# Patient Record
Sex: MALE | Race: WHITE | NOT HISPANIC OR LATINO | Employment: OTHER | URBAN - METROPOLITAN AREA
[De-identification: names, ages, dates, MRNs, and addresses within clinical notes are randomized per-mention and may not be internally consistent; named-entity substitution may affect disease eponyms.]

---

## 2017-01-04 ENCOUNTER — GENERIC CONVERSION - ENCOUNTER (OUTPATIENT)
Dept: OTHER | Facility: OTHER | Age: 77
End: 2017-01-04

## 2017-01-17 ENCOUNTER — ALLSCRIPTS OFFICE VISIT (OUTPATIENT)
Dept: OTHER | Facility: OTHER | Age: 77
End: 2017-01-17

## 2017-01-17 ENCOUNTER — TRANSCRIBE ORDERS (OUTPATIENT)
Dept: ADMINISTRATIVE | Facility: HOSPITAL | Age: 77
End: 2017-01-17

## 2017-01-17 ENCOUNTER — HOSPITAL ENCOUNTER (OUTPATIENT)
Dept: RADIOLOGY | Facility: HOSPITAL | Age: 77
Discharge: HOME | End: 2017-01-17
Payer: COMMERCIAL

## 2017-01-17 DIAGNOSIS — M79.643 PAIN OF HAND: ICD-10-CM

## 2017-01-17 PROCEDURE — 73130 X-RAY EXAM OF HAND: CPT

## 2017-01-19 ENCOUNTER — ALLSCRIPTS OFFICE VISIT (OUTPATIENT)
Dept: OTHER | Facility: OTHER | Age: 77
End: 2017-01-19

## 2017-01-20 ENCOUNTER — GENERIC CONVERSION - ENCOUNTER (OUTPATIENT)
Dept: OTHER | Facility: OTHER | Age: 77
End: 2017-01-20

## 2017-02-23 ENCOUNTER — ALLSCRIPTS OFFICE VISIT (OUTPATIENT)
Dept: OTHER | Facility: OTHER | Age: 77
End: 2017-02-23

## 2017-03-21 ENCOUNTER — ALLSCRIPTS OFFICE VISIT (OUTPATIENT)
Dept: OTHER | Facility: OTHER | Age: 77
End: 2017-03-21

## 2017-03-22 ENCOUNTER — GENERIC CONVERSION - ENCOUNTER (OUTPATIENT)
Dept: OTHER | Facility: OTHER | Age: 77
End: 2017-03-22

## 2017-03-22 LAB
DEPRECATED RDW RBC AUTO: 13.9 % (ref 12.3–15.4)
HCT VFR BLD AUTO: 40.3 % (ref 37.5–51)
HGB BLD-MCNC: 13.5 G/DL (ref 12.6–17.7)
MCH RBC QN AUTO: 29.5 PG (ref 26.6–33)
MCHC RBC AUTO-ENTMCNC: 33.5 G/DL (ref 31.5–35.7)
MCV RBC AUTO: 88 FL (ref 79–97)
PLATELET # BLD AUTO: 201 X10E3/UL (ref 150–379)
RBC (HISTORICAL): 4.57 X10E6/UL (ref 4.14–5.8)
TSH SERPL DL<=0.05 MIU/L-ACNC: 1.49 UIU/ML (ref 0.45–4.5)
WBC # BLD AUTO: 10.8 X10E3/UL (ref 3.4–10.8)

## 2017-03-23 ENCOUNTER — GENERIC CONVERSION - ENCOUNTER (OUTPATIENT)
Dept: OTHER | Facility: OTHER | Age: 77
End: 2017-03-23

## 2017-03-23 ENCOUNTER — ALLSCRIPTS OFFICE VISIT (OUTPATIENT)
Dept: OTHER | Facility: OTHER | Age: 77
End: 2017-03-23

## 2017-03-23 LAB
A/G RATIO (HISTORICAL): 1.3 (ref 1.2–2.2)
ALBUMIN SERPL BCP-MCNC: 3.9 G/DL (ref 3.5–4.8)
ALP SERPL-CCNC: 70 IU/L (ref 39–117)
ALT SERPL W P-5'-P-CCNC: 20 IU/L (ref 0–44)
AST SERPL W P-5'-P-CCNC: 21 IU/L (ref 0–40)
BILIRUB SERPL-MCNC: 0.4 MG/DL (ref 0–1.2)
BUN SERPL-MCNC: 26 MG/DL (ref 8–27)
BUN/CREA RATIO (HISTORICAL): 18 (ref 10–22)
CALCIUM SERPL-MCNC: 9 MG/DL (ref 8.6–10.2)
CHLORIDE SERPL-SCNC: 98 MMOL/L (ref 96–106)
CHOLEST SERPL-MCNC: 196 MG/DL (ref 100–199)
CHOLEST/HDLC SERPL: 5.6 RATIO UNITS (ref 0–5)
CO2 SERPL-SCNC: 26 MMOL/L (ref 18–29)
CREAT SERPL-MCNC: 1.45 MG/DL (ref 0.76–1.27)
CREATININE, URINE (HISTORICAL): 145.8 MG/DL
EGFR AFRICAN AMERICAN (HISTORICAL): 54 ML/MIN/1.73
EGFR-AMERICAN CALC (HISTORICAL): 47 ML/MIN/1.73
GLUCOSE SERPL-MCNC: 122 MG/DL (ref 65–99)
HBA1C MFR BLD HPLC: 5.7 % (ref 4.8–5.6)
HDLC SERPL-MCNC: 35 MG/DL
LDLC SERPL CALC-MCNC: 96 MG/DL (ref 0–99)
MICROALBUM.,U,RANDOM (HISTORICAL): 29.8 UG/ML
MICROALBUMIN/CREATININE RATIO (HISTORICAL): 20.4 MG/G CREAT (ref 0–30)
POTASSIUM SERPL-SCNC: 4.1 MMOL/L (ref 3.5–5.2)
SODIUM SERPL-SCNC: 140 MMOL/L (ref 134–144)
TOT. GLOBULIN, SERUM (HISTORICAL): 3.1 G/DL (ref 1.5–4.5)
TOTAL PROTEIN (HISTORICAL): 7 G/DL (ref 6–8.5)
TRIGL SERPL-MCNC: 325 MG/DL (ref 0–149)
VLDLC SERPL CALC-MCNC: 65 MG/DL (ref 5–40)

## 2017-03-24 ENCOUNTER — GENERIC CONVERSION - ENCOUNTER (OUTPATIENT)
Dept: OTHER | Facility: OTHER | Age: 77
End: 2017-03-24

## 2017-04-05 ENCOUNTER — GENERIC CONVERSION - ENCOUNTER (OUTPATIENT)
Dept: OTHER | Facility: OTHER | Age: 77
End: 2017-04-05

## 2017-05-19 ENCOUNTER — ALLSCRIPTS OFFICE VISIT (OUTPATIENT)
Dept: OTHER | Facility: OTHER | Age: 77
End: 2017-05-19

## 2017-06-06 ENCOUNTER — ALLSCRIPTS OFFICE VISIT (OUTPATIENT)
Dept: OTHER | Facility: OTHER | Age: 77
End: 2017-06-06

## 2017-06-20 ENCOUNTER — ALLSCRIPTS OFFICE VISIT (OUTPATIENT)
Dept: OTHER | Facility: OTHER | Age: 77
End: 2017-06-20

## 2017-06-23 ENCOUNTER — ALLSCRIPTS OFFICE VISIT (OUTPATIENT)
Dept: OTHER | Facility: OTHER | Age: 77
End: 2017-06-23

## 2017-07-14 LAB
BUN SERPL-MCNC: 15 MG/DL (ref 8–27)
BUN/CREA RATIO (HISTORICAL): 12 (ref 10–24)
CALCIUM SERPL-MCNC: 9.3 MG/DL (ref 8.6–10.2)
CHLORIDE SERPL-SCNC: 94 MMOL/L (ref 96–106)
CO2 SERPL-SCNC: 24 MMOL/L (ref 18–29)
CREAT SERPL-MCNC: 1.26 MG/DL (ref 0.76–1.27)
EGFR AFRICAN AMERICAN (HISTORICAL): 64 ML/MIN/1.73
EGFR-AMERICAN CALC (HISTORICAL): 55 ML/MIN/1.73
GLUCOSE SERPL-MCNC: 103 MG/DL (ref 65–99)
POTASSIUM SERPL-SCNC: 4.2 MMOL/L (ref 3.5–5.2)
SODIUM SERPL-SCNC: 135 MMOL/L (ref 134–144)

## 2017-07-17 ENCOUNTER — LAB CONVERSION - ENCOUNTER (OUTPATIENT)
Dept: OTHER | Facility: OTHER | Age: 77
End: 2017-07-17

## 2017-07-27 ENCOUNTER — ALLSCRIPTS OFFICE VISIT (OUTPATIENT)
Dept: OTHER | Facility: OTHER | Age: 77
End: 2017-07-27

## 2017-07-27 ENCOUNTER — TRANSCRIBE ORDERS (OUTPATIENT)
Dept: ADMINISTRATIVE | Facility: HOSPITAL | Age: 77
End: 2017-07-27

## 2017-07-27 DIAGNOSIS — I50.42 CHRONIC COMBINED SYSTOLIC AND DIASTOLIC HEART FAILURE (HCC): ICD-10-CM

## 2017-07-27 DIAGNOSIS — I50.42 CHRONIC COMBINED SYSTOLIC AND DIASTOLIC HEART FAILURE (HCC): Primary | ICD-10-CM

## 2017-08-02 ENCOUNTER — GENERIC CONVERSION - ENCOUNTER (OUTPATIENT)
Dept: OTHER | Facility: OTHER | Age: 77
End: 2017-08-02

## 2017-08-30 ENCOUNTER — HOSPITAL ENCOUNTER (INPATIENT)
Facility: HOSPITAL | Age: 77
LOS: 2 days | Discharge: HOME | DRG: 309 | End: 2017-09-01
Attending: EMERGENCY MEDICINE | Admitting: ANESTHESIOLOGY
Payer: COMMERCIAL

## 2017-08-30 ENCOUNTER — GENERIC CONVERSION - ENCOUNTER (OUTPATIENT)
Dept: OTHER | Facility: OTHER | Age: 77
End: 2017-08-30

## 2017-08-30 DIAGNOSIS — I48.92 ATRIAL FLUTTER, UNSPECIFIED TYPE (HCC): ICD-10-CM

## 2017-08-30 DIAGNOSIS — R26.2 AMBULATORY DYSFUNCTION: ICD-10-CM

## 2017-08-30 DIAGNOSIS — I47.2 V-TACH (HCC): Primary | ICD-10-CM

## 2017-08-30 PROBLEM — N17.9 AKI (ACUTE KIDNEY INJURY) (HCC): Status: ACTIVE | Noted: 2017-08-30

## 2017-08-30 PROBLEM — E03.9 HYPOTHYROIDISM: Status: ACTIVE | Noted: 2017-08-30

## 2017-08-30 PROBLEM — R13.10 DIFFICULTY SWALLOWING: Status: ACTIVE | Noted: 2017-08-30

## 2017-08-30 LAB
ALBUMIN SERPL BCP-MCNC: 3.6 G/DL (ref 3.5–5)
ALP SERPL-CCNC: 62 U/L (ref 46–116)
ALT SERPL W P-5'-P-CCNC: 28 U/L (ref 12–78)
ANION GAP SERPL CALCULATED.3IONS-SCNC: 9 MMOL/L (ref 4–13)
APTT PPP: 28 SECONDS (ref 23–35)
AST SERPL W P-5'-P-CCNC: 28 U/L (ref 5–45)
BASOPHILS # BLD AUTO: 0.1 THOUSANDS/ΜL (ref 0–0.1)
BASOPHILS NFR BLD AUTO: 1 % (ref 0–1)
BILIRUB SERPL-MCNC: 0.5 MG/DL (ref 0.2–1)
BUN SERPL-MCNC: 35 MG/DL (ref 5–25)
CALCIUM SERPL-MCNC: 9.4 MG/DL (ref 8.3–10.1)
CHLORIDE SERPL-SCNC: 99 MMOL/L (ref 100–108)
CHOLEST SERPL-MCNC: 203 MG/DL (ref 50–200)
CO2 SERPL-SCNC: 31 MMOL/L (ref 21–32)
CREAT SERPL-MCNC: 2.07 MG/DL (ref 0.6–1.3)
EOSINOPHIL # BLD AUTO: 0.3 THOUSAND/ΜL (ref 0–0.61)
EOSINOPHIL NFR BLD AUTO: 3 % (ref 0–6)
ERYTHROCYTE [DISTWIDTH] IN BLOOD BY AUTOMATED COUNT: 14.7 % (ref 11.6–15.1)
GFR SERPL CREATININE-BSD FRML MDRD: 30 ML/MIN/1.73SQ M
GLUCOSE SERPL-MCNC: 136 MG/DL (ref 65–140)
HCT VFR BLD AUTO: 46.2 % (ref 42–52)
HDLC SERPL-MCNC: 38 MG/DL (ref 40–60)
HGB BLD-MCNC: 15.3 G/DL (ref 14–18)
INR PPP: 1.02 (ref 0.86–1.16)
LDLC SERPL CALC-MCNC: 114 MG/DL (ref 0–100)
LYMPHOCYTES # BLD AUTO: 2.3 THOUSANDS/ΜL (ref 0.6–4.47)
LYMPHOCYTES NFR BLD AUTO: 23 % (ref 14–44)
MAGNESIUM SERPL-MCNC: 2 MG/DL (ref 1.6–2.6)
MCH RBC QN AUTO: 29.6 PG (ref 27–31)
MCHC RBC AUTO-ENTMCNC: 33.1 G/DL (ref 31.4–37.4)
MCV RBC AUTO: 90 FL (ref 82–98)
MONOCYTES # BLD AUTO: 0.6 THOUSAND/ΜL (ref 0.17–1.22)
MONOCYTES NFR BLD AUTO: 6 % (ref 4–12)
NEUTROPHILS # BLD AUTO: 6.8 THOUSANDS/ΜL (ref 1.85–7.62)
NEUTS SEG NFR BLD AUTO: 68 % (ref 43–75)
NRBC BLD AUTO-RTO: 0 /100 WBCS
NT-PROBNP SERPL-MCNC: 4802 PG/ML
PLATELET # BLD AUTO: 291 THOUSANDS/UL (ref 130–400)
PMV BLD AUTO: 8.3 FL (ref 8.9–12.7)
POTASSIUM SERPL-SCNC: 3.9 MMOL/L (ref 3.5–5.3)
PROT SERPL-MCNC: 8.4 G/DL (ref 6.4–8.2)
PROTHROMBIN TIME: 10.7 SECONDS (ref 9.4–11.7)
RBC # BLD AUTO: 5.16 MILLION/UL (ref 4.7–6.1)
SODIUM SERPL-SCNC: 139 MMOL/L (ref 136–145)
TRIGL SERPL-MCNC: 256 MG/DL
TROPONIN I SERPL-MCNC: 0.04 NG/ML
TSH SERPL DL<=0.05 MIU/L-ACNC: 1.69 UIU/ML (ref 0.36–3.74)
WBC # BLD AUTO: 10.1 THOUSAND/UL (ref 4.8–10.8)

## 2017-08-30 PROCEDURE — 36415 COLL VENOUS BLD VENIPUNCTURE: CPT | Performed by: EMERGENCY MEDICINE

## 2017-08-30 PROCEDURE — 85610 PROTHROMBIN TIME: CPT | Performed by: EMERGENCY MEDICINE

## 2017-08-30 PROCEDURE — 99285 EMERGENCY DEPT VISIT HI MDM: CPT

## 2017-08-30 PROCEDURE — 85730 THROMBOPLASTIN TIME PARTIAL: CPT | Performed by: EMERGENCY MEDICINE

## 2017-08-30 PROCEDURE — 83735 ASSAY OF MAGNESIUM: CPT | Performed by: PHYSICIAN ASSISTANT

## 2017-08-30 PROCEDURE — 87081 CULTURE SCREEN ONLY: CPT | Performed by: ANESTHESIOLOGY

## 2017-08-30 PROCEDURE — 4B02XTZ MEASUREMENT OF CARDIAC DEFIBRILLATOR, EXTERNAL APPROACH: ICD-10-PCS | Performed by: FAMILY MEDICINE

## 2017-08-30 PROCEDURE — 80061 LIPID PANEL: CPT | Performed by: FAMILY MEDICINE

## 2017-08-30 PROCEDURE — 96376 TX/PRO/DX INJ SAME DRUG ADON: CPT

## 2017-08-30 PROCEDURE — 93005 ELECTROCARDIOGRAM TRACING: CPT | Performed by: EMERGENCY MEDICINE

## 2017-08-30 PROCEDURE — 83880 ASSAY OF NATRIURETIC PEPTIDE: CPT | Performed by: EMERGENCY MEDICINE

## 2017-08-30 PROCEDURE — 84443 ASSAY THYROID STIM HORMONE: CPT | Performed by: EMERGENCY MEDICINE

## 2017-08-30 PROCEDURE — 85025 COMPLETE CBC W/AUTO DIFF WBC: CPT | Performed by: EMERGENCY MEDICINE

## 2017-08-30 PROCEDURE — 96374 THER/PROPH/DIAG INJ IV PUSH: CPT

## 2017-08-30 PROCEDURE — 80053 COMPREHEN METABOLIC PANEL: CPT | Performed by: EMERGENCY MEDICINE

## 2017-08-30 PROCEDURE — 84484 ASSAY OF TROPONIN QUANT: CPT | Performed by: EMERGENCY MEDICINE

## 2017-08-30 PROCEDURE — 96361 HYDRATE IV INFUSION ADD-ON: CPT

## 2017-08-30 RX ORDER — LISINOPRIL 5 MG/1
5 TABLET ORAL
COMMUNITY
Start: 2016-08-04

## 2017-08-30 RX ORDER — PANTOPRAZOLE SODIUM 40 MG/1
40 TABLET, DELAYED RELEASE ORAL
Status: DISCONTINUED | OUTPATIENT
Start: 2017-08-31 | End: 2017-09-01 | Stop reason: HOSPADM

## 2017-08-30 RX ORDER — HEPARIN SODIUM 1000 [USP'U]/ML
2000 INJECTION, SOLUTION INTRAVENOUS; SUBCUTANEOUS AS NEEDED
Status: DISCONTINUED | OUTPATIENT
Start: 2017-08-30 | End: 2017-08-31

## 2017-08-30 RX ORDER — DILTIAZEM HYDROCHLORIDE 5 MG/ML
15 INJECTION INTRAVENOUS ONCE
Status: COMPLETED | OUTPATIENT
Start: 2017-08-30 | End: 2017-08-30

## 2017-08-30 RX ORDER — LEVOTHYROXINE SODIUM 0.03 MG/1
25 TABLET ORAL
Status: DISCONTINUED | OUTPATIENT
Start: 2017-08-31 | End: 2017-09-01 | Stop reason: HOSPADM

## 2017-08-30 RX ORDER — TRAZODONE HYDROCHLORIDE 150 MG/1
150 TABLET ORAL
COMMUNITY
Start: 2017-06-06

## 2017-08-30 RX ORDER — OMEPRAZOLE 40 MG/1
CAPSULE, DELAYED RELEASE ORAL DAILY
COMMUNITY
Start: 2016-03-24

## 2017-08-30 RX ORDER — METOPROLOL SUCCINATE 50 MG/1
50 TABLET, EXTENDED RELEASE ORAL DAILY
COMMUNITY
Start: 2017-08-23

## 2017-08-30 RX ORDER — METOPROLOL TARTRATE 5 MG/5ML
5 INJECTION INTRAVENOUS EVERY 6 HOURS PRN
Status: DISCONTINUED | OUTPATIENT
Start: 2017-08-30 | End: 2017-09-01 | Stop reason: HOSPADM

## 2017-08-30 RX ORDER — HEPARIN SODIUM 1000 [USP'U]/ML
4000 INJECTION, SOLUTION INTRAVENOUS; SUBCUTANEOUS ONCE
Status: COMPLETED | OUTPATIENT
Start: 2017-08-30 | End: 2017-08-30

## 2017-08-30 RX ORDER — LEVOTHYROXINE SODIUM 0.03 MG/1
25 TABLET ORAL DAILY
COMMUNITY
Start: 2016-09-02

## 2017-08-30 RX ORDER — HEPARIN SODIUM 10000 [USP'U]/100ML
3-20 INJECTION, SOLUTION INTRAVENOUS
Status: DISCONTINUED | OUTPATIENT
Start: 2017-08-30 | End: 2017-08-31

## 2017-08-30 RX ORDER — TRAZODONE HYDROCHLORIDE 50 MG/1
150 TABLET ORAL
Status: DISCONTINUED | OUTPATIENT
Start: 2017-08-30 | End: 2017-09-01 | Stop reason: HOSPADM

## 2017-08-30 RX ORDER — HEPARIN SODIUM 5000 [USP'U]/ML
5000 INJECTION, SOLUTION INTRAVENOUS; SUBCUTANEOUS EVERY 8 HOURS SCHEDULED
Status: DISCONTINUED | OUTPATIENT
Start: 2017-08-30 | End: 2017-08-30

## 2017-08-30 RX ORDER — MAGNESIUM SULFATE HEPTAHYDRATE 40 MG/ML
2 INJECTION, SOLUTION INTRAVENOUS ONCE
Status: COMPLETED | OUTPATIENT
Start: 2017-08-30 | End: 2017-08-30

## 2017-08-30 RX ORDER — PRAVASTATIN SODIUM 80 MG/1
80 TABLET ORAL
Status: DISCONTINUED | OUTPATIENT
Start: 2017-08-31 | End: 2017-09-01 | Stop reason: HOSPADM

## 2017-08-30 RX ORDER — HEPARIN SODIUM 1000 [USP'U]/ML
4000 INJECTION, SOLUTION INTRAVENOUS; SUBCUTANEOUS AS NEEDED
Status: DISCONTINUED | OUTPATIENT
Start: 2017-08-30 | End: 2017-08-31

## 2017-08-30 RX ORDER — ASPIRIN 81 MG/1
81 TABLET, CHEWABLE ORAL DAILY
Status: DISCONTINUED | OUTPATIENT
Start: 2017-08-31 | End: 2017-09-01 | Stop reason: HOSPADM

## 2017-08-30 RX ORDER — METOPROLOL TARTRATE 5 MG/5ML
5 INJECTION INTRAVENOUS ONCE
Status: COMPLETED | OUTPATIENT
Start: 2017-08-30 | End: 2017-08-30

## 2017-08-30 RX ORDER — FUROSEMIDE 40 MG/1
TABLET ORAL DAILY
COMMUNITY
Start: 2017-07-19

## 2017-08-30 RX ORDER — SODIUM CHLORIDE 9 MG/ML
75 INJECTION, SOLUTION INTRAVENOUS CONTINUOUS
Status: DISCONTINUED | OUTPATIENT
Start: 2017-08-30 | End: 2017-08-31

## 2017-08-30 RX ORDER — PRAVASTATIN SODIUM 80 MG/1
TABLET ORAL DAILY
COMMUNITY
Start: 2017-03-28

## 2017-08-30 RX ADMIN — METOPROLOL TARTRATE 5 MG: 5 INJECTION INTRAVENOUS at 20:36

## 2017-08-30 RX ADMIN — HEPARIN SODIUM 4000 UNITS: 1000 INJECTION, SOLUTION INTRAVENOUS; SUBCUTANEOUS at 18:54

## 2017-08-30 RX ADMIN — DILTIAZEM HYDROCHLORIDE 5 MG/HR: 5 INJECTION INTRAVENOUS at 16:52

## 2017-08-30 RX ADMIN — AMIODARONE HYDROCHLORIDE 150 MG: 50 INJECTION, SOLUTION INTRAVENOUS at 16:09

## 2017-08-30 RX ADMIN — MAGNESIUM SULFATE HEPTAHYDRATE 2 G: 40 INJECTION, SOLUTION INTRAVENOUS at 20:55

## 2017-08-30 RX ADMIN — SODIUM CHLORIDE 125 ML/HR: 0.9 INJECTION, SOLUTION INTRAVENOUS at 17:31

## 2017-08-30 RX ADMIN — HEPARIN SODIUM AND DEXTROSE 3 UNITS/KG/HR: 10000; 5 INJECTION INTRAVENOUS at 18:59

## 2017-08-30 RX ADMIN — DILTIAZEM HYDROCHLORIDE 15 MG: 5 INJECTION INTRAVENOUS at 16:46

## 2017-08-30 RX ADMIN — SODIUM CHLORIDE 1000 ML: 0.9 INJECTION, SOLUTION INTRAVENOUS at 16:15

## 2017-08-30 RX ADMIN — METOPROLOL TARTRATE 5 MG: 5 INJECTION INTRAVENOUS at 22:23

## 2017-08-30 RX ADMIN — TRAZODONE HYDROCHLORIDE 150 MG: 50 TABLET ORAL at 22:23

## 2017-08-31 ENCOUNTER — ANESTHESIA EVENT (OUTPATIENT)
Dept: NON INVASIVE DIAGNOSTICS | Facility: HOSPITAL | Age: 77
End: 2017-08-31

## 2017-08-31 ENCOUNTER — APPOINTMENT (INPATIENT)
Dept: NON INVASIVE DIAGNOSTICS | Facility: HOSPITAL | Age: 77
DRG: 309 | End: 2017-08-31
Attending: INTERNAL MEDICINE
Payer: COMMERCIAL

## 2017-08-31 ENCOUNTER — GENERIC CONVERSION - ENCOUNTER (OUTPATIENT)
Dept: OTHER | Facility: OTHER | Age: 77
End: 2017-08-31

## 2017-08-31 ENCOUNTER — ANESTHESIA (OUTPATIENT)
Dept: NON INVASIVE DIAGNOSTICS | Facility: HOSPITAL | Age: 77
End: 2017-08-31

## 2017-08-31 ENCOUNTER — APPOINTMENT (INPATIENT)
Dept: NON INVASIVE DIAGNOSTICS | Facility: HOSPITAL | Age: 77
DRG: 309 | End: 2017-08-31
Payer: COMMERCIAL

## 2017-08-31 PROBLEM — E03.9 HYPOTHYROIDISM: Chronic | Status: ACTIVE | Noted: 2017-08-30

## 2017-08-31 PROBLEM — F41.9 ANXIETY DISORDER: Status: ACTIVE | Noted: 2017-08-31

## 2017-08-31 PROBLEM — N18.9 CHRONIC KIDNEY DISEASE: Status: ACTIVE | Noted: 2017-08-31

## 2017-08-31 PROBLEM — I25.10 CHRONIC CORONARY ARTERY DISEASE: Status: ACTIVE | Noted: 2017-08-31

## 2017-08-31 PROBLEM — I25.10 CHRONIC CORONARY ARTERY DISEASE: Chronic | Status: ACTIVE | Noted: 2017-08-31

## 2017-08-31 PROBLEM — I42.9 CARDIOMYOPATHY (HCC): Status: ACTIVE | Noted: 2017-08-31

## 2017-08-31 PROBLEM — I50.42 CHRONIC COMBINED SYSTOLIC AND DIASTOLIC HEART FAILURE (HCC): Chronic | Status: ACTIVE | Noted: 2017-08-31

## 2017-08-31 PROBLEM — F41.8 MIXED ANXIETY DEPRESSIVE DISORDER: Chronic | Status: ACTIVE | Noted: 2017-08-31

## 2017-08-31 PROBLEM — E78.2 MULTIPLE-TYPE HYPERLIPIDEMIA: Chronic | Status: ACTIVE | Noted: 2017-08-31

## 2017-08-31 PROBLEM — F41.9 ANXIETY DISORDER: Chronic | Status: ACTIVE | Noted: 2017-08-31

## 2017-08-31 PROBLEM — Z95.0 PRESENCE OF CARDIAC PACEMAKER: Chronic | Status: ACTIVE | Noted: 2017-08-31

## 2017-08-31 PROBLEM — Z95.0 PRESENCE OF CARDIAC PACEMAKER: Status: ACTIVE | Noted: 2017-08-31

## 2017-08-31 PROBLEM — I50.42 CHRONIC COMBINED SYSTOLIC AND DIASTOLIC HEART FAILURE (HCC): Status: ACTIVE | Noted: 2017-08-31

## 2017-08-31 PROBLEM — F41.8 MIXED ANXIETY DEPRESSIVE DISORDER: Status: ACTIVE | Noted: 2017-08-31

## 2017-08-31 PROBLEM — K21.9 GASTROESOPHAGEAL REFLUX DISEASE: Status: ACTIVE | Noted: 2017-08-31

## 2017-08-31 PROBLEM — E78.2 MULTIPLE-TYPE HYPERLIPIDEMIA: Status: ACTIVE | Noted: 2017-08-31

## 2017-08-31 PROBLEM — I42.9 CARDIOMYOPATHY (HCC): Chronic | Status: ACTIVE | Noted: 2017-08-31

## 2017-08-31 PROBLEM — K21.9 GASTROESOPHAGEAL REFLUX DISEASE: Chronic | Status: ACTIVE | Noted: 2017-08-31

## 2017-08-31 PROBLEM — N18.9 CHRONIC KIDNEY DISEASE: Chronic | Status: ACTIVE | Noted: 2017-08-31

## 2017-08-31 LAB
ALBUMIN SERPL BCP-MCNC: 2.8 G/DL (ref 3.5–5)
ALP SERPL-CCNC: 50 U/L (ref 46–116)
ALT SERPL W P-5'-P-CCNC: 24 U/L (ref 12–78)
ANION GAP SERPL CALCULATED.3IONS-SCNC: 6 MMOL/L (ref 4–13)
APTT PPP: 32 SECONDS (ref 23–35)
APTT PPP: 46 SECONDS (ref 24–33)
AST SERPL W P-5'-P-CCNC: 27 U/L (ref 5–45)
ATRIAL RATE: 170 BPM
BASOPHILS # BLD AUTO: 0 THOUSANDS/ΜL (ref 0–0.1)
BASOPHILS NFR BLD AUTO: 0 % (ref 0–1)
BILIRUB SERPL-MCNC: 0.5 MG/DL (ref 0.2–1)
BILIRUB UR QL STRIP: NEGATIVE
BUN SERPL-MCNC: 31 MG/DL (ref 5–25)
CALCIUM SERPL-MCNC: 8.3 MG/DL (ref 8.3–10.1)
CHLORIDE SERPL-SCNC: 104 MMOL/L (ref 100–108)
CLARITY UR: CLEAR
CO2 SERPL-SCNC: 30 MMOL/L (ref 21–32)
COLOR UR: YELLOW
CREAT SERPL-MCNC: 1.82 MG/DL (ref 0.6–1.3)
EOSINOPHIL # BLD AUTO: 0.3 THOUSAND/ΜL (ref 0–0.61)
EOSINOPHIL NFR BLD AUTO: 4 % (ref 0–6)
ERYTHROCYTE [DISTWIDTH] IN BLOOD BY AUTOMATED COUNT: 14.6 % (ref 11.6–15.1)
GFR SERPL CREATININE-BSD FRML MDRD: 36 ML/MIN/1.73SQ M
GLUCOSE SERPL-MCNC: 120 MG/DL (ref 65–140)
GLUCOSE UR STRIP-MCNC: NEGATIVE MG/DL
HCT VFR BLD AUTO: 39.7 % (ref 42–52)
HGB BLD-MCNC: 13.2 G/DL (ref 14–18)
HGB UR QL STRIP.AUTO: NEGATIVE
KETONES UR STRIP-MCNC: NEGATIVE MG/DL
LEUKOCYTE ESTERASE UR QL STRIP: NEGATIVE
LYMPHOCYTES # BLD AUTO: 1.9 THOUSANDS/ΜL (ref 0.6–4.47)
LYMPHOCYTES NFR BLD AUTO: 24 % (ref 14–44)
MAGNESIUM SERPL-MCNC: 2.4 MG/DL (ref 1.6–2.6)
MCH RBC QN AUTO: 30 PG (ref 27–31)
MCHC RBC AUTO-ENTMCNC: 33.2 G/DL (ref 31.4–37.4)
MCV RBC AUTO: 90 FL (ref 82–98)
MONOCYTES # BLD AUTO: 0.5 THOUSAND/ΜL (ref 0.17–1.22)
MONOCYTES NFR BLD AUTO: 6 % (ref 4–12)
NEUTROPHILS # BLD AUTO: 5.1 THOUSANDS/ΜL (ref 1.85–7.62)
NEUTS SEG NFR BLD AUTO: 65 % (ref 43–75)
NITRITE UR QL STRIP: NEGATIVE
NRBC BLD AUTO-RTO: 0 /100 WBCS
PH UR STRIP.AUTO: 5.5 [PH] (ref 5–9)
PHOSPHATE SERPL-MCNC: 3.5 MG/DL (ref 2.3–4.1)
PLATELET # BLD AUTO: 204 THOUSANDS/UL (ref 130–400)
PMV BLD AUTO: 7.7 FL (ref 8.9–12.7)
POTASSIUM SERPL-SCNC: 3.8 MMOL/L (ref 3.5–5.3)
PROT SERPL-MCNC: 6.6 G/DL (ref 6.4–8.2)
PROT UR STRIP-MCNC: NEGATIVE MG/DL
QRS AXIS: -62 DEGREES
QRSD INTERVAL: 150 MS
QT INTERVAL: 332 MS
QTC INTERVAL: 550 MS
RBC # BLD AUTO: 4.41 MILLION/UL (ref 4.7–6.1)
SODIUM SERPL-SCNC: 140 MMOL/L (ref 136–145)
SP GR UR STRIP.AUTO: 1.02 (ref 1–1.03)
T WAVE AXIS: 108 DEGREES
TSH SERPL DL<=0.05 MIU/L-ACNC: 1.12 UIU/ML (ref 0.36–3.74)
UROBILINOGEN UR QL STRIP.AUTO: 0.2 E.U./DL
VENTRICULAR RATE: 165 BPM
WBC # BLD AUTO: 7.8 THOUSAND/UL (ref 4.8–10.8)

## 2017-08-31 PROCEDURE — 81003 URINALYSIS AUTO W/O SCOPE: CPT | Performed by: EMERGENCY MEDICINE

## 2017-08-31 PROCEDURE — 84100 ASSAY OF PHOSPHORUS: CPT | Performed by: PHYSICIAN ASSISTANT

## 2017-08-31 PROCEDURE — 93306 TTE W/DOPPLER COMPLETE: CPT

## 2017-08-31 PROCEDURE — 94760 N-INVAS EAR/PLS OXIMETRY 1: CPT

## 2017-08-31 PROCEDURE — 85730 THROMBOPLASTIN TIME PARTIAL: CPT | Performed by: ANESTHESIOLOGY

## 2017-08-31 PROCEDURE — 83735 ASSAY OF MAGNESIUM: CPT | Performed by: PHYSICIAN ASSISTANT

## 2017-08-31 PROCEDURE — 80053 COMPREHEN METABOLIC PANEL: CPT | Performed by: PHYSICIAN ASSISTANT

## 2017-08-31 PROCEDURE — 84443 ASSAY THYROID STIM HORMONE: CPT | Performed by: INTERNAL MEDICINE

## 2017-08-31 PROCEDURE — 85025 COMPLETE CBC W/AUTO DIFF WBC: CPT | Performed by: PHYSICIAN ASSISTANT

## 2017-08-31 RX ORDER — HEPARIN SODIUM 10000 [USP'U]/100ML
3-20 INJECTION, SOLUTION INTRAVENOUS
Status: DISCONTINUED | OUTPATIENT
Start: 2017-08-31 | End: 2017-08-31

## 2017-08-31 RX ORDER — HEPARIN SODIUM 1000 [USP'U]/ML
2000 INJECTION, SOLUTION INTRAVENOUS; SUBCUTANEOUS AS NEEDED
Status: DISCONTINUED | OUTPATIENT
Start: 2017-08-31 | End: 2017-08-31

## 2017-08-31 RX ORDER — HEPARIN SODIUM 1000 [USP'U]/ML
4000 INJECTION, SOLUTION INTRAVENOUS; SUBCUTANEOUS AS NEEDED
Status: DISCONTINUED | OUTPATIENT
Start: 2017-08-31 | End: 2017-08-31

## 2017-08-31 RX ORDER — POTASSIUM CHLORIDE 20 MEQ/1
20 TABLET, EXTENDED RELEASE ORAL ONCE
Status: COMPLETED | OUTPATIENT
Start: 2017-08-31 | End: 2017-08-31

## 2017-08-31 RX ADMIN — SERTRALINE HYDROCHLORIDE 50 MG: 50 TABLET ORAL at 09:53

## 2017-08-31 RX ADMIN — POTASSIUM CHLORIDE 20 MEQ: 1500 TABLET, EXTENDED RELEASE ORAL at 08:08

## 2017-08-31 RX ADMIN — PRAVASTATIN SODIUM 80 MG: 80 TABLET ORAL at 16:22

## 2017-08-31 RX ADMIN — METOPROLOL TARTRATE 25 MG: 25 TABLET ORAL at 12:46

## 2017-08-31 RX ADMIN — HEPARIN SODIUM 4000 UNITS: 1000 INJECTION, SOLUTION INTRAVENOUS; SUBCUTANEOUS at 01:41

## 2017-08-31 RX ADMIN — HEPARIN SODIUM 2700 UNITS: 1000 INJECTION, SOLUTION INTRAVENOUS; SUBCUTANEOUS at 10:50

## 2017-08-31 RX ADMIN — AMIODARONE HYDROCHLORIDE 1 MG/MIN: 50 INJECTION, SOLUTION INTRAVENOUS at 10:23

## 2017-08-31 RX ADMIN — METOPROLOL TARTRATE 25 MG: 25 TABLET ORAL at 21:36

## 2017-08-31 RX ADMIN — ASPIRIN 81 MG 81 MG: 81 TABLET ORAL at 09:52

## 2017-08-31 RX ADMIN — HEPARIN SODIUM AND DEXTROSE 17.1 UNITS/KG/HR: 10000; 5 INJECTION INTRAVENOUS at 10:55

## 2017-08-31 RX ADMIN — PANTOPRAZOLE SODIUM 40 MG: 40 TABLET, DELAYED RELEASE ORAL at 05:14

## 2017-08-31 RX ADMIN — TRAZODONE HYDROCHLORIDE 150 MG: 50 TABLET ORAL at 21:37

## 2017-08-31 RX ADMIN — SODIUM CHLORIDE 75 ML/HR: 0.9 INJECTION, SOLUTION INTRAVENOUS at 05:01

## 2017-08-31 RX ADMIN — AMIODARONE HYDROCHLORIDE 0.5 MG/MIN: 50 INJECTION, SOLUTION INTRAVENOUS at 16:23

## 2017-08-31 RX ADMIN — LEVOTHYROXINE SODIUM 25 MCG: 25 TABLET ORAL at 05:14

## 2017-08-31 RX ADMIN — RIVAROXABAN 15 MG: 15 TABLET, FILM COATED ORAL at 16:22

## 2017-09-01 ENCOUNTER — APPOINTMENT (INPATIENT)
Dept: PHYSICAL THERAPY | Facility: HOSPITAL | Age: 77
DRG: 309 | End: 2017-09-01
Payer: COMMERCIAL

## 2017-09-01 VITALS
DIASTOLIC BLOOD PRESSURE: 57 MMHG | RESPIRATION RATE: 19 BRPM | HEART RATE: 69 BPM | TEMPERATURE: 98.7 F | OXYGEN SATURATION: 94 % | BODY MASS INDEX: 43.39 KG/M2 | WEIGHT: 276.46 LBS | HEIGHT: 67 IN | SYSTOLIC BLOOD PRESSURE: 112 MMHG

## 2017-09-01 LAB
ANION GAP SERPL CALCULATED.3IONS-SCNC: 6 MMOL/L (ref 4–13)
BUN SERPL-MCNC: 25 MG/DL (ref 5–25)
CALCIUM SERPL-MCNC: 8.3 MG/DL (ref 8.3–10.1)
CHLORIDE SERPL-SCNC: 106 MMOL/L (ref 100–108)
CO2 SERPL-SCNC: 28 MMOL/L (ref 21–32)
CREAT SERPL-MCNC: 1.5 MG/DL (ref 0.6–1.3)
GFR SERPL CREATININE-BSD FRML MDRD: 45 ML/MIN/1.73SQ M
GLUCOSE SERPL-MCNC: 118 MG/DL (ref 65–140)
MAGNESIUM SERPL-MCNC: 2.2 MG/DL (ref 1.6–2.6)
MRSA NOSE QL CULT: NORMAL
PHOSPHATE SERPL-MCNC: 2.8 MG/DL (ref 2.3–4.1)
POTASSIUM SERPL-SCNC: 3.9 MMOL/L (ref 3.5–5.3)
SODIUM SERPL-SCNC: 140 MMOL/L (ref 136–145)

## 2017-09-01 PROCEDURE — G8979 MOBILITY GOAL STATUS: HCPCS

## 2017-09-01 PROCEDURE — 80048 BASIC METABOLIC PNL TOTAL CA: CPT | Performed by: FAMILY MEDICINE

## 2017-09-01 PROCEDURE — 84100 ASSAY OF PHOSPHORUS: CPT | Performed by: FAMILY MEDICINE

## 2017-09-01 PROCEDURE — 83735 ASSAY OF MAGNESIUM: CPT | Performed by: FAMILY MEDICINE

## 2017-09-01 PROCEDURE — G8978 MOBILITY CURRENT STATUS: HCPCS

## 2017-09-01 PROCEDURE — G0009 ADMIN PNEUMOCOCCAL VACCINE: HCPCS | Performed by: ANESTHESIOLOGY

## 2017-09-01 PROCEDURE — 97163 PT EVAL HIGH COMPLEX 45 MIN: CPT

## 2017-09-01 PROCEDURE — 90670 PCV13 VACCINE IM: CPT | Performed by: ANESTHESIOLOGY

## 2017-09-01 RX ORDER — AMIODARONE HYDROCHLORIDE 200 MG/1
200 TABLET ORAL 2 TIMES DAILY
Qty: 60 TABLET | Refills: 0
Start: 2017-09-01

## 2017-09-01 RX ADMIN — METOPROLOL TARTRATE 25 MG: 25 TABLET ORAL at 08:55

## 2017-09-01 RX ADMIN — PANTOPRAZOLE SODIUM 40 MG: 40 TABLET, DELAYED RELEASE ORAL at 05:15

## 2017-09-01 RX ADMIN — PNEUMOCOCCAL 13-VALENT CONJUGATE VACCINE 0.5 ML: 2.2; 2.2; 2.2; 2.2; 2.2; 4.4; 2.2; 2.2; 2.2; 2.2; 2.2; 2.2; 2.2 INJECTION, SUSPENSION INTRAMUSCULAR at 15:38

## 2017-09-01 RX ADMIN — ASPIRIN 81 MG 81 MG: 81 TABLET ORAL at 08:55

## 2017-09-01 RX ADMIN — SERTRALINE HYDROCHLORIDE 50 MG: 50 TABLET ORAL at 08:55

## 2017-09-01 RX ADMIN — LEVOTHYROXINE SODIUM 25 MCG: 25 TABLET ORAL at 07:20

## 2017-09-07 ENCOUNTER — GENERIC CONVERSION - ENCOUNTER (OUTPATIENT)
Dept: OTHER | Facility: OTHER | Age: 77
End: 2017-09-07

## 2017-09-28 ENCOUNTER — ALLSCRIPTS OFFICE VISIT (OUTPATIENT)
Dept: OTHER | Facility: OTHER | Age: 77
End: 2017-09-28

## 2017-10-04 ENCOUNTER — ALLSCRIPTS OFFICE VISIT (OUTPATIENT)
Dept: OTHER | Facility: OTHER | Age: 77
End: 2017-10-04

## 2017-10-04 DIAGNOSIS — K21.9 GASTRO-ESOPHAGEAL REFLUX DISEASE WITHOUT ESOPHAGITIS: ICD-10-CM

## 2017-10-04 DIAGNOSIS — K22.4 DYSKINESIA OF ESOPHAGUS: ICD-10-CM

## 2017-10-04 DIAGNOSIS — K22.9 DISEASE OF ESOPHAGUS: ICD-10-CM

## 2017-10-05 LAB
BUN SERPL-MCNC: 22 MG/DL (ref 8–27)
BUN/CREA RATIO (HISTORICAL): 12 (ref 10–24)
CALCIUM SERPL-MCNC: 8.8 MG/DL (ref 8.6–10.2)
CHLORIDE SERPL-SCNC: 96 MMOL/L (ref 96–106)
CO2 SERPL-SCNC: 23 MMOL/L (ref 18–29)
CREAT SERPL-MCNC: 1.86 MG/DL (ref 0.76–1.27)
EGFR AFRICAN AMERICAN (HISTORICAL): 40 ML/MIN/1.73
EGFR-AMERICAN CALC (HISTORICAL): 35 ML/MIN/1.73
GLUCOSE SERPL-MCNC: 156 MG/DL (ref 65–99)
POTASSIUM SERPL-SCNC: 3.8 MMOL/L (ref 3.5–5.2)
SODIUM SERPL-SCNC: 138 MMOL/L (ref 134–144)

## 2017-10-11 ENCOUNTER — HOSPITAL ENCOUNTER (OUTPATIENT)
Dept: RADIOLOGY | Facility: HOSPITAL | Age: 77
Discharge: HOME | End: 2017-10-11
Payer: COMMERCIAL

## 2017-10-11 DIAGNOSIS — K21.9 GASTRO-ESOPHAGEAL REFLUX DISEASE WITHOUT ESOPHAGITIS: ICD-10-CM

## 2017-10-11 DIAGNOSIS — K22.4 DYSKINESIA OF ESOPHAGUS: ICD-10-CM

## 2017-10-11 PROCEDURE — 74220 X-RAY XM ESOPHAGUS 1CNTRST: CPT

## 2017-10-13 ENCOUNTER — TRANSCRIBE ORDERS (OUTPATIENT)
Dept: ADMINISTRATIVE | Facility: HOSPITAL | Age: 77
End: 2017-10-13

## 2017-10-13 DIAGNOSIS — K22.89 ESOPHAGEAL MASS: Primary | ICD-10-CM

## 2017-10-16 ENCOUNTER — GENERIC CONVERSION - ENCOUNTER (OUTPATIENT)
Dept: OTHER | Facility: OTHER | Age: 77
End: 2017-10-16

## 2017-10-20 ENCOUNTER — GENERIC CONVERSION - ENCOUNTER (OUTPATIENT)
Dept: OTHER | Facility: OTHER | Age: 77
End: 2017-10-20

## 2017-10-24 ENCOUNTER — ALLSCRIPTS OFFICE VISIT (OUTPATIENT)
Dept: OTHER | Facility: OTHER | Age: 77
End: 2017-10-24

## 2017-10-26 NOTE — CONSULTS
Assessment  1  Difficulty swallowing (787 20) (R13 10)   2  GERD (gastroesophageal reflux disease) (530 81) (K21 9)   3  Abdominal hernia (553 9) (K46 9)    Plan  GERD (gastroesophageal reflux disease)    · EGD; Status:Active; Requested SDJ:[id number];    Perform:[facility]; C:[id number]; Last Updated By:Jasen Farrar; 10/24/2017 3:45:38 PM;Ordered; For:GERD (gastroesophageal reflux disease); Ordered By:Deni Dennis;    Discussion/Summary  Discussion Summary:   Pleasant 71-year-old gentleman with a history of atrial flutter, defer later, on anticoagulation with longstanding history of acid reflux and recent dysphagia over the past 1 month  Dysphagia: Given his clinical history this is concerning for underlying malignancy versus reflux versus soft chills stricture and dysmotilityPPI therapywill plan for urgent upper endoscopy with possible dilation and biopsiesrefer the patient for cardiac clearance to stop his anticoagulation therapy  Abdominal wall hernia: This should be addressed, he will need to have surgical referral once we have determine what his esophageal pathology is  discussed with him the risks of the procedure including bleeding, surgery, perforation, missed polyp detection rate  Chief Complaint  Chief Complaint Free Text Note Form: Difficulty swallowing      History of Present Illness  HPI: As you know this is a 71-year-old gentleman with a history of hypertension, pacemaker/defibrillator, depression seen by Cardiology on anticoagulation reports that he has had difficulty swallowing solid foods for the last 1 month  He reports a longstanding history of acid reflux greater than 25 years he has been on PPI therapy for many of these years  Apparently had an upper endoscopy about 3 years ago as well as a colonoscopy while living in [facility]  Reportedly normal at that time   He notes that he has profound amounts of mucus and phlegm after a tried any solid foods, he is able to try soft solids and liquids at this time  He has been able to maintain his weight  any nausea, vomiting, change in bowel movements, diarrhea, constipation, melena, rectal bleeding  reports he has an abdominal distention and fullness which occurs after he eats certain meals  history is notable for pacemaker/defibrillator  He quit smoking many years ago, quit alcohol  Previously worked in [employer]  any family history of GI or associated malignancies  had a recent barium study which demonstrates irregularity in his GE junction  Review of Systems  Complete-Male GI Adult: Other Symptoms: The remainder of the ten ROS was negative  Active Problems  1  Anxiety disorder (300 00) (F41 9)   2  Atrial flutter (427 32) (I48 92)   3  BMI 45 0-49 9, adult (V85 42) (Z68 42)   4  Bursitis of left shoulder (726 10) (M75 52)   5  Cardiomyopathy (425 4) (I42 9)   6  Chronic combined systolic and diastolic congestive heart failure (428 42,428 0) (I50 42)   7  CKD (chronic kidney disease), stage III (585 3) (N18 3)   8  Coronary artery disease (414 00) (I25 10)   9  Cubital tunnel syndrome on left (354 2) (G56 22)   10  Depression with anxiety (300 4) (F41 8)   11  Diabetes mellitus screening (V77 1) (Z13 1)   12  Diastolic heart failure, NYHA class 1 (428 30) (I50 30)   13  Elevated TSH (794 5) (R94 6)   14  Esophageal dysmotility (530 5) (K22 4)   15  Esophageal mass (530 9) (K22 9)   16  Esophageal reflux (530 81) (K21 9)   17  Gynecomastia, male (611 1) (N62)   25  Hand pain (729 5) (M79 643)   19  Hand tingling (782 0) (R20 2)   20  Hyperkalemia (276 7) (E87 5)   21  Hypertensive Renal Sclerosis (403 90)   22  ICD (implantable cardioverter-defibrillator) in place (V45 02) (Z95 810)   23  Iliotibial band syndrome, left leg (728 89) (M76 32)   24  Insomnia (780 52) (G47 00)   25  Localized, primary osteoarthritis of lower leg, unspecified laterality   26  Mixed hyperlipidemia (272 2) (E78 2)   27   Muscle twitching (781 0) (R25 3) 28  Need for DTaP, hepatitis B, and IPV vaccination (V06 8) (Z23)   29  Need for influenza vaccination (V04 81) (Z23)   30  Need for pneumococcal vaccination (V03 82) (Z23)   31  Need for vaccination with 13-polyvalent pneumococcal conjugate vaccine (V03 82) (Z23)   32  Pacemaker (V45 01) (Z95 0)   33  Peripheral edema (782 3) (R60 9)   34  Prediabetes (790 29) (R73 03)   35  Screening for neurological condition (V80 09) (Z13 89)   36  Subclinical hypothyroidism (244 8) (E03 9)   37  Thyroid disorder screening (V77 0) (Z13 29)    Past Medical History  1  History of Acute thigh pain, right (729 5) (M79 651)   2  History of Anxiety (300 00) (F41 9)   3  History of Cardiomyopathy, ischemic (414 8) (I25 5)   4  History of chronic kidney disease (V13 09) (Z87 448)   5  History of hyperlipidemia (V12 29) (Z86 39)  Active Problems And Past Medical History Reviewed: The active problems and past medical history were reviewed and updated today  Surgical History  1  History of Colonoscopy (Fiberoptic) Screening   2  History of Open Treatment Of Tibial Shaft Fracture With Implant   3  History of Pacemaker - Pulse Generator Replacement   4  History of Pacemaker Placement  Surgical History Reviewed: The surgical history was reviewed and updated today  Family History  Mother    1  Family history of sudden cardiac death (SCD) (V17 41) (Z82 41)  Family History Reviewed: The family history was reviewed and updated today  Social History   · Denied: History of Alcohol use   · Always uses seat belt   ·    · Denied: History of Drug use   · Exercises daily (V49 89) (Z78 9)   · History of    · Never a smoker   · Pets/Animals: Dog   · Sleeps 8 - 10 hours a day  Social History Reviewed: The social history was reviewed and updated today  Current Meds   1  Amiodarone HCl - 200 MG Oral Tablet; TAKE 1 TABLET TWICE DAILY; Therapy: (Recorded:08Sep2017) to Recorded   2   Aspirin Low Dose 81 MG TABS; TAKE 1 TABLET DAILY; Therapy: ([facility]) to Recorded   3  Furosemide 40 MG Oral Tablet; take one tablet by mouth twice daily; Therapy: 71TXO4206 to (Evaluate:02Jan2018)  Requested for: 46ULU2437; Last   Rx:04Oct2017 Ordered   4  Levothyroxine Sodium 25 MCG Oral Tablet; TAKE ONE TABLET BY MOUTH ONCE DAILY; Therapy: 13Jif8415 to (Evaluate:02Jan2018)  Requested for: 61FWK4998; Last   Rx:24Vzu2210 Ordered   5  Lisinopril 5 MG Oral Tablet; TAKE 1 TABLET DAILY; Therapy: 74Zdd3346 to (Evaluate:03Dec2017)  Requested for: 57JKD2123; Last   Rx:04Oct2017 Ordered   6  Metoprolol Succinate ER 50 MG Oral Tablet Extended Release 24 Hour; TAKE THREE   TABLETS BY MOUTH ONCE DAILY; Therapy: 89DBA5659 to (Evaluate:02Apr2018)  Requested for: 49RYR9776; Last   Rx:04Oct2017 Ordered   7  Omega-3-acid Ethyl Esters 1 GM Oral Capsule; TAKE ONE CAPSULE BY MOUTH TWICE   DAILY; Therapy: 25Kpl6925 to ([facility])  Requested for: 06ICQ1946; Last   MJ:02JJK8403 Ordered   8  Omeprazole 40 MG Oral Capsule Delayed Release; TAKE ONE CAPSULE BY MOUTH   ONCE DAILY; Therapy: 02VYU4183 to (Evaluate:01Feb2018)  Requested for: 88RUO9817; Last   Rx:04Oct2017 Ordered   9  Pravastatin Sodium 80 MG Oral Tablet; TAKE ONE TABLET BY MOUTH ONCE DAILY; Therapy: 29NTL6469 to (60 124 37 75)  Requested for: 98SRJ3240; Last   Rx:04Oct2017 Ordered   10  Sertraline HCl - 50 MG Oral Tablet; TAKE ONE TABLET BY MOUTH ONCE DAILY; Therapy: 27EJG5779 to (Evaluate:01Feb2018)  Requested for: 44LJK7845; Last    Rx:04Oct2017 Ordered   11  TraZODone HCl - 150 MG Oral Tablet; TAKE ONE TABLET BY MOUTH AT BEDTIME; Therapy: 36EXT0461 to (Evaluate:02Apr2018)  Requested for: 01ZOQ1590; Last    Rx:04Oct2017 Ordered   12  Xarelto 20 MG Oral Tablet; Take 1 tablet daily with breakfast  Requested for: 62NXI0180;    Last Rx:04Oct2017 Ordered  Medication List Reviewed: The medication list was reviewed and updated today  Allergies  1   No Known Drug Allergies  2  No Known Latex Allergies    Vitals  Vital Signs    Recorded: 40PNA4716 03:06PM   Temperature 98 4 F   Heart Rate 87   Respiration 16   Systolic 641   Diastolic 82   Height 5 ft 6 in   Weight 272 lb    BMI Calculated 43 9   BSA Calculated 2 28   O2 Saturation 97     Physical Exam  Gen:  Obese, wn/wd, NAD  HEENT: anicteric, MMM, no cervical LAD  CVS:  Irregularly irregular, no murmurs appreciated  CHEST: CTA b/l  ABD: +BS, soft, NT,ND, no hepatosplenomegaly, he does have a large supraumbilical hernia which is nontender an easily reducible  EXT:  1+ bilateral lower extremity edema  NEURO: aaox3  SKIN: NO rashes         Future Appointments    Date/Time Provider Specialty Site   10/31/2017 09:30 AM CHUCHO Puentes   Gastroenterology Adult [facility]     Signatures   Electronically signed by : CHUCHO Rhodes ; Oct 25 2017  9:15AM EST                       (Author)

## 2017-10-30 ENCOUNTER — ANESTHESIA EVENT (OUTPATIENT)
Dept: GASTROENTEROLOGY | Facility: AMBULARY SURGERY CENTER | Age: 77
End: 2017-10-30
Payer: COMMERCIAL

## 2017-10-30 NOTE — ANESTHESIA PREPROCEDURE EVALUATION
Atrial flutter with rapid ventricular response (HCC)   Dysphagia   LIVAN (acute kidney injury) ([facility] [address] )   Hypothyroidism   Anxiety disorder   Body mass index (BMI) of 45 0-49 9 in adult ([facility] )   Cardiomyopathy (HCC)   Chronic combined systolic and diastolic heart failure (HCC)   Chronic kidney disease   Chronic coronary artery disease   Mixed anxiety depressive disorder   Gastroesophageal reflux disease   Multiple-type hyperlipidemia   Presence of cardiac pacemaker       Review of Systems/Medical History  Patient summary reviewed  Chart reviewed      Cardiovascular  Pacemaker/AICD, Hyperlipidemia, Hypertension , CAD, ,   Comment: Echo complete with contrast if indicated   Status: Final result  PACS Images     Show images for Echo complete with contrast if indicated  Study Result     [address] [facility] [phone]     Transthoracic Echocardiogram  2D, M-mode, Doppler, and Color Doppler     Study date:  31-Aug-2017     Patient: Diana Crawley  MR number: [id number]  Account number: [de-identified]  : 1940  Age: 77 years  Gender: Male  Status: Routine  Location: Bedside  Height: 67 in  Weight: 272 4 lb  BP: 93/ 52 mmHg     Indications: Tachycardia     Diagnoses: R00 0 - Tachycardia, unspecified     Sonographer:  MIHAI Samaniego  Primary Physician: Yuliya Martin DO  Group:  Lou Gerardo  Interpreting Physician:  Zahira Samaniego DO     SUMMARY     LEFT VENTRICLE:  Systolic function was mildly reduced by visual assessment  Ejection fraction was estimated in the range of 45 % to 50 %  There was mild diffuse hypokinesis with no identifiable regional variations  There was mild concentric hypertrophy  Doppler parameters were consistent with elevated mean left atrial filling pressure      VENTRICULAR SEPTUM:  There was paradoxical motion   These changes are consistent with right ventricular pacing      MITRAL VALVE:  There was mild regurgitation      AORTIC VALVE:  There was no evidence for stenosis  There was no regurgitation      TRICUSPID VALVE:  There was mild regurgitation  Pulmonary artery systolic pressure was within the normal range      HISTORY: PRIOR HISTORY: A  Flutter, Acute kidney injury, Hypothyroidism     PROCEDURE: The procedure was performed at the bedside  This was a routine study  The transthoracic approach was used  The study included complete 2D imaging, M-mode, complete spectral Doppler, and color Doppler  The heart rate was 70 bpm,  at the start of the study  Images were obtained from the parasternal, apical, subcostal, and suprasternal notch acoustic windows  Intravenous contrast (Definity solution (1 3 ml Definity/8 7ml normal saline solution), 2 ml) was  administered to evaluate intracardiac shunting and opacify the left ventricle  This was a technically difficult study      LEFT VENTRICLE: Size was normal  Systolic function was mildly reduced by visual assessment  Ejection fraction was estimated in the range of 45 % to 50 %  There was mild diffuse hypokinesis with no identifiable regional variations  There  was mild concentric hypertrophy  DOPPLER: Doppler parameters were consistent with elevated mean left atrial filling pressure      VENTRICULAR SEPTUM: There was paradoxical motion  These changes are consistent with right ventricular pacing      RIGHT VENTRICLE: The size was normal  Systolic function was normal  A pacing wire was present  The tip was at the RV apex  DOPPLER: Systolic pressure was within the normal range      LEFT ATRIUM: The atrium was mildly dilated      RIGHT ATRIUM: Size was normal      MITRAL VALVE: Valve structure was normal  There was normal leaflet separation  No echocardiographic evidence for prolapse  DOPPLER: The transmitral velocity was within the normal range  There was no evidence for stenosis  There was mild  regurgitation      AORTIC VALVE: The valve was trileaflet   Leaflets exhibited normal cuspal separation and sclerosis  DOPPLER: Transaortic velocity was within the normal range  There was no evidence for stenosis  There was no regurgitation      TRICUSPID VALVE: The valve structure was normal  There was normal leaflet separation  DOPPLER: The transtricuspid velocity was within the normal range  There was mild regurgitation  Pulmonary artery systolic pressure was within the normal  range  Estimated peak PA pressure was 31 mmHg      PULMONIC VALVE: Leaflets exhibited normal thickness, no calcification, and normal cuspal separation  DOPPLER: The transpulmonic velocity was within the normal range  There was mild regurgitation      PERICARDIUM: There was no thickening  There was no pericardial effusion      AORTA: The root exhibited normal size      PULMONARY ARTERY: The size was normal  The morphology appeared normal            cardiomyopathy,  Pulmonary       GI/Hepatic  Dysphagia,   GERD ,        Kidney disease ,        Endo/Other  History of thyroid disease , hypothyroidism,      GYN       Hematology   Musculoskeletal  Obesity  super morbid obesity,        Neurology   Psychology           Physical Exam    Airway    Mallampati score: II  TM Distance: >3 FB  Neck ROM: full     Dental       Cardiovascular  Rhythm: regular, Rate: normal,     Pulmonary  Breath sounds clear to auscultation,     Other Findings        Anesthesia Plan  ASA Score- 4       Anesthesia Type- IV sedation with anesthesia with ASA Monitors  Additional Monitors:   Airway Plan:           Induction- intravenous  Informed Consent- Anesthetic plan and risks discussed with patient

## 2017-10-31 ENCOUNTER — GENERIC CONVERSION - ENCOUNTER (OUTPATIENT)
Dept: OTHER | Facility: OTHER | Age: 77
End: 2017-10-31

## 2017-10-31 ENCOUNTER — ANESTHESIA (OUTPATIENT)
Dept: GASTROENTEROLOGY | Facility: AMBULARY SURGERY CENTER | Age: 77
End: 2017-10-31
Payer: COMMERCIAL

## 2017-10-31 ENCOUNTER — HOSPITAL ENCOUNTER (OUTPATIENT)
Facility: AMBULARY SURGERY CENTER | Age: 77
Setting detail: OUTPATIENT SURGERY
Discharge: HOME | End: 2017-10-31
Attending: INTERNAL MEDICINE | Admitting: INTERNAL MEDICINE
Payer: COMMERCIAL

## 2017-10-31 VITALS
DIASTOLIC BLOOD PRESSURE: 80 MMHG | TEMPERATURE: 99 F | RESPIRATION RATE: 18 BRPM | OXYGEN SATURATION: 93 % | SYSTOLIC BLOOD PRESSURE: 187 MMHG | HEART RATE: 70 BPM

## 2017-10-31 DIAGNOSIS — K21.9 GASTRO-ESOPHAGEAL REFLUX DISEASE WITHOUT ESOPHAGITIS: ICD-10-CM

## 2017-10-31 PROCEDURE — 88313 SPECIAL STAINS GROUP 2: CPT | Performed by: INTERNAL MEDICINE

## 2017-10-31 PROCEDURE — 88305 TISSUE EXAM BY PATHOLOGIST: CPT | Performed by: INTERNAL MEDICINE

## 2017-10-31 RX ORDER — PROPOFOL 10 MG/ML
INJECTION, EMULSION INTRAVENOUS AS NEEDED
Status: DISCONTINUED | OUTPATIENT
Start: 2017-10-31 | End: 2017-10-31 | Stop reason: SURG

## 2017-10-31 RX ORDER — SODIUM CHLORIDE, SODIUM LACTATE, POTASSIUM CHLORIDE, CALCIUM CHLORIDE 600; 310; 30; 20 MG/100ML; MG/100ML; MG/100ML; MG/100ML
125 INJECTION, SOLUTION INTRAVENOUS CONTINUOUS
Status: DISCONTINUED | OUTPATIENT
Start: 2017-10-31 | End: 2017-10-31 | Stop reason: HOSPADM

## 2017-10-31 RX ADMIN — PROPOFOL 100 MG: 10 INJECTION, EMULSION INTRAVENOUS at 08:10

## 2017-10-31 RX ADMIN — SODIUM CHLORIDE, SODIUM LACTATE, POTASSIUM CHLORIDE, AND CALCIUM CHLORIDE 125 ML/HR: .6; .31; .03; .02 INJECTION, SOLUTION INTRAVENOUS at 08:48

## 2017-10-31 NOTE — DISCHARGE INSTRUCTIONS
Discharge home  Full liquid diet  If cleared by Cardiology would recommend holding anticoagulation  Follow up biopsy results call the office in 1 week  Call with any abdominal pain, bleeding, fevers  Check a CT scan of his chest abdomen and pelvis

## 2017-10-31 NOTE — H&P
History and Physical -  [facility]  Arlene Colbert  77 y o  male MRN: [id number]    HPI: Arlene Colbert  is a 77y o  year old male who presents with solid food dysphagia, chronic GERD  Review of Systems    Historical Information   Past Medical History:   Diagnosis Date    Cardiac disease     Disease of thyroid gland     Hyperlipidemia     Hypertension      Past Surgical History:   Procedure Laterality Date    CARDIAC PACEMAKER PLACEMENT      LEG SURGERY       Social History   History   Alcohol Use No     History   Drug Use No     History   Smoking Status    Former Smoker    Types: Cigarettes   Smokeless Tobacco    Never Used     History reviewed  No pertinent family history  Meds/Allergies     Prescriptions Prior to Admission   Medication    amiodarone 200 mg tablet    aspirin (ASPIRIN LOW DOSE) 81 MG tablet    furosemide (LASIX) 40 mg tablet    levothyroxine 25 mcg tablet    lisinopril (ZESTRIL) 5 mg tablet    metoprolol succinate (TOPROL-XL) 50 mg 24 hr tablet    Omega-3-Acid Eth Est, Dietary, 1 g CAPS    omeprazole (PriLOSEC) 40 MG capsule    pravastatin (PRAVACHOL) 80 mg tablet    sertraline (ZOLOFT) 50 mg tablet    traZODone (DESYREL) 150 mg tablet    rivaroxaban (XARELTO) 15 mg tablet    rivaroxaban (XARELTO) 20 mg tablet       No Known Allergies    Objective     There were no vitals taken for this visit  PHYSICAL EXAM    Gen: NAD  CV: RRR  CHEST: Clear  ABD: soft, NT/ND  EXT: no edema  Neuro: AAO      ASSESSMENT/PLAN:  This is a 77y o  year old male here for solid food dysphagia, chronic GERD      PLAN:   Procedure: Leonela Reid

## 2017-10-31 NOTE — OP NOTE
ESOPHAGOGASTRODUODENOSCOPY    PROCEDURE: EGD/ Biopsy    INDICATIONS: Dysphagia    POST-OP DIAGNOSIS: See the impression below    SEDATION: Monitored anesthesia care, check anesthesia records    PHYSICAL EXAM:    Vitals:    10/31/17 0756   BP: 160/72   Pulse: 69   Resp: 18   Temp: 99 °F (37 2 °C)   SpO2: 93%    There is no height or weight on file to calculate BMI  General: NAD  Heart: S1 & S2 normal, RRR  Lungs: CTA, No rales or rhonchi  Abdomen: Soft, nontender, nondistended, good bowel sounds    CONSENT:  Informed consent was obtained for the procedure, including sedation after explaining the risks and benefits of the procedure  Risks including but not limited to bleeding, perforation, infection, aspiration were discussed in detail  Also explained about less than 100% sensitivity with the exam and other alternatives  PREPARATION:   EKG tracing, pulse oximetry, blood pressure were monitored throughout the procedure  Patient was identified by myself both verbally and by visual inspection of ID band  DESCRIPTION:   Patient was placed in the left lateral decubitus position and was sedated with the above medication  The gastroscope was introduced in to the oropharynx and the esophagus was intubated under direct visualization  Scope was passed down the esophagus up to 2nd part of the duodenum  A careful inspection was made as the gastroscope was withdrawn, including a retroflexed view of the stomach; findings and interventions are described below  FINDINGS:    #1  Esophagus and GEJ- circumferential, nearly obstructing mass seen at 33 cm, scope was not able to be passed beyond this  Multiple biopsies were taken there was some residual medications that were lodged in and gently pushed through    #2  Stomach- not evaluated    #3   Duodenum- unable to evaluate         IMPRESSIONS:      Circumferential, nearly obstructing mass like lesion at 33 cm, multiple biopsies taken    RECOMMENDATIONS:     Discharge home  Full liquid diet  If cleared by Cardiology would recommend holding anticoagulation  Follow up biopsy results call the office in 1 week  Call with any abdominal pain, bleeding, fevers  Check a CT scan of his chest abdomen and pelvis    COMPLICATIONS:  None; patient tolerated the procedure well            DISPOSITION: PACU           CONDITION: Stable

## 2017-10-31 NOTE — ANESTHESIA POSTPROCEDURE EVALUATION
Post-Op Assessment Note      CV Status:  Stable    Mental Status:  Awake    Hydration Status:  Stable    PONV Controlled:  None    Airway Patency:  Patent    Post Op Vitals Reviewed: Yes          Staff: Anesthesiologist           BP     Temp      Pulse     Resp      SpO2

## 2017-11-02 ENCOUNTER — HOSPITAL ENCOUNTER (OUTPATIENT)
Dept: RADIOLOGY | Facility: HOSPITAL | Age: 77
Discharge: HOME | End: 2017-11-02
Payer: COMMERCIAL

## 2017-11-02 DIAGNOSIS — K22.89 ESOPHAGEAL MASS: ICD-10-CM

## 2017-11-02 PROCEDURE — 74177 CT ABD & PELVIS W/CONTRAST: CPT

## 2017-11-02 PROCEDURE — 70491 CT SOFT TISSUE NECK W/DYE: CPT

## 2017-11-02 PROCEDURE — 71260 CT THORAX DX C+: CPT

## 2017-11-02 RX ADMIN — IOHEXOL 100 ML: 350 INJECTION, SOLUTION INTRAVENOUS at 13:13

## 2017-11-03 ENCOUNTER — GENERIC CONVERSION - ENCOUNTER (OUTPATIENT)
Dept: OTHER | Facility: OTHER | Age: 77
End: 2017-11-03

## 2017-11-15 ENCOUNTER — ALLSCRIPTS OFFICE VISIT (OUTPATIENT)
Dept: OTHER | Facility: OTHER | Age: 77
End: 2017-11-15

## 2017-11-15 ENCOUNTER — TRANSCRIBE ORDERS (OUTPATIENT)
Dept: ADMINISTRATIVE | Facility: HOSPITAL | Age: 77
End: 2017-11-15

## 2017-11-15 DIAGNOSIS — C15.9 MALIGNANT NEOPLASM OF ESOPHAGUS, UNSPECIFIED LOCATION (HCC): Primary | ICD-10-CM

## 2017-11-16 LAB
A/G RATIO (HISTORICAL): 1.3 (ref 1.2–2.2)
ALBUMIN SERPL BCP-MCNC: 3.9 G/DL (ref 3.5–4.8)
ALP SERPL-CCNC: 59 IU/L (ref 39–117)
ALT SERPL W P-5'-P-CCNC: 13 IU/L (ref 0–44)
AST SERPL W P-5'-P-CCNC: 19 IU/L (ref 0–40)
BASOPHILS # BLD AUTO: 0 %
BASOPHILS # BLD AUTO: 0 X10E3/UL (ref 0–0.2)
BILIRUB SERPL-MCNC: <0.2 MG/DL (ref 0–1.2)
BUN SERPL-MCNC: 23 MG/DL (ref 8–27)
BUN/CREA RATIO (HISTORICAL): 15 (ref 10–24)
CALCIUM SERPL-MCNC: 9.2 MG/DL (ref 8.6–10.2)
CARCINOEMBRYONIC ANTIGEN (HISTORICAL): 2.1 NG/ML (ref 0–4.7)
CHLORIDE SERPL-SCNC: 96 MMOL/L (ref 96–106)
CO2 SERPL-SCNC: 25 MMOL/L (ref 18–29)
CREAT SERPL-MCNC: 1.53 MG/DL (ref 0.76–1.27)
DEPRECATED RDW RBC AUTO: 14.1 % (ref 12.3–15.4)
EGFR AFRICAN AMERICAN (HISTORICAL): 51 ML/MIN/1.73
EGFR-AMERICAN CALC (HISTORICAL): 44 ML/MIN/1.73
EOSINOPHIL # BLD AUTO: 0.4 X10E3/UL (ref 0–0.4)
EOSINOPHIL # BLD AUTO: 5 %
GLUCOSE SERPL-MCNC: 125 MG/DL (ref 65–99)
HCT VFR BLD AUTO: 36.9 % (ref 37.5–51)
HGB BLD-MCNC: 12.5 G/DL (ref 12.6–17.7)
IMM.GRANULOCYTES (CD4/8) (HISTORICAL): 0 %
IMM.GRANULOCYTES (CD4/8) (HISTORICAL): 0 X10E3/UL (ref 0–0.1)
LYMPHOCYTES # BLD AUTO: 1.8 X10E3/UL (ref 0.7–3.1)
LYMPHOCYTES # BLD AUTO: 25 %
MCH RBC QN AUTO: 29.6 PG (ref 26.6–33)
MCHC RBC AUTO-ENTMCNC: 33.9 G/DL (ref 31.5–35.7)
MCV RBC AUTO: 87 FL (ref 79–97)
MONOCYTES # BLD AUTO: 0.4 X10E3/UL (ref 0.1–0.9)
MONOCYTES (HISTORICAL): 6 %
NEUTROPHILS # BLD AUTO: 4.5 X10E3/UL (ref 1.4–7)
NEUTROPHILS # BLD AUTO: 64 %
PLATELET # BLD AUTO: 232 X10E3/UL (ref 150–379)
POTASSIUM SERPL-SCNC: 4.8 MMOL/L (ref 3.5–5.2)
RBC (HISTORICAL): 4.22 X10E6/UL (ref 4.14–5.8)
SODIUM SERPL-SCNC: 136 MMOL/L (ref 134–144)
TOT. GLOBULIN, SERUM (HISTORICAL): 3.1 G/DL (ref 1.5–4.5)
TOTAL PROTEIN (HISTORICAL): 7 G/DL (ref 6–8.5)
WBC # BLD AUTO: 7.1 X10E3/UL (ref 3.4–10.8)

## 2017-11-16 RX ORDER — MULTIVITAMIN
1 TABLET ORAL DAILY
COMMUNITY

## 2017-11-16 NOTE — PRE-PROCEDURE INSTRUCTIONS
Pre-Surgery Instructions:   Medication Instructions    amiodarone 200 mg tablet Patient was instructed by Physician and understands   aspirin (ASPIRIN LOW DOSE) 81 MG tablet Patient was instructed by Physician and understands   furosemide (LASIX) 40 mg tablet Patient was instructed by Physician and understands   levothyroxine 25 mcg tablet Patient was instructed by Physician and understands   lisinopril (ZESTRIL) 5 mg tablet Patient was instructed by Physician and understands   metoprolol succinate (TOPROL-XL) 50 mg 24 hr tablet Patient was instructed by Physician and understands   Multiple Vitamin (MULTIVITAMIN) tablet Patient was instructed by Physician and understands   Omega-3-Acid Eth Est, Dietary, 1 g CAPS Patient was instructed by Physician and understands   omeprazole (PriLOSEC) 40 MG capsule Patient was instructed by Physician and understands   pravastatin (PRAVACHOL) 80 mg tablet Patient was instructed by Physician and understands   rivaroxaban (XARELTO) 20 mg tablet Patient was instructed by Physician and understands   sertraline (ZOLOFT) 50 mg tablet Patient was instructed by Physician and understands   traZODone (DESYREL) 150 mg tablet Patient was instructed by Physician and understands

## 2017-11-17 ENCOUNTER — HOSPITAL ENCOUNTER (OUTPATIENT)
Dept: RADIOLOGY | Facility: HOSPITAL | Age: 77
Setting detail: OUTPATIENT SURGERY
Discharge: HOME | End: 2017-11-17
Payer: COMMERCIAL

## 2017-11-17 ENCOUNTER — GENERIC CONVERSION - ENCOUNTER (OUTPATIENT)
Dept: GASTROENTEROLOGY | Facility: CLINIC | Age: 77
End: 2017-11-17

## 2017-11-17 ENCOUNTER — HOSPITAL ENCOUNTER (OUTPATIENT)
Facility: HOSPITAL | Age: 77
Setting detail: OBSERVATION
Discharge: HOME | End: 2017-11-18
Attending: INTERNAL MEDICINE | Admitting: FAMILY MEDICINE
Payer: COMMERCIAL

## 2017-11-17 ENCOUNTER — ANESTHESIA EVENT (OUTPATIENT)
Dept: PERIOP | Facility: HOSPITAL | Age: 77
End: 2017-11-17
Payer: COMMERCIAL

## 2017-11-17 ENCOUNTER — ANESTHESIA (OUTPATIENT)
Dept: PERIOP | Facility: HOSPITAL | Age: 77
End: 2017-11-17
Payer: COMMERCIAL

## 2017-11-17 PROBLEM — C15.9 MALIGNANT NEOPLASM OF ESOPHAGUS (HCC): Status: ACTIVE | Noted: 2017-11-17

## 2017-11-17 PROCEDURE — C9113 INJ PANTOPRAZOLE SODIUM, VIA: HCPCS | Performed by: INTERNAL MEDICINE

## 2017-11-17 PROCEDURE — C1769 GUIDE WIRE: HCPCS | Performed by: INTERNAL MEDICINE

## 2017-11-17 PROCEDURE — 72020 X-RAY EXAM OF SPINE 1 VIEW: CPT

## 2017-11-17 PROCEDURE — 87081 CULTURE SCREEN ONLY: CPT | Performed by: STUDENT IN AN ORGANIZED HEALTH CARE EDUCATION/TRAINING PROGRAM

## 2017-11-17 PROCEDURE — C1874 STENT, COATED/COV W/DEL SYS: HCPCS | Performed by: INTERNAL MEDICINE

## 2017-11-17 DEVICE — STENT SYSTEM
Type: IMPLANTABLE DEVICE | Site: ESOPHAGUS | Status: FUNCTIONAL
Brand: WALLFLEX™ ESOPHAGEAL

## 2017-11-17 RX ORDER — MORPHINE SULFATE 2 MG/ML
2 INJECTION, SOLUTION INTRAMUSCULAR; INTRAVENOUS EVERY 4 HOURS PRN
Status: DISCONTINUED | OUTPATIENT
Start: 2017-11-17 | End: 2017-11-18 | Stop reason: HOSPADM

## 2017-11-17 RX ORDER — MIDAZOLAM HYDROCHLORIDE 1 MG/ML
INJECTION INTRAMUSCULAR; INTRAVENOUS AS NEEDED
Status: DISCONTINUED | OUTPATIENT
Start: 2017-11-17 | End: 2017-11-17 | Stop reason: SURG

## 2017-11-17 RX ORDER — SODIUM CHLORIDE 9 MG/ML
70 INJECTION, SOLUTION INTRAVENOUS CONTINUOUS
Status: DISCONTINUED | OUTPATIENT
Start: 2017-11-17 | End: 2017-11-18 | Stop reason: HOSPADM

## 2017-11-17 RX ORDER — LISINOPRIL 5 MG/1
5 TABLET ORAL DAILY
Status: DISCONTINUED | OUTPATIENT
Start: 2017-11-17 | End: 2017-11-18 | Stop reason: HOSPADM

## 2017-11-17 RX ORDER — PROMETHAZINE HYDROCHLORIDE 25 MG/ML
12.5 INJECTION, SOLUTION INTRAMUSCULAR; INTRAVENOUS EVERY 8 HOURS
Status: DISCONTINUED | OUTPATIENT
Start: 2017-11-17 | End: 2017-11-17

## 2017-11-17 RX ORDER — PROPOFOL 10 MG/ML
INJECTION, EMULSION INTRAVENOUS AS NEEDED
Status: DISCONTINUED | OUTPATIENT
Start: 2017-11-17 | End: 2017-11-17 | Stop reason: SURG

## 2017-11-17 RX ORDER — METOPROLOL SUCCINATE 50 MG/1
50 TABLET, EXTENDED RELEASE ORAL DAILY
Status: DISCONTINUED | OUTPATIENT
Start: 2017-11-17 | End: 2017-11-18 | Stop reason: HOSPADM

## 2017-11-17 RX ORDER — PANTOPRAZOLE SODIUM 40 MG/1
40 INJECTION, POWDER, FOR SOLUTION INTRAVENOUS EVERY 8 HOURS
Status: DISCONTINUED | OUTPATIENT
Start: 2017-11-18 | End: 2017-11-18 | Stop reason: HOSPADM

## 2017-11-17 RX ORDER — METOPROLOL SUCCINATE 50 MG/1
50 TABLET, EXTENDED RELEASE ORAL DAILY
Status: DISCONTINUED | OUTPATIENT
Start: 2017-11-18 | End: 2017-11-17

## 2017-11-17 RX ORDER — LISINOPRIL 5 MG/1
5 TABLET ORAL DAILY
Status: DISCONTINUED | OUTPATIENT
Start: 2017-11-18 | End: 2017-11-17

## 2017-11-17 RX ADMIN — PROPOFOL 20 MG: 10 INJECTION, EMULSION INTRAVENOUS at 16:03

## 2017-11-17 RX ADMIN — METOPROLOL SUCCINATE 50 MG: 50 TABLET, FILM COATED, EXTENDED RELEASE ORAL at 22:04

## 2017-11-17 RX ADMIN — MIDAZOLAM HYDROCHLORIDE 2 MG: 1 INJECTION, SOLUTION INTRAMUSCULAR; INTRAVENOUS at 15:13

## 2017-11-17 RX ADMIN — PROPOFOL 100 MG: 10 INJECTION, EMULSION INTRAVENOUS at 15:13

## 2017-11-17 RX ADMIN — PROPOFOL 50 MG: 10 INJECTION, EMULSION INTRAVENOUS at 15:26

## 2017-11-17 RX ADMIN — PROMETHAZINE HYDROCHLORIDE 12.5 MG: 25 INJECTION INTRAMUSCULAR; INTRAVENOUS at 18:50

## 2017-11-17 RX ADMIN — LISINOPRIL 5 MG: 5 TABLET ORAL at 22:04

## 2017-11-17 RX ADMIN — PROPOFOL 50 MG: 10 INJECTION, EMULSION INTRAVENOUS at 15:41

## 2017-11-17 RX ADMIN — RIVAROXABAN 15 MG: 15 TABLET, FILM COATED ORAL at 18:49

## 2017-11-17 RX ADMIN — PROPOFOL 50 MG: 10 INJECTION, EMULSION INTRAVENOUS at 15:54

## 2017-11-17 RX ADMIN — PROPOFOL 50 MG: 10 INJECTION, EMULSION INTRAVENOUS at 15:47

## 2017-11-17 RX ADMIN — PROPOFOL 50 MG: 10 INJECTION, EMULSION INTRAVENOUS at 15:31

## 2017-11-17 RX ADMIN — PROPOFOL 50 MG: 10 INJECTION, EMULSION INTRAVENOUS at 15:20

## 2017-11-17 RX ADMIN — SODIUM CHLORIDE 80 MG: 9 INJECTION, SOLUTION INTRAVENOUS at 18:49

## 2017-11-17 RX ADMIN — PROPOFOL 50 MG: 10 INJECTION, EMULSION INTRAVENOUS at 15:57

## 2017-11-17 RX ADMIN — SODIUM CHLORIDE 70 ML/HR: 9 INJECTION, SOLUTION INTRAVENOUS at 19:25

## 2017-11-17 RX ADMIN — PROPOFOL 50 MG: 10 INJECTION, EMULSION INTRAVENOUS at 16:00

## 2017-11-17 RX ADMIN — PROPOFOL 50 MG: 10 INJECTION, EMULSION INTRAVENOUS at 15:35

## 2017-11-17 NOTE — ANESTHESIA PREPROCEDURE EVALUATION
Review of Systems/Medical History  Patient summary reviewed  Chart reviewed  No history of anesthetic complications     Cardiovascular  Pacemaker/AICD, Hyperlipidemia, Hypertension , CAD, , Dysrhythmias, atrial flutter and atrial fibrillation, CHF (Cardiomyopathy) ,   Comment: LEFT VENTRICLE:  Systolic function was mildly reduced by visual assessment  Ejection fraction was estimated in the range of 45 % to 50 %  There was mild diffuse hypokinesis with no identifiable regional variations  There was mild concentric hypertrophy  Doppler parameters were consistent with elevated mean left atrial filling pressure      VENTRICULAR SEPTUM:  There was paradoxical motion  These changes are consistent with right ventricular pacing      MITRAL VALVE:  There was mild regurgitation      AORTIC VALVE:  There was no evidence for stenosis  There was no regurgitation      TRICUSPID VALVE:  There was mild regurgitation  Pulmonary artery systolic pressure was within the normal range    ,  Pulmonary       GI/Hepatic    GERD ,        Kidney disease ARF,        Endo/Other  History of thyroid disease , hypothyroidism, Arthritis     GYN       Hematology   Musculoskeletal  Obesity  morbid obesity,        Neurology   Psychology   Anxiety,            Physical Exam    Airway    Mallampati score: II  TM Distance: >3 FB  Neck ROM: full     Dental   No notable dental hx     Cardiovascular  Cardiovascular exam normal    Pulmonary  Pulmonary exam normal     Other Findings        Anesthesia Plan  ASA Score- 4       Anesthesia Type- IV sedation with anesthesia with ASA Monitors  Additional Monitors:   Airway Plan:           Induction- intravenous  Informed Consent- Anesthetic plan and risks discussed with patient  I personally reviewed this patient with the CRNA  Discussed and agreed on the Anesthesia Plan with the CRNA  Jake Jane

## 2017-11-17 NOTE — OP NOTE
ESOPHAGOGASTRODUODENOSCOPY    PROCEDURE: EGD/ esophageal stent placemtn    INDICATIONS: obstructing esophageal tumor    POST-OP DIAGNOSIS: See the impression below    SEDATION: Monitored anesthesia care, check anesthesia records    PHYSICAL EXAM:    Vitals:    11/17/17 1615   BP: (!) 164/103   Pulse: 88   Resp: 16   Temp:    SpO2: 92%    Body mass index is 41 5 kg/m²  General: NAD  Heart: S1 & S2 normal, RRR  Lungs: CTA, No rales or rhonchi  Abdomen: Soft, nontender, nondistended, good bowel sounds    CONSENT:  Informed consent was obtained for the procedure, including sedation after explaining the risks and benefits of the procedure  Risks including but not limited to bleeding, perforation, infection, aspiration were discussed in detail  Also explained about less than 100% sensitivity with the exam and other alternatives  PREPARATION:   EKG tracing, pulse oximetry, blood pressure were monitored throughout the procedure  Patient was identified by myself both verbally and by visual inspection of ID band  DESCRIPTION:   Patient was placed in the left lateral decubitus position and was sedated with the above medication  The gastroscope was introduced in to the oropharynx and the esophagus was intubated under direct visualization  Scope was passed down the esophagus up to 2nd part of the duodenum  A careful inspection was made as the gastroscope was withdrawn, including a retroflexed view of the stomach; findings and interventions are described below  FINDINGS:    #1  Esophagus and GEJ- tight, nearly obstructing esophageal tumor at 34 centimeters very friable and ulcerated, the scope could not be advanced this  Using fluoroscopy and a guidewire a 9 to 12 extraction balloon was advanced beyond this, insufflated within the lumen of the stomach and pulled back to identify the GE junction  External radiopaque markers were placed    The balloon catheter and the scope were exchanged over the wire, a fully covered esophageal stent, 23 millimeters x 120 millimeters was advanced under fluoroscopic guidance  The stent was deployed  With good waist and positioning under fluoroscopy, the scope was reinserted to confirm position of the stent in the mid esophagus  #2  Stomach- not evaluated    #3  Duodenum- not evaluated         IMPRESSIONS:      Nearly obstructing esophageal tumor, adenocarcinoma at 34 centimeters approximately 4 centimeters in length, traversed with wire guidance and fluoroscopic guidance, a fully covered esophageal stent, 23 x 120 millimeters was advanced with good positioning    RECOMMENDATIONS:     Patient to be admitted to the floor for observation  IV fluids  Full liquid diet today  IV Phenergan 12 5 milligrams q 8 hours standing  IV Protonix 80 milligrams IV q 8 hours  Discharge home on oral medications  Pain medication as needed  Start soft diet tomorrow for the next 2 to 3 days and gradually advance to modified soft diet    COMPLICATIONS:  None; patient tolerated the procedure well            DISPOSITION: PACU           CONDITION: Stable

## 2017-11-17 NOTE — H&P
History and Physical - [facility]  Randal Wallace Sr  77 y o  male MRN: [id number]    HPI: Milton Garcia  is a 77y o  year old male who presents with newly dx obstructing esophageal cancer  Review of Systems    Historical Information   Past Medical History:   Diagnosis Date    Anxiety     Arthritis     Atrial fibrillation ([address] )     h/o atrial fib/ flutter    Cardiac disease     Disease of thyroid gland     Dysphagia     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    GERD (gastroesophageal reflux disease)     Hernia, abdominal     chronic umbilical    Hyperlipidemia     Hypertension      Past Surgical History:   Procedure Laterality Date    CARDIAC PACEMAKER PLACEMENT      CARDIAC PACEMAKER PLACEMENT Left     placed 25 years ago    ESOPHAGOGASTRODUODENOSCOPY N/A 10/31/2017    Procedure: ESOPHAGOGASTRODUODENOSCOPY (EGD); Surgeon: Jean Salas MD;  Location: [facility]; Service: Gastroenterology    FRACTURE SURGERY      LEG SURGERY      TIBIA FRACTURE SURGERY      orif left leg 1986     Social History   History   Alcohol Use No     History   Drug Use No     History   Smoking Status    Former Smoker    Packs/day: 1 00    Types: Cigarettes    Quit date: 11/16/1997   Smokeless Tobacco    Never Used     History reviewed  No pertinent family history      Meds/Allergies     Prescriptions Prior to Admission   Medication    amiodarone 200 mg tablet    aspirin (ASPIRIN LOW DOSE) 81 MG tablet    furosemide (LASIX) 40 mg tablet    levothyroxine 25 mcg tablet    lisinopril (ZESTRIL) 5 mg tablet    metoprolol succinate (TOPROL-XL) 50 mg 24 hr tablet    Multiple Vitamin (MULTIVITAMIN) tablet    Omega-3-Acid Eth Est, Dietary, 1 g CAPS    omeprazole (PriLOSEC) 40 MG capsule    pravastatin (PRAVACHOL) 80 mg tablet    rivaroxaban (XARELTO) 20 mg tablet    sertraline (ZOLOFT) 50 mg tablet    traZODone (DESYREL) 150 mg tablet    rivaroxaban (XARELTO) 15 mg tablet       No Known Allergies    Objective     Blood pressure (!) 174/73, pulse 69, temperature 99 1 °F (37 3 °C), temperature source Tympanic, resp  rate 18, height 5' 7" (1 702 m), weight 120 kg (265 lb), SpO2 98 %  PHYSICAL EXAM    Gen: NAD  CV: RRR  CHEST: Clear  ABD: soft, NT/ND  EXT: no edema  Neuro: AAO      ASSESSMENT/PLAN:  This is a 77y o  year old male here for newly dx obstructing esophageal cancer       PLAN:   Procedure: egd with covered stent

## 2017-11-17 NOTE — ANESTHESIA POSTPROCEDURE EVALUATION
Post-Op Assessment Note      CV Status:  Stable    Mental Status:  Alert and awake    Hydration Status:  Stable    PONV Controlled:  None    Airway Patency:  Patent    Post Op Vitals Reviewed: Yes          Staff: Anesthesiologist           BP     Temp      Pulse     Resp      SpO2

## 2017-11-18 VITALS
SYSTOLIC BLOOD PRESSURE: 140 MMHG | TEMPERATURE: 97.4 F | WEIGHT: 265 LBS | HEIGHT: 67 IN | OXYGEN SATURATION: 95 % | HEART RATE: 68 BPM | RESPIRATION RATE: 18 BRPM | BODY MASS INDEX: 41.59 KG/M2 | DIASTOLIC BLOOD PRESSURE: 63 MMHG

## 2017-11-18 PROBLEM — I50.42 CHRONIC COMBINED SYSTOLIC AND DIASTOLIC HEART FAILURE (HCC): Chronic | Status: RESOLVED | Noted: 2017-08-31 | Resolved: 2017-11-18

## 2017-11-18 PROBLEM — Z98.890 HISTORY OF ESOPHAGOGASTRODUODENOSCOPY (EGD): Status: ACTIVE | Noted: 2017-11-18

## 2017-11-18 PROBLEM — Z98.890 HISTORY OF ESOPHAGOGASTRODUODENOSCOPY (EGD): Status: RESOLVED | Noted: 2017-11-18 | Resolved: 2017-11-18

## 2017-11-18 PROBLEM — N17.9 AKI (ACUTE KIDNEY INJURY) (HCC): Status: RESOLVED | Noted: 2017-08-30 | Resolved: 2017-11-18

## 2017-11-18 LAB
ANION GAP SERPL CALCULATED.3IONS-SCNC: 8 MMOL/L (ref 4–13)
BUN SERPL-MCNC: 17 MG/DL (ref 5–25)
CALCIUM SERPL-MCNC: 8.7 MG/DL (ref 8.3–10.1)
CHLORIDE SERPL-SCNC: 104 MMOL/L (ref 100–108)
CO2 SERPL-SCNC: 26 MMOL/L (ref 21–32)
CREAT SERPL-MCNC: 1.41 MG/DL (ref 0.6–1.3)
ERYTHROCYTE [DISTWIDTH] IN BLOOD BY AUTOMATED COUNT: 13.4 % (ref 11.6–15.1)
GFR SERPL CREATININE-BSD FRML MDRD: 48 ML/MIN/1.73SQ M
GLUCOSE SERPL-MCNC: 104 MG/DL (ref 65–140)
HCT VFR BLD AUTO: 38.7 % (ref 42–52)
HGB BLD-MCNC: 12.8 G/DL (ref 14–18)
MAGNESIUM SERPL-MCNC: 1.9 MG/DL (ref 1.6–2.6)
MCH RBC QN AUTO: 29.7 PG (ref 27–31)
MCHC RBC AUTO-ENTMCNC: 33.2 G/DL (ref 31.4–37.4)
MCV RBC AUTO: 90 FL (ref 82–98)
PHOSPHATE SERPL-MCNC: 2.4 MG/DL (ref 2.3–4.1)
PLATELET # BLD AUTO: 226 THOUSANDS/UL (ref 130–400)
PMV BLD AUTO: 7.4 FL (ref 8.9–12.7)
POTASSIUM SERPL-SCNC: 4.6 MMOL/L (ref 3.5–5.3)
RBC # BLD AUTO: 4.32 MILLION/UL (ref 4.7–6.1)
SODIUM SERPL-SCNC: 138 MMOL/L (ref 136–145)
WBC # BLD AUTO: 8.1 THOUSAND/UL (ref 4.8–10.8)

## 2017-11-18 PROCEDURE — 85027 COMPLETE CBC AUTOMATED: CPT | Performed by: STUDENT IN AN ORGANIZED HEALTH CARE EDUCATION/TRAINING PROGRAM

## 2017-11-18 PROCEDURE — 80048 BASIC METABOLIC PNL TOTAL CA: CPT | Performed by: STUDENT IN AN ORGANIZED HEALTH CARE EDUCATION/TRAINING PROGRAM

## 2017-11-18 PROCEDURE — 83735 ASSAY OF MAGNESIUM: CPT | Performed by: STUDENT IN AN ORGANIZED HEALTH CARE EDUCATION/TRAINING PROGRAM

## 2017-11-18 PROCEDURE — C9113 INJ PANTOPRAZOLE SODIUM, VIA: HCPCS | Performed by: INTERNAL MEDICINE

## 2017-11-18 PROCEDURE — 84100 ASSAY OF PHOSPHORUS: CPT | Performed by: STUDENT IN AN ORGANIZED HEALTH CARE EDUCATION/TRAINING PROGRAM

## 2017-11-18 RX ORDER — ONDANSETRON 2 MG/ML
4 INJECTION INTRAMUSCULAR; INTRAVENOUS EVERY 6 HOURS PRN
Status: DISCONTINUED | OUTPATIENT
Start: 2017-11-18 | End: 2017-11-18 | Stop reason: HOSPADM

## 2017-11-18 RX ADMIN — RIVAROXABAN 15 MG: 15 TABLET, FILM COATED ORAL at 09:10

## 2017-11-18 RX ADMIN — PANTOPRAZOLE SODIUM 40 MG: 40 INJECTION, POWDER, FOR SOLUTION INTRAVENOUS at 12:43

## 2017-11-18 RX ADMIN — RIVAROXABAN 15 MG: 15 TABLET, FILM COATED ORAL at 18:39

## 2017-11-18 RX ADMIN — METOPROLOL SUCCINATE 50 MG: 50 TABLET, FILM COATED, EXTENDED RELEASE ORAL at 09:10

## 2017-11-18 RX ADMIN — PANTOPRAZOLE SODIUM 40 MG: 40 INJECTION, POWDER, FOR SOLUTION INTRAVENOUS at 18:39

## 2017-11-18 RX ADMIN — PANTOPRAZOLE SODIUM 40 MG: 40 INJECTION, POWDER, FOR SOLUTION INTRAVENOUS at 02:40

## 2017-11-18 RX ADMIN — LISINOPRIL 5 MG: 5 TABLET ORAL at 09:10

## 2017-11-18 NOTE — DISCHARGE INSTRUCTIONS
Acute Kidney Injury   AMBULATORY CARE:   Acute kidney injury (LIVAN) is also called acute kidney failure, or acute renal failure  LIVAN happens when your kidneys suddenly stop working correctly  Normally, the kidneys remove fluid, chemicals, and waste from your blood  These wastes are turned into urine by your kidneys  LIVAN usually happens over hours or days  When you have LIVAN, your kidneys do not remove the waste, chemicals, or extra fluid from your body  A normal amount of urine is not produced  LIVAN is usually temporary, but it may become a chronic kidney condition  Causes of LIVAN:   · Decreased blood flow to the kidney, such as from hypercalcemia (high blood calcium level) or severe heart disease     · A disease or condition that affects the kidneys, such as hypertension (high blood pressure) or diabetes     · A blockage in the kidney or ureter, such as a kidney or bladder stone, enlarged prostate, or tumor  Common symptoms include the following: You may not have any symptoms with early or mild LIVAN  As LIVAN progresses, you may have any of the following:  · Decrease in the amount of urine or no urination    · Swelling in your arms, legs, or feet     · Weakness, drowsiness, or no appetite    · Nausea, flank pain, muscle twitching or muscle cramps    · Itchy skin, or your, breath or body smells like urine    · Behavior changes, confusion, disorientation, or seizures  Call 911 if:   · You have sudden chest pain or trouble breathing  Seek care immediately if:   · Your symptoms get worse  Contact your healthcare provider if:   · Your symptoms return  · Your blood sugar or blood pressure level is not within the range your healthcare provider recommends  · You have questions or concerns about your condition or care  Treatment for LIVAN  depends upon the cause of your acute kidney injury and how severe it is   Usually, LIVAN will be monitored in the hospital  If you have mild LIVAN, you may be able to go home to recover  Your healthcare providers will treat the cause of your LIVAN  You may need IV fluids if your LIVAN was caused by little or no fluid in your body  You may need dialysis to remove waste and extra fluid from your body  Nutrition:  Your healthcare provider may tell you to eat food low in sodium (salt), potassium, phosphorus, or protein  A dietitian can help you plan your meals  Drink liquids as directed: Your healthcare provider may recommend that you drink a certain amount of liquids  This will help your kidneys work better and decrease your risk for dehydration  Ask how much liquid to drink each day and which liquids are best for you  What you can do to manage and prevent LIVAN:   · Monitor and manage other health conditions  such as diabetes, high blood pressure, or heart disease  These conditions increase your risk for acute kidney injury  Take your medicines for these conditions as directed  Also, monitor your blood sugar and blood pressure levels as directed  Contact your healthcare provider if your levels are not in the range he or she says it should be  · Talk to your healthcare provider before you take over-the-counter-medicine  NSAIDs, stomach medicine, or laxatives may harm your kidneys and increase your risk for acute kidney injury  If it is okay to take the medicine, follow the directions on the package  Do not take more than directed  · Tell healthcare providers you have had acute kidney injury  before you get contrast liquid for an x-ray or CT scan  Your healthcare provider may give you medicine to prevent kidney problems caused by the liquid  Follow up with your healthcare provider as directed:  Write down your questions so you remember to ask them during your visits  © 2017 [address] [facility] Information is for End User's use only and may not be sold, redistributed or otherwise used for commercial purposes   All illustrations and images included in CareNotes® are the copyrighted property of [employer]  or [employer]  The above information is an  only  It is not intended as medical advice for individual conditions or treatments  Talk to your doctor, nurse or pharmacist before following any medical regimen to see if it is safe and effective for you  Esophageal Cancer   WHAT YOU NEED TO KNOW:   What is esophageal cancer? Esophageal cancer starts in the cells that line the esophagus  What increases my risk for esophageal cancer? · Alcohol use    · Smoking cigarettes or chewing tobacco    · Roa esophagus     · High-fat foods such as in fried foods, chips, and some pork or beef dishes    · Gastroesophageal reflux disease  What are the signs and symptoms of esophageal cancer? You may not have any signs and symptoms at first  You may develop more than one of the following over time:  · Difficult or painful swallowing    · Nausea and vomiting    · Chest or stomach pain or discomfort    · Bloody bowel movements or diarrhea    · Loss of appetite    · Weight loss without trying  How is esophageal cancer diagnosed? · A barium swallow  is an x-ray of your esophagus and stomach  You will drink a white chalky liquid called barium to help your esophagus show up better on an x-ray  · A CT or MRI  may show cancer and if it has spread  You may be given contrast liquid to help the cancer show up better  Tell the healthcare provider if you have ever had an allergic reaction to contrast liquid  Do not enter the MRI room with anything metal  Metal can cause serious injury  Tell the healthcare provider if you have any metal in or on your body  · Endoscopy  is used to examine the lining of your esophagus, stomach, and part of your small intestine  Your healthcare provider uses a small tube with a camera on the end  · A biopsy  may be used to take a sample of tissue from your esophagus to be tested for cancer    How is esophageal cancer treated? · Surgery  may be needed to remove part of your esophagus or lymph nodes  This may help stop the cancer from spreading  · Chemotherapy  is used to kill cancer cells  Chemotherapy may also be used to shrink the tumor or lymph nodes before surgery  Once the tumor is smaller, surgery can be done to remove the cancer  · Radiation therapy  is used to kill cancer cells with x-rays or gamma rays  Radiation may be given after surgery to kill cancer cells that were not removed  It may be given alone or with chemotherapy  What can I do to manage my esophageal cancer? · Do not smoke  Nicotine can damage blood vessels and make it more difficult to manage your esophageal cancer  Smoking also increases your risk for new or returning cancer and delays healing after treatment  Do not use e-cigarettes or smokeless tobacco in place of cigarettes or to help you quit  They still contain nicotine  Ask your healthcare provider for information if you currently smoke and need help quitting  · Limit or do not drink alcohol as directed  Men should limit alcohol to 2 drinks per day  Women should limit alcohol to 1 drink per day  A drink of alcohol is 12 ounces of beer, 5 ounces of wine, or 1½ ounces of liquor  · Eat healthy foods  Healthy foods include fruits, vegetables, whole-grain breads, low-fat dairy products, beans, lean meats, and fish  Take small bites, and chew your food well before you swallow  Be especially careful when you eat meat, fruits, and vegetables  You may need to change what you eat during treatment  A dietitian may help to plan the best meals and snacks for you  · Drink liquids as directed  If you have nausea or diarrhea from cancer treatment, extra liquids may help decrease your risk for dehydration  Ask how much liquid to drink each day and which liquids are best for you  · Exercise as directed  Exercise may help increase your energy level and appetite   Ask your healthcare provider how much exercise you need and which exercises are best for you  Call 911 for any of the following:   · Your arm or leg feels warm, tender, and painful  It may look swollen and red  · You suddenly feel lightheaded and short of breath  · You have chest pain when you take a deep breath or cough  · You cough up blood  When should I contact my healthcare provider? · You have a fever  · You vomit multiple times and cannot keep food or liquids down  · You feel you cannot cope with your illness  · You are bleeding from your mouth or nose  · You have pain that does not decrease or go away after you take your pain medicine  · You have questions or concerns about your condition or care  CARE AGREEMENT:   You have the right to help plan your care  Learn about your health condition and how it may be treated  Discuss treatment options with your caregivers to decide what care you want to receive  You always have the right to refuse treatment  The above information is an  only  It is not intended as medical advice for individual conditions or treatments  Talk to your doctor, nurse or pharmacist before following any medical regimen to see if it is safe and effective for you  © 2017 [address] [employer] Information is for End User's use only and may not be sold, redistributed or otherwise used for commercial purposes  All illustrations and images included in CareNotes® are the copyrighted property of [employer]  or [employer]

## 2017-11-18 NOTE — SOCIAL WORK
DASH discussion completed  Discussed goals of making sure pt's needs are met upon discharge, pt's preferences are taken into account, pt understands her health condition, medications and symptoms to watch for after returning home and pt is aware of any follow up appointments recommended by hospital physician  PT SLEEPING, AGUILA SPOKE WITH PT FRIEND WHO IS THE MOTHER OF HIS GRANDSON  PT IS GENERALLY INDEPENDENT, HAS NO HHC BUT DOES HAVE A WALKER THAT HE USES FOR AMBULATION    PT USES [address] [place], [place]

## 2017-11-18 NOTE — PROGRESS NOTES
[address] [facility] [address] [facility] Practice Progress Note - Rey Rahman  77 y o  male MRN: [id number]    Unit/Bed#: 2 [place] [address] A Encounter: [id number]      Assessment/Plan:    #1 - Obstructing Esophageal Cancer:  · Post-Op Day #1 s/p Esophageal Stent   · Continue IV NS 70 cc per  · Continue full liquid diet  · Continue IV Protonix 80mg IV q8h  · Continue with Morphine 2mg IV q4h prn    #2 - Confusion:   · Likely 2/2 to Anesthesia vs  Phenergan affect  · Resolved; patient is AAO x3 this morning    #3 - Hypothyroidism:  · Continue to Hold Medication    #4 - GERD:  · Stable; continue with Protonix 80mg IV q8h    #5 - Hypertension:  · Stable; continue with home medication regimen  · Lisinopril 5mg PO qd and Metoprolol 50mg PO qd    #6 - Hx Atrial fibrillation:  · Stable:  Continue with Xarelto 15 mg PO bid with meals    FEN:  · Liquid Diet / IV NS 70cc/hr / Xarelto + SCDs      Subjective:   Patient seen and examined at bedside  Notes no new complaints overnight  Patient is aware of surgical procedure performed yesterday  Patient is alert and oriented x3  Denies any shortness of breath, chest pain, abdominal pain, nausea, vomiting, and diarrhea  Has passed flatulence and reports good bowel movements  Objective:     Vitals: Blood pressure 136/78, pulse 88, temperature 98 4 °F (36 9 °C), temperature source Tympanic, resp  rate 18, height 5' 7" (1 702 m), weight 120 kg (265 lb), SpO2 94 %  ,Body mass index is 41 5 kg/m²    Wt Readings from Last 3 Encounters:   11/17/17 120 kg (265 lb)   09/01/17 125 kg (276 lb 7 3 oz)       Intake/Output Summary (Last 24 hours) at 11/18/17 0724  Last data filed at 11/18/17 0518   Gross per 24 hour   Intake              100 ml   Output              650 ml   Net             -550 ml       Physical Exam:   General appearance: alert, appears stated age, cooperative and no distress  Lungs: clear to auscultation bilaterally  Heart: regular rate and rhythm, S1, S2 normal, no murmur, click, rub or gallop  Abdomen: soft, non-tender; bowel sounds normal; no masses,  no organomegaly     Recent Results (from the past 24 hour(s))   Basic metabolic panel    Collection Time: 11/18/17  5:47 AM   Result Value Ref Range    Sodium 138 136 - 145 mmol/L    Potassium 4 6 3 5 - 5 3 mmol/L    Chloride 104 100 - 108 mmol/L    CO2 26 21 - 32 mmol/L    Anion Gap 8 4 - 13 mmol/L    BUN 17 5 - 25 mg/dL    Creatinine 1 41 (H) 0 60 - 1 30 mg/dL    Glucose 104 65 - 140 mg/dL    Calcium 8 7 8 3 - 10 1 mg/dL    eGFR 48 ml/min/1 73sq m   Magnesium    Collection Time: 11/18/17  5:47 AM   Result Value Ref Range    Magnesium 1 9 1 6 - 2 6 mg/dL   CBC (With Platelets)    Collection Time: 11/18/17  5:47 AM   Result Value Ref Range    WBC 8 10 4 80 - 10 80 Thousand/uL    RBC 4 32 (L) 4 70 - 6 10 Million/uL    Hemoglobin 12 8 (L) 14 0 - 18 0 g/dL    Hematocrit 38 7 (L) 42 0 - 52 0 %    MCV 90 82 - 98 fL    MCH 29 7 27 0 - 31 0 pg    MCHC 33 2 31 4 - 37 4 g/dL    RDW 13 4 11 6 - 15 1 %    Platelets 355 730 - 668 Thousands/uL    MPV 7 4 (L) 8 9 - 12 7 fL   Phosphorus    Collection Time: 11/18/17  5:47 AM   Result Value Ref Range    Phosphorus 2 4 2 3 - 4 1 mg/dL       Current Facility-Administered Medications   Medication Dose Route Frequency Provider Last Rate Last Dose    lisinopril (ZESTRIL) tablet 5 mg  5 mg Oral Daily Kiya Serrano DO   5 mg at 11/17/17 2204    metoprolol succinate (TOPROL-XL) 24 hr tablet 50 mg  50 mg Oral Daily Kiya Serrano DO   50 mg at 11/17/17 2204    morphine injection 2 mg  2 mg Intravenous Q4H PRN Margret Cat MD        ondansetron (ZOFRAN) injection 4 mg  4 mg Intravenous Q6H PRN Margret Cat MD        pantoprazole (PROTONIX) injection 40 mg  40 mg Intravenous Q8H Deni Dennis MD   40 mg at 11/18/17 0240    rivaroxaban (XARELTO) tablet 15 mg  15 mg Oral BID With Meals Kiya Serrano DO   15 mg at 11/17/17 1849    sodium chloride 0 9 % infusion  70 mL/hr Intravenous Continuous Gerardo Duff MD 70 mL/hr at 11/17/17 1925 70 mL/hr at 11/17/17 1925       Invasive Devices     Peripheral Intravenous Line            Peripheral IV 11/17/17 less than 1 day                Lab, Imaging and other studies: I have personally reviewed pertinent reports      VTE Pharmacologic Prophylaxis: Xarelto  VTE Mechanical Prophylaxis: sequential compression device          Marine Vinson MD

## 2017-11-18 NOTE — PLAN OF CARE
DISCHARGE PLANNING     Discharge to home or other facility with appropriate resources Progressing        GASTROINTESTINAL - ADULT     Maintains adequate nutritional intake Progressing        INFECTION - ADULT     Absence or prevention of progression during hospitalization Progressing        Knowledge Deficit     Patient/family/caregiver demonstrates understanding of disease process, treatment plan, medications, and discharge instructions Progressing        PAIN - ADULT     Verbalizes/displays adequate comfort level or baseline comfort level Progressing

## 2017-11-18 NOTE — PLAN OF CARE
DISCHARGE PLANNING     Discharge to home or other facility with appropriate resources Progressing        GASTROINTESTINAL - ADULT     Maintains adequate nutritional intake Progressing        INFECTION - ADULT     Absence or prevention of progression during hospitalization Progressing        Knowledge Deficit     Patient/family/caregiver demonstrates understanding of disease process, treatment plan, medications, and discharge instructions Progressing        PAIN - ADULT     Verbalizes/displays adequate comfort level or baseline comfort level Progressing        Prexisting or High Potential for Compromised Skin Integrity     Skin integrity is maintained or improved Progressing

## 2017-11-18 NOTE — H&P
H&P Exam - Margie Cormier   77 y o  male MRN: [id number]    Unit/Bed#: 2 [place] [address] A Encounter: [id number]    Assessment/Plan:  76 y/o male with PMH Anxiety, Arthritis, A-fib, cardiac disease, disease of thyroid gland, dysphagia , GERD, HLD, HTN s/p EGD and esophageal stent placement is admitted for observation under the supervision of Dr Jayme Schmitz  Patient is expected to go home after discharge  Patient is full code  Plan discussed and agreed upon with Dr Jayme Schmitz  Day 0 s/p postop EGD and esophageal stent placement   - IV NS 70cc/hr started     - PT given 1 dose of Phenegan 12 5mg IV Q8H as was recommended by Dr Darlene Holman  Soon after the patient received the medication, he became increasingly confused  Spoke to Dr Chikis Carcamo, covering GI on call and she agreed to stop the Phenergan at this time    - Start liquid diet   - f/u with Dr Sid Pratt pending in the AM    Episode of confusion, likely 2/2 anesthesia vs phengan effect  - Will call Dr Darlene Holman regarded the onset of confusion   - PT given 1 dose of Phenegan 12 5mg IV Q8H as was recommended by Dr Darlene Holman  Soon after the patient received the medication, he became increasingly confused  Spoke to Dr Chikis Carcamo, covering GI on call and she agreed to stop the Phenergan at this time  - Will start Neuro check Q4H  - Will monitor closely  - If pt starts to feel nauseas, order zofran PRN     Hx of A-Fib  - Pt restarted on Xarelto 15mg Po BID with meals  - stable     Hpothyroidism  Hold meds per GI    GERD  - Continue Protonix 80mg IV Q8H  - stable    HTN   - stable  - will restart home medication lisinopril 5 mg p o  daily and metoprolol 50 mg p o   Daily today    HLD  - stable  - will hold home medication for now     Global  Full Liquid Diet, IV NS 70 cc/hr, Xarelto for DVT prophylaxis    History of Present Illness   76 y/o male with PMH Anxiety, Arthritis, A-fib, cardiac disease, disease of thyroid gland, dysphagia , GERD, HLD, HTN, is brought to the floor for observation s/p EGD/esophageal stent placement  On initial evaluation, patient was confused, did not know what year it was or why he was getting the procedure  On reevaluation a few minutes later, pt is less confused and answered questions appropriately  Pt states that he was getting the procedure today because he has cancer in his esophagus  Pt denies any pain at this time  Pt denies chest pain, shortness of breath, nausea, abdominal pain  Review of Systems   Constitutional: Negative for chills, fatigue and fever  HENT: Negative for ear discharge, ear pain and hearing loss  Respiratory: Negative for choking, chest tightness, shortness of breath and wheezing  Gastrointestinal: Negative for abdominal pain, constipation, diarrhea, nausea and vomiting  Genitourinary: Negative for enuresis, frequency and urgency  Musculoskeletal: Negative for arthralgias, back pain, myalgias and neck pain  Skin: Negative for rash and wound  Neurological: Negative for dizziness, seizures, weakness and headaches  Psychiatric/Behavioral: Positive for agitation and confusion  Negative for behavioral problems, decreased concentration and self-injury  The patient is not nervous/anxious and is not hyperactive  Historical Information   Past Medical History:   Diagnosis Date    Anxiety     Arthritis     Atrial fibrillation ([address] )     h/o atrial fib/ flutter    Cardiac disease     Disease of thyroid gland     Dysphagia     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    GERD (gastroesophageal reflux disease)     Hernia, abdominal     chronic umbilical    Hyperlipidemia     Hypertension      Past Surgical History:   Procedure Laterality Date    CARDIAC PACEMAKER PLACEMENT      CARDIAC PACEMAKER PLACEMENT Left     placed 25 years ago    ESOPHAGOGASTRODUODENOSCOPY N/A 10/31/2017    Procedure: ESOPHAGOGASTRODUODENOSCOPY (EGD); Surgeon: Lois Monge MD;  Location: [facility];   Service: Gastroenterology    FRACTURE SURGERY      LEG SURGERY      TIBIA FRACTURE SURGERY      orif left leg 1986     Social History   History   Alcohol Use No     History   Drug Use No     History   Smoking Status    Former Smoker    Packs/day: 1 00    Types: Cigarettes    Quit date: 11/16/1997   Smokeless Tobacco    Never Used     Family History: non-contributory    Meds/Allergies   all medications and allergies reviewed  No Known Allergies    Objective   First Vitals:   Blood Pressure: (!) 174/73 (11/17/17 1425)  Pulse: 69 (11/17/17 1425)  Temperature: 99 1 °F (37 3 °C) (11/17/17 1425)  Temp Source: Tympanic (11/17/17 1425)  Respirations: 18 (11/17/17 1425)  Height: 5' 7" (170 2 cm) (11/16/17 0918)  Weight - Scale: 120 kg (265 lb) (11/16/17 0918)  SpO2: 98 % (11/17/17 1425)    Current Vitals:   Blood Pressure: 164/75 (11/17/17 1713)  Pulse: 85 (11/17/17 1713)  Temperature: (!) 97 3 °F (36 3 °C) (11/17/17 1713)  Temp Source: Oral (11/17/17 1713)  Respirations: 16 (11/17/17 1713)  Height: 5' 7" (170 2 cm) (11/17/17 1713)  Weight - Scale: 120 kg (265 lb) (11/17/17 1713)  SpO2: 94 % (11/17/17 1713)      Intake/Output Summary (Last 24 hours) at 11/17/17 1958  Last data filed at 11/17/17 1659   Gross per 24 hour   Intake              100 ml   Output                0 ml   Net              100 ml       Invasive Devices     Peripheral Intravenous Line            Peripheral IV 11/17/17 less than 1 day                Physical Exam   Constitutional: He appears well-developed and well-nourished  He appears lethargic  No distress  HENT:   Head: Normocephalic and atraumatic  Right Ear: External ear normal    Left Ear: External ear normal    Nose: Nose normal    Mouth/Throat: Oropharynx is clear and moist  No oropharyngeal exudate  Eyes: EOM are normal  Right eye exhibits no discharge  Left eye exhibits no discharge  No scleral icterus     Cardiovascular: Normal rate, regular rhythm, normal heart sounds and intact distal pulses  Exam reveals no gallop and no friction rub  No murmur heard  Pulmonary/Chest: Effort normal and breath sounds normal  No respiratory distress  He has no wheezes  He has no rales  He exhibits no tenderness  Abdominal: Soft  Bowel sounds are normal  He exhibits no distension  There is no tenderness  There is no rebound  Musculoskeletal: Normal range of motion  Right ankle: He exhibits deformity  Neurological: He has normal strength  He appears lethargic  He is disoriented  Skin: Skin is warm  He is not diaphoretic  Lab Results:   Results for orders placed or performed during the hospital encounter of 10/31/17   Tissue Exam   Result Value Ref Range    Case Report       Surgical Pathology Report                         Case: [id number]                                   Authorizing Provider:  Jenni Henry MD            Collected:           10/31/2017 3165              Ordering Location:     [facility]   Received:            10/31/2017 [address] [place] [address]                                                                       Pathologist:           Tommie Houser MD                                                               Specimen:    Esophagus, Bx Distal Esophageal Tumor--esophagus @33 cm                                    Addendum       Mucicarmine is focally positive in lumina and rare cell  The original diagnosis is UNCHANGED  Final Diagnosis       A   Distal esophagus, tumor (biopsy):  - Poorly differentiated adenocarcinoma  - Background intestinal metaplasia     Comment:   - Intradepartmental consultation concurs with the diagnosis of adenocarcinoma  - Unstained slides from block A1 are available for molecular testing   - Dr Julio C Ernst notified of diagnosis on 11/2/17 at 9:21 am     Interpretation performed at , [address]         Additional Information       All controls performed with the immunohistochemical stains reported above reacted appropriately  These tests were developed and their performance characteristics determined by [facility] or [facility]  They may not be cleared or approved by the [place]  Food and Drug Administration  The FDA has determined that such clearance or approval is not necessary  These tests are used for clinical purposes  They should not be regarded as investigational or for research  This laboratory has been approved by [place] [id number], designated as a high-complexity laboratory and is qualified to perform these tests  Gross Description       A  The specimen is received in formalin, labeled with the patient's name and medical record number, and is designated " Biopsy distal esophageal tumor, esophagus at 33 cm"  The specimen consists of multiple tan-red soft tissue fragments measuring in aggregate 0 5 x 0 5 x 0 1 cm  Entirely submitted  One cassette  Note: The estimated total formalin fixation time based upon information provided by the submitting clinician and the standard processing schedule is less than 72 hours  MAC       Clinical Information       Suspected esophageal cancer; please expedite/rush specimen     Imaging:   XR spine cervical 1 view    (Results Pending)     EKG, Pathology, and Other Studies:     Code Status: Level 1 - Full Code  Advance Directive and Living Will:      Power of :    POLST:      Counseling / Coordination of Care: Total floor / unit time spent today 45 minutes  and Greater than 50% of total time was spent with the patient and / or family counseling and / or coordination of care  A description of the counseling / coordination of care: Georgette Ormond

## 2017-11-18 NOTE — CASE MANAGEMENT
Initial Clinical Review    Admission: Date/Time/Statement: OBSERVATION 11/17/17 @ 1730    Orders Placed This Encounter   Procedures    Place in Observation     Standing Status:   Standing     Number of Occurrences:   1     Order Specific Question:   Admitting Physician     Answer:   Papi Pedro     Order Specific Question:   Level of Care     Answer:   Med Surg [16]   ED: Date/Time/Mode of Arrival:   ED Arrival Information     Patient not seen in ED                   Chief Complaint: No chief complaint on file  History of Illness:   ED Vital Signs:   ED Triage Vitals   Temperature Pulse Respirations Blood Pressure SpO2   11/17/17 1425 11/17/17 1425 11/17/17 1425 11/17/17 1425 11/17/17 1425   99 1 °F (37 3 °C) 69 18 (!) 174/73 98 %      Temp Source Heart Rate Source Patient Position - Orthostatic VS BP Location FiO2 (%)   11/17/17 1425 11/17/17 1425 11/17/17 1713 11/17/17 1713 --   Tympanic Monitor Lying Right arm       Pain Score       11/17/17 1809       1        Wt Readings from Last 1 Encounters:   11/17/17 120 kg (265 lb)   Vital Signs (abnormal): Abnormal Labs/Diagnostic Test Results:   ED Treatment:   Medication Administration - No Administrations Displayed (No Start Event Found)     None      Past Medical/Surgical History:    Active Ambulatory Problems     Diagnosis Date Noted    Atrial flutter with rapid ventricular response ([facility] ) 08/30/2017    Dysphagia 08/30/2017    LIVAN (acute kidney injury) ([facility] ) 08/30/2017    Hypothyroidism 08/30/2017    Anxiety disorder 08/31/2017    Body mass index (BMI) of 45 0-49 9 in adult ([facility] ) 08/31/2017    Cardiomyopathy ([facility] ) 08/31/2017    Chronic combined systolic and diastolic heart failure ([facility] ) 08/31/2017    Chronic kidney disease 08/31/2017    Chronic coronary artery disease 08/31/2017    Mixed anxiety depressive disorder 08/31/2017    Gastroesophageal reflux disease 08/31/2017    Multiple-type hyperlipidemia 08/31/2017    Presence of cardiac pacemaker 08/31/2017     Resolved Ambulatory Problems     Diagnosis Date Noted    No Resolved Ambulatory Problems     Past Medical History:   Diagnosis Date    Anxiety     Arthritis     Atrial fibrillation ([address] )     Cardiac disease     Disease of thyroid gland     Dysphagia     GERD (gastroesophageal reflux disease)     Hernia, abdominal     Hyperlipidemia     Hypertension    Admitting Diagnosis: Malignant neoplasm of esophagus (HCC) [C15 9]  Age/Sex: 77 y o  male  Assessment/Plan  S/P EGD WITH ESOPHAGEAL STENT PLACEMENT  IMPRESSIONS:    Nearly obstructing esophageal tumor, adenocarcinoma at 34 centimeters approximately 4 centimeters in length, traversed with wire guidance and fluoroscopic guidance, a fully covered esophageal stent, 23 x 120 millimeters was advanced with good positioning  RECOMMENDATIONS:   Patient to be admitted to the floor for observation  IV fluids  Full liquid diet today  IV Phenergan 12 5 milligrams q 8 hours standing  IV Protonix 80 milligrams IV q 8 hours  Discharge home on oral medications  Pain medication as needed  Start soft diet tomorrow for the next 2 to 3 days and gradually advance to modified soft diet  Day 0 s/p postop EGD and esophageal stent placement   - IV NS 70cc/hr started     - PT given 1 dose of Phenegan 12 5mg IV Q8H as was recommended by Dr Luisana Shah  Soon after the patient received the medication, he became increasingly confused  Spoke to Dr Stef Toledo, covering GI on call and she agreed to stop the Phenergan at this time    - Start liquid diet   - f/u with Dr Fredrick Grimes pending in the AM  Episode of confusion, likely 2/2 anesthesia vs phengan effect  - Will call Dr Luisana Shah regarded the onset of confusion   - PT given 1 dose of Phenegan 12 5mg IV Q8H as was recommended by Dr Luisana Shah  Soon after the patient received the medication, he became increasingly confused  Spoke to Dr Stef Toledo, covering GI on call and she agreed to stop the Phenergan at this time     - Will start Neuro check Q4H  - Will monitor closely  - If pt starts to feel nauseas, order zofran PRN   Hx of A-Fib  - Pt restarted on Xarelto 15mg Po BID with meals  - stable   Hpothyroidism  Hold meds per GI  GERD  - Continue Protonix 80mg IV Q8H  - stable  HTN   - stable  - will restart home medication lisinopril 5 mg p o  daily and metoprolol 50 mg p o   Daily today  HLD  - stable  - will hold home medication for now   Global  Full Liquid Diet, IV NS 70 cc/hr, Xarelto for DVT prophylaxis  Admission Orders:  MED SURG  STRICT BEDREST  BLE VENODYNES  FULL LIQUIDS  NEURO CHECKS Q4H  Scheduled Meds:   lisinopril 5 mg Oral Daily   metoprolol succinate 50 mg Oral Daily   pantoprazole 40 mg Intravenous Q8H   rivaroxaban 15 mg Oral BID With Meals     Continuous Infusions:   sodium chloride 70 mL/hr Last Rate: 70 mL/hr (11/17/17 1925)     PRN Meds: morphine injection    ondansetron

## 2017-11-18 NOTE — PLAN OF CARE
Problem: DISCHARGE PLANNING - CARE MANAGEMENT  Goal: Discharge to post-acute care or home with appropriate resources  INTERVENTIONS:  - Conduct assessment to determine patient/family and health care team treatment goals, and need for post-acute services based on payer coverage, community resources, and patient preferences, and barriers to discharge  - Address psychosocial, clinical, and financial barriers to discharge as identified in assessment in conjunction with the patient/family and health care team  - Arrange appropriate level of post-acute services according to patient's   needs and preference and payer coverage in collaboration with the physician and health care team  - Communicate with and update the patient/family, physician, and health care team regarding progress on the discharge plan  - Arrange appropriate transportation to post-acute venues  HOME INDEPENDENTLY  Outcome: Progressing

## 2017-11-18 NOTE — DISCHARGE SUMMARY
Suha Todd Discharge Summary - Medical Charisse Reyes Sr  77 y o  male MRN: [id number]  [facility] PACU Room / Bed: [address] [facility] A Encounter: [id number]    BRIEF OVERVIEW  Admitting Provider: Scott Davies MD  Discharge Provider: Brittani Mathur MD    Discharge To: Home  Facility: None    Outpatient Follow-Up:    · Please follow up with Suha Todd within 7 days; please call to schedule an appointment    Things to address at first follow up visit:  · Acute kidney injury; follow-up with BMP  · Esophageal cancer s/p esophageal stent  · Dysphagia    Labs and results pending at discharge:  · MRSA Culture    Admission Date: 11/17/2017       Discharge Date: 11/18/17    Primary Discharge Diagnosis:  Principal Problem:    Esophageal cancer ([facility] )  Active Problems:    Atrial flutter with rapid ventricular response (HCC)    Dysphagia    Hypothyroidism    Anxiety disorder    Body mass index (BMI) of 45 0-49 9 in adult ([facility] )    Cardiomyopathy ([facility] )    Chronic kidney disease    Gastroesophageal reflux disease    Presence of cardiac pacemaker  Resolved Problems:    [facility]acute [facility]) ([facility] )    Chronic combined systolic and diastolic heart failure ([facility] )    History of esophagogastroduodenoscopy (EGD)    Consulting Providers:   Gastroenterology:  Dr Ranjan Valle  HPI:  (as per H&P):  76 y/o male with PMH Anxiety, Arthritis, A-fib, cardiac disease, disease of thyroid gland, dysphagia , GERD, HLD, HTN, is brought to the floor for observation s/p EGD/esophageal stent placement  On initial evaluation, patient was confused, did not know what year it was or why he was getting the procedure  On reevaluation a few minutes later, pt is less confused and answered questions appropriately  Pt states that he was getting the procedure today because he has cancer in his esophagus  Pt denies any pain at this time   Pt denies chest pain, shortness of breath, nausea, abdominal pain  Hospital Course (Problem-Based): Obstructing Esophageal Cancer:  Patient was admitted on 11/17/17  EGD with covered stent was performed  Findings in esophagus and GEJ - newly obstructive esophageal tumor, adenocarcinoma at 34cm, approximately 4cm in length - fully covered esophageal stent, 23 x 120 mm, was advanced with good position  Postoperatively, patient was given Phenergan 1x dose  Patient became increasingly confused  Phenergan was stopped  Patient was placed on liquid diet with close follow-up from [facility] Medicine service and Dr Angela Gavin  Episode of confusion resolved on 11/18/17  Patient is AAO x3 upon discharge      Hypothyroidism: Stable on admission; home medication was held throughout admission    GERD: Stable on admission; patient was continued on Protonix throughout admission    Hypertension: Stable on admission; patient was continued on home medication regimen including lisinopril and metoprolol    Hx of atrial fibrillation: Stable on admission; patient was continued on Xarelto bid with meals      Procedures Performed/Pertinent Test results:    Results for orders placed or performed during the hospital encounter of 94/91/37   Basic metabolic panel   Result Value Ref Range    Sodium 138 136 - 145 mmol/L    Potassium 4 6 3 5 - 5 3 mmol/L    Chloride 104 100 - 108 mmol/L    CO2 26 21 - 32 mmol/L    Anion Gap 8 4 - 13 mmol/L    BUN 17 5 - 25 mg/dL    Creatinine 1 41 (H) 0 60 - 1 30 mg/dL    Glucose 104 65 - 140 mg/dL    Calcium 8 7 8 3 - 10 1 mg/dL    eGFR 48 ml/min/1 73sq m   Magnesium   Result Value Ref Range    Magnesium 1 9 1 6 - 2 6 mg/dL   CBC (With Platelets)   Result Value Ref Range    WBC 8 10 4 80 - 10 80 Thousand/uL    RBC 4 32 (L) 4 70 - 6 10 Million/uL    Hemoglobin 12 8 (L) 14 0 - 18 0 g/dL    Hematocrit 38 7 (L) 42 0 - 52 0 %    MCV 90 82 - 98 fL    MCH 29 7 27 0 - 31 0 pg    MCHC 33 2 31 4 - 37 4 g/dL    RDW 13 4 11 6 - 15 1 %    Platelets 863 130 - 400 Thousands/uL    MPV 7 4 (L) 8 9 - 12 7 fL   Phosphorus   Result Value Ref Range    Phosphorus 2 4 2 3 - 4 1 mg/dL     XR spine cervical 1 view    (Results Pending)     Physical Exam at Discharge:  · See Progress Note from 11/18/17    Medications: Your Medications     Morning Afternoon Evening Bedtime As Needed    amiodarone 200 mg tablet   Take 1 tablet by mouth 2 (two) times a day   Refills: 0                    ASPIRIN LOW DOSE 81 MG tablet   Generic drug: aspirin   Take by mouth   Refills: 0                    furosemide 40 mg tablet   Commonly known as: LASIX   Take by mouth daily   Refills: 0                    levothyroxine 25 mcg tablet   Take 25 mcg by mouth daily   Refills: 0                    lisinopril 5 mg tablet   Commonly known as: ZESTRIL   Take 5 mg by mouth   Refills: 0                    metoprolol succinate 50 mg 24 hr tablet   Commonly known as: TOPROL-XL   Take 50 mg by mouth daily Takes 3 tabs   Refills: 0                    multivitamin tablet   Take 1 tablet by mouth daily   Refills: 0                    Omega-3-Acid Eth Est (Dietary) 1 g Caps   Take by mouth   Refills: 0                    omeprazole 40 MG capsule   Commonly known as: PriLOSEC   Take by mouth daily   Refills: 0                    pravastatin 80 mg tablet   Commonly known as: PRAVACHOL   Take by mouth daily   Refills: 0                    * rivaroxaban 15 mg tablet   Commonly known as: XARELTO   Take 1 tablet by mouth 2 (two) times a day with meals   Refills: 0   What changed: Another medication with the same name was changed   Make sure you understand how and when to take each                     * rivaroxaban 20 mg tablet   Commonly known as: XARELTO   Take 1 tablet by mouth daily with breakfast   Refills: 1   What changed: additional instructions                    sertraline 50 mg tablet   Commonly known as: ZOLOFT   Take 50 mg by mouth daily   Refills: 0                    traZODone 150 mg tablet   Commonly known as: DESYREL   Take 150 mg by mouth daily at bedtime   Refills: 0               Allergies:  No Known Allergies    Diet restrictions: See Attached Documentation  Activity restrictions: none  Code Status: Level 1 - Full Code  Advance Directive and Living Will: <no information>  Power of :    POLST:      Discharge Condition: good      Discharge  Statement:   I spent 40 minutes discharging the patient  This time was spent on the day of discharge  I had direct contact with the patient on the day of discharge  Additional documentation is required if more than 30 minutes were spent on discharge               Cristi Garcia MD

## 2017-11-18 NOTE — PLAN OF CARE
DISCHARGE PLANNING     Discharge to home or other facility with appropriate resources Progressing        DISCHARGE PLANNING - CARE MANAGEMENT     Discharge to post-acute care or home with appropriate resources Progressing        GASTROINTESTINAL - ADULT     Maintains adequate nutritional intake Progressing        INFECTION - ADULT     Absence or prevention of progression during hospitalization Progressing        Knowledge Deficit     Patient/family/caregiver demonstrates understanding of disease process, treatment plan, medications, and discharge instructions Progressing        PAIN - ADULT     Verbalizes/displays adequate comfort level or baseline comfort level Progressing        Prexisting or High Potential for Compromised Skin Integrity     Skin integrity is maintained or improved Progressing

## 2017-11-18 NOTE — PROGRESS NOTES
[facility]  Progress Note - Justin Rios Sr  77 y o  male MRN: [id number]    Unit/Bed#: 2 [place] [address] A Encounter: [id number]    Assessment/Plan:  80-year-old gentleman with nearly obstructing esophageal cancer, atrial fibrillation status post esophageal stent yesterday  1   Esophageal adenocarcinoma:  Status post esophageal stent yesterday, patient feels better today  -discharged home on dissolvable Zofran, b i d  Protonix 40 mg  -patient was given dietary instructions  -full liquid diet today  -pureed diet tomorrow  -thereafter the patient is to avoid meats and raw vegetables  -follow-up with Oncology and Radiation Oncology   -patient is to resume anticoagulation for atrial fibrillation            Subjective:   Patient feels fine, no significant nausea vomiting, denies any significant chest pain  Tolerating clear liquid diet  Objective:     Vitals: Blood pressure 148/67, pulse 70, temperature 99 5 °F (37 5 °C), temperature source Oral, resp  rate 18, height 5' 7" (1 702 m), weight 120 kg (265 lb), SpO2 94 %  ,Body mass index is 41 5 kg/m²        Intake/Output Summary (Last 24 hours) at 11/18/17 0839  Last data filed at 11/18/17 0518   Gross per 24 hour   Intake              100 ml   Output              650 ml   Net             -550 ml       Physical Exam:    GEN:  Obese, wn/wd, NAD  HEENT: MMM, no cervical or supraclavicular LAD, anciteric  CV: RRR, no m/r/g  CHEST: CTA b/l, no w/r/r  ABD: +BS, soft, NT/ND, no hepatosplenomegaly  EXT: no c/c/e  SKIN: no rashes  NEURO: aaox3      Invasive Devices     Peripheral Intravenous Line            Peripheral IV 11/17/17 less than 1 day                        Lab, Imaging and other studies:     Admission on 11/17/2017   Component Date Value    Sodium 11/18/2017 138     Potassium 11/18/2017 4 6     Chloride 11/18/2017 104     CO2 11/18/2017 26     Anion Gap 11/18/2017 8     BUN 11/18/2017 17     Creatinine 11/18/2017 1 41*    Glucose 11/18/2017 104     Calcium 11/18/2017 8 7     eGFR 11/18/2017 48     Magnesium 11/18/2017 1 9     WBC 11/18/2017 8 10     RBC 11/18/2017 4 32*    Hemoglobin 11/18/2017 12 8*    Hematocrit 11/18/2017 38 7*    MCV 11/18/2017 90     MCH 11/18/2017 29 7     MCHC 11/18/2017 33 2     RDW 11/18/2017 13 4     Platelets 96/63/6657 226     MPV 11/18/2017 7 4*    Phosphorus 11/18/2017 2 4          I have personally reviewed pertinent reports        Current Facility-Administered Medications   Medication Dose Route Frequency    lisinopril (ZESTRIL) tablet 5 mg  5 mg Oral Daily    metoprolol succinate (TOPROL-XL) 24 hr tablet 50 mg  50 mg Oral Daily    morphine injection 2 mg  2 mg Intravenous Q4H PRN    ondansetron (ZOFRAN) injection 4 mg  4 mg Intravenous Q6H PRN    pantoprazole (PROTONIX) injection 40 mg  40 mg Intravenous Q8H    rivaroxaban (XARELTO) tablet 15 mg  15 mg Oral BID With Meals    sodium chloride 0 9 % infusion  70 mL/hr Intravenous Continuous

## 2017-11-19 LAB — MRSA NOSE QL CULT: NORMAL

## 2017-11-19 NOTE — NURSING NOTE
Patient discharged to home  All discharge instructions and AVS reviewed with pt and his daughter  Opportunity for questions provided  They were instructed to make an appointment nicholas STRONG in 1 week  Also, to f/u with their future [facility] appointments that are scheduled  Pt left floor via wheelchair to [place]

## 2017-11-24 ENCOUNTER — ALLSCRIPTS OFFICE VISIT (OUTPATIENT)
Dept: OTHER | Facility: OTHER | Age: 77
End: 2017-11-24

## 2017-11-25 ENCOUNTER — HOSPITAL ENCOUNTER (OUTPATIENT)
Facility: HOSPITAL | Age: 77
Setting detail: OBSERVATION
Discharge: HOME | End: 2017-11-29
Attending: EMERGENCY MEDICINE | Admitting: FAMILY MEDICINE
Payer: COMMERCIAL

## 2017-11-25 ENCOUNTER — APPOINTMENT (EMERGENCY)
Dept: RADIOLOGY | Facility: HOSPITAL | Age: 77
End: 2017-11-25
Payer: COMMERCIAL

## 2017-11-25 DIAGNOSIS — I48.92 ATRIAL FLUTTER WITH RAPID VENTRICULAR RESPONSE (HCC): ICD-10-CM

## 2017-11-25 DIAGNOSIS — R07.9 CHEST PAIN: ICD-10-CM

## 2017-11-25 DIAGNOSIS — N18.9 ACUTE ON CHRONIC RENAL FAILURE (HCC): ICD-10-CM

## 2017-11-25 DIAGNOSIS — N17.9 ACUTE ON CHRONIC RENAL FAILURE (HCC): ICD-10-CM

## 2017-11-25 DIAGNOSIS — R00.0 TACHYCARDIA: Primary | ICD-10-CM

## 2017-11-25 LAB
ALBUMIN SERPL BCP-MCNC: 2.9 G/DL (ref 3.5–5)
ALP SERPL-CCNC: 54 U/L (ref 46–116)
ALT SERPL W P-5'-P-CCNC: 23 U/L (ref 12–78)
ANION GAP SERPL CALCULATED.3IONS-SCNC: 11 MMOL/L (ref 4–13)
APTT PPP: 27 SECONDS (ref 24–33)
AST SERPL W P-5'-P-CCNC: 36 U/L (ref 5–45)
BASOPHILS # BLD AUTO: 0.1 THOUSANDS/ΜL (ref 0–0.1)
BASOPHILS NFR BLD AUTO: 1 % (ref 0–1)
BILIRUB SERPL-MCNC: 0.5 MG/DL (ref 0.2–1)
BUN SERPL-MCNC: 27 MG/DL (ref 5–25)
CALCIUM SERPL-MCNC: 10.3 MG/DL (ref 8.3–10.1)
CHLORIDE SERPL-SCNC: 99 MMOL/L (ref 100–108)
CO2 SERPL-SCNC: 26 MMOL/L (ref 21–32)
CREAT SERPL-MCNC: 1.7 MG/DL (ref 0.6–1.3)
EOSINOPHIL # BLD AUTO: 0.3 THOUSAND/ΜL (ref 0–0.61)
EOSINOPHIL NFR BLD AUTO: 4 % (ref 0–6)
ERYTHROCYTE [DISTWIDTH] IN BLOOD BY AUTOMATED COUNT: 12.9 % (ref 11.6–15.1)
GFR SERPL CREATININE-BSD FRML MDRD: 39 ML/MIN/1.73SQ M
GLUCOSE SERPL-MCNC: 114 MG/DL (ref 65–140)
HCT VFR BLD AUTO: 38.7 % (ref 42–52)
HGB BLD-MCNC: 12.4 G/DL (ref 14–18)
INR PPP: 0.97 (ref 0.86–1.16)
LYMPHOCYTES # BLD AUTO: 1.8 THOUSANDS/ΜL (ref 0.6–4.47)
LYMPHOCYTES NFR BLD AUTO: 20 % (ref 14–44)
MAGNESIUM SERPL-MCNC: 2.1 MG/DL (ref 1.6–2.6)
MCH RBC QN AUTO: 28.4 PG (ref 27–31)
MCHC RBC AUTO-ENTMCNC: 32 G/DL (ref 31.4–37.4)
MCV RBC AUTO: 89 FL (ref 82–98)
MONOCYTES # BLD AUTO: 0.5 THOUSAND/ΜL (ref 0.17–1.22)
MONOCYTES NFR BLD AUTO: 5 % (ref 4–12)
NEUTROPHILS # BLD AUTO: 6.3 THOUSANDS/ΜL (ref 1.85–7.62)
NEUTS SEG NFR BLD AUTO: 70 % (ref 43–75)
PLATELET # BLD AUTO: 325 THOUSANDS/UL (ref 130–400)
PMV BLD AUTO: 8.2 FL (ref 8.9–12.7)
POTASSIUM SERPL-SCNC: 4.3 MMOL/L (ref 3.5–5.3)
PROT SERPL-MCNC: 8.1 G/DL (ref 6.4–8.2)
PROTHROMBIN TIME: 10.2 SECONDS (ref 9.4–11.7)
RBC # BLD AUTO: 4.36 MILLION/UL (ref 4.7–6.1)
SODIUM SERPL-SCNC: 136 MMOL/L (ref 136–145)
TROPONIN I SERPL-MCNC: <0.02 NG/ML
TSH SERPL DL<=0.05 MIU/L-ACNC: 7.18 UIU/ML (ref 0.36–3.74)
WBC # BLD AUTO: 9 THOUSAND/UL (ref 4.8–10.8)

## 2017-11-25 PROCEDURE — 85025 COMPLETE CBC W/AUTO DIFF WBC: CPT | Performed by: EMERGENCY MEDICINE

## 2017-11-25 PROCEDURE — 36415 COLL VENOUS BLD VENIPUNCTURE: CPT | Performed by: EMERGENCY MEDICINE

## 2017-11-25 PROCEDURE — 80053 COMPREHEN METABOLIC PANEL: CPT | Performed by: EMERGENCY MEDICINE

## 2017-11-25 PROCEDURE — 87081 CULTURE SCREEN ONLY: CPT | Performed by: FAMILY MEDICINE

## 2017-11-25 PROCEDURE — 85610 PROTHROMBIN TIME: CPT | Performed by: EMERGENCY MEDICINE

## 2017-11-25 PROCEDURE — 96374 THER/PROPH/DIAG INJ IV PUSH: CPT

## 2017-11-25 PROCEDURE — 85730 THROMBOPLASTIN TIME PARTIAL: CPT | Performed by: EMERGENCY MEDICINE

## 2017-11-25 PROCEDURE — 83735 ASSAY OF MAGNESIUM: CPT | Performed by: STUDENT IN AN ORGANIZED HEALTH CARE EDUCATION/TRAINING PROGRAM

## 2017-11-25 PROCEDURE — 93005 ELECTROCARDIOGRAM TRACING: CPT

## 2017-11-25 PROCEDURE — 99285 EMERGENCY DEPT VISIT HI MDM: CPT

## 2017-11-25 PROCEDURE — 84443 ASSAY THYROID STIM HORMONE: CPT | Performed by: STUDENT IN AN ORGANIZED HEALTH CARE EDUCATION/TRAINING PROGRAM

## 2017-11-25 PROCEDURE — 71010 HB CHEST X-RAY 1 VIEW FRONTAL (PORTABLE): CPT

## 2017-11-25 PROCEDURE — 84484 ASSAY OF TROPONIN QUANT: CPT | Performed by: EMERGENCY MEDICINE

## 2017-11-25 PROCEDURE — 93005 ELECTROCARDIOGRAM TRACING: CPT | Performed by: EMERGENCY MEDICINE

## 2017-11-25 PROCEDURE — 96361 HYDRATE IV INFUSION ADD-ON: CPT

## 2017-11-25 RX ORDER — LISINOPRIL 5 MG/1
5 TABLET ORAL DAILY
Status: DISCONTINUED | OUTPATIENT
Start: 2017-11-26 | End: 2017-11-29 | Stop reason: HOSPADM

## 2017-11-25 RX ORDER — PRAVASTATIN SODIUM 80 MG/1
80 TABLET ORAL DAILY
Status: DISCONTINUED | OUTPATIENT
Start: 2017-11-25 | End: 2017-11-29 | Stop reason: HOSPADM

## 2017-11-25 RX ORDER — METOPROLOL TARTRATE 5 MG/5ML
5 INJECTION INTRAVENOUS ONCE
Status: COMPLETED | OUTPATIENT
Start: 2017-11-25 | End: 2017-11-25

## 2017-11-25 RX ORDER — DEXTROSE, SODIUM CHLORIDE, AND POTASSIUM CHLORIDE 5; .45; .075 G/100ML; G/100ML; G/100ML
150 INJECTION INTRAVENOUS CONTINUOUS
Status: DISCONTINUED | OUTPATIENT
Start: 2017-11-25 | End: 2017-11-26

## 2017-11-25 RX ORDER — NITROGLYCERIN 0.4 MG/1
0.4 TABLET SUBLINGUAL
Status: DISCONTINUED | OUTPATIENT
Start: 2017-11-25 | End: 2017-11-29 | Stop reason: HOSPADM

## 2017-11-25 RX ORDER — AMIODARONE HYDROCHLORIDE 200 MG/1
200 TABLET ORAL 2 TIMES DAILY
Status: DISCONTINUED | OUTPATIENT
Start: 2017-11-26 | End: 2017-11-29 | Stop reason: HOSPADM

## 2017-11-25 RX ORDER — SUCRALFATE ORAL 1 G/10ML
1000 SUSPENSION ORAL
Status: DISCONTINUED | OUTPATIENT
Start: 2017-11-25 | End: 2017-11-29 | Stop reason: HOSPADM

## 2017-11-25 RX ORDER — TRAZODONE HYDROCHLORIDE 50 MG/1
150 TABLET ORAL
Status: DISCONTINUED | OUTPATIENT
Start: 2017-11-25 | End: 2017-11-29 | Stop reason: HOSPADM

## 2017-11-25 RX ORDER — FUROSEMIDE 40 MG/1
40 TABLET ORAL DAILY
Status: DISCONTINUED | OUTPATIENT
Start: 2017-11-26 | End: 2017-11-29 | Stop reason: HOSPADM

## 2017-11-25 RX ORDER — ASPIRIN 81 MG/1
81 TABLET, CHEWABLE ORAL DAILY
Status: DISCONTINUED | OUTPATIENT
Start: 2017-11-26 | End: 2017-11-29 | Stop reason: HOSPADM

## 2017-11-25 RX ORDER — PANTOPRAZOLE SODIUM 40 MG/1
40 TABLET, DELAYED RELEASE ORAL
Status: DISCONTINUED | OUTPATIENT
Start: 2017-11-26 | End: 2017-11-29 | Stop reason: HOSPADM

## 2017-11-25 RX ORDER — METOPROLOL SUCCINATE 50 MG/1
50 TABLET, EXTENDED RELEASE ORAL DAILY
Status: DISCONTINUED | OUTPATIENT
Start: 2017-11-26 | End: 2017-11-29 | Stop reason: HOSPADM

## 2017-11-25 RX ORDER — LEVOTHYROXINE SODIUM 0.03 MG/1
25 TABLET ORAL
Status: DISCONTINUED | OUTPATIENT
Start: 2017-11-26 | End: 2017-11-29 | Stop reason: HOSPADM

## 2017-11-25 RX ORDER — METOPROLOL TARTRATE 50 MG/1
50 TABLET, FILM COATED ORAL ONCE
Status: COMPLETED | OUTPATIENT
Start: 2017-11-25 | End: 2017-11-25

## 2017-11-25 RX ORDER — SODIUM CHLORIDE 9 MG/ML
75 INJECTION, SOLUTION INTRAVENOUS CONTINUOUS
Status: DISCONTINUED | OUTPATIENT
Start: 2017-11-25 | End: 2017-11-25

## 2017-11-25 RX ORDER — AMIODARONE HYDROCHLORIDE 200 MG/1
200 TABLET ORAL ONCE
Status: COMPLETED | OUTPATIENT
Start: 2017-11-25 | End: 2017-11-25

## 2017-11-25 RX ORDER — ACETAMINOPHEN 325 MG/1
650 TABLET ORAL EVERY 6 HOURS PRN
Status: DISCONTINUED | OUTPATIENT
Start: 2017-11-25 | End: 2017-11-29 | Stop reason: HOSPADM

## 2017-11-25 RX ADMIN — SODIUM CHLORIDE 125 ML/HR: 0.9 INJECTION, SOLUTION INTRAVENOUS at 19:22

## 2017-11-25 RX ADMIN — PRAVASTATIN SODIUM 80 MG: 80 TABLET ORAL at 22:49

## 2017-11-25 RX ADMIN — TRAZODONE HYDROCHLORIDE 150 MG: 50 TABLET ORAL at 22:50

## 2017-11-25 RX ADMIN — DEXTROSE, SODIUM CHLORIDE, AND POTASSIUM CHLORIDE 150 ML/HR: 5; .45; .075 INJECTION INTRAVENOUS at 22:50

## 2017-11-25 RX ADMIN — AMIODARONE HYDROCHLORIDE 200 MG: 200 TABLET ORAL at 19:59

## 2017-11-25 RX ADMIN — METOPROLOL TARTRATE 50 MG: 50 TABLET ORAL at 19:59

## 2017-11-25 RX ADMIN — SUCRALFATE 1000 MG: 1 SUSPENSION ORAL at 22:50

## 2017-11-25 RX ADMIN — METOPROLOL TARTRATE 5 MG: 5 INJECTION, SOLUTION INTRAVENOUS at 19:40

## 2017-11-25 RX ADMIN — METOPROLOL TARTRATE 5 MG: 5 INJECTION, SOLUTION INTRAVENOUS at 19:22

## 2017-11-26 LAB
ANION GAP SERPL CALCULATED.3IONS-SCNC: 9 MMOL/L (ref 4–13)
BUN SERPL-MCNC: 27 MG/DL (ref 5–25)
CALCIUM SERPL-MCNC: 9.2 MG/DL (ref 8.3–10.1)
CHLORIDE SERPL-SCNC: 100 MMOL/L (ref 100–108)
CHOLEST SERPL-MCNC: 196 MG/DL (ref 50–200)
CO2 SERPL-SCNC: 27 MMOL/L (ref 21–32)
CREAT SERPL-MCNC: 1.53 MG/DL (ref 0.6–1.3)
ERYTHROCYTE [DISTWIDTH] IN BLOOD BY AUTOMATED COUNT: 12.9 % (ref 11.6–15.1)
EST. AVERAGE GLUCOSE BLD GHB EST-MCNC: 105 MG/DL
GFR SERPL CREATININE-BSD FRML MDRD: 44 ML/MIN/1.73SQ M
GLUCOSE P FAST SERPL-MCNC: 117 MG/DL (ref 65–99)
GLUCOSE SERPL-MCNC: 117 MG/DL (ref 65–140)
HBA1C MFR BLD: 5.3 % (ref 4.2–6.3)
HCT VFR BLD AUTO: 33.1 % (ref 42–52)
HDLC SERPL-MCNC: 32 MG/DL (ref 40–60)
HGB BLD-MCNC: 11.2 G/DL (ref 14–18)
LDLC SERPL CALC-MCNC: 137 MG/DL (ref 0–100)
MAGNESIUM SERPL-MCNC: 1.9 MG/DL (ref 1.6–2.6)
MCH RBC QN AUTO: 29.9 PG (ref 27–31)
MCHC RBC AUTO-ENTMCNC: 33.7 G/DL (ref 31.4–37.4)
MCV RBC AUTO: 89 FL (ref 82–98)
PHOSPHATE SERPL-MCNC: 2.7 MG/DL (ref 2.3–4.1)
PLATELET # BLD AUTO: 291 THOUSANDS/UL (ref 130–400)
PMV BLD AUTO: 7.9 FL (ref 8.9–12.7)
POTASSIUM SERPL-SCNC: 3.8 MMOL/L (ref 3.5–5.3)
RBC # BLD AUTO: 3.74 MILLION/UL (ref 4.7–6.1)
SODIUM SERPL-SCNC: 136 MMOL/L (ref 136–145)
TRIGL SERPL-MCNC: 137 MG/DL
TROPONIN I SERPL-MCNC: <0.02 NG/ML
TROPONIN I SERPL-MCNC: <0.02 NG/ML
WBC # BLD AUTO: 8.5 THOUSAND/UL (ref 4.8–10.8)

## 2017-11-26 PROCEDURE — 80061 LIPID PANEL: CPT | Performed by: STUDENT IN AN ORGANIZED HEALTH CARE EDUCATION/TRAINING PROGRAM

## 2017-11-26 PROCEDURE — 84484 ASSAY OF TROPONIN QUANT: CPT | Performed by: STUDENT IN AN ORGANIZED HEALTH CARE EDUCATION/TRAINING PROGRAM

## 2017-11-26 PROCEDURE — 80048 BASIC METABOLIC PNL TOTAL CA: CPT | Performed by: STUDENT IN AN ORGANIZED HEALTH CARE EDUCATION/TRAINING PROGRAM

## 2017-11-26 PROCEDURE — 84100 ASSAY OF PHOSPHORUS: CPT | Performed by: STUDENT IN AN ORGANIZED HEALTH CARE EDUCATION/TRAINING PROGRAM

## 2017-11-26 PROCEDURE — 83036 HEMOGLOBIN GLYCOSYLATED A1C: CPT | Performed by: STUDENT IN AN ORGANIZED HEALTH CARE EDUCATION/TRAINING PROGRAM

## 2017-11-26 PROCEDURE — 85027 COMPLETE CBC AUTOMATED: CPT | Performed by: STUDENT IN AN ORGANIZED HEALTH CARE EDUCATION/TRAINING PROGRAM

## 2017-11-26 PROCEDURE — 93005 ELECTROCARDIOGRAM TRACING: CPT | Performed by: STUDENT IN AN ORGANIZED HEALTH CARE EDUCATION/TRAINING PROGRAM

## 2017-11-26 PROCEDURE — 83735 ASSAY OF MAGNESIUM: CPT | Performed by: STUDENT IN AN ORGANIZED HEALTH CARE EDUCATION/TRAINING PROGRAM

## 2017-11-26 PROCEDURE — 93005 ELECTROCARDIOGRAM TRACING: CPT

## 2017-11-26 RX ORDER — METOPROLOL TARTRATE 5 MG/5ML
5 INJECTION INTRAVENOUS ONCE AS NEEDED
Status: DISCONTINUED | OUTPATIENT
Start: 2017-11-26 | End: 2017-11-29 | Stop reason: HOSPADM

## 2017-11-26 RX ADMIN — SERTRALINE HYDROCHLORIDE 50 MG: 50 TABLET ORAL at 08:42

## 2017-11-26 RX ADMIN — SUCRALFATE 1000 MG: 1 SUSPENSION ORAL at 16:55

## 2017-11-26 RX ADMIN — SUCRALFATE 1000 MG: 1 SUSPENSION ORAL at 21:37

## 2017-11-26 RX ADMIN — SUCRALFATE 1000 MG: 1 SUSPENSION ORAL at 06:16

## 2017-11-26 RX ADMIN — PANTOPRAZOLE SODIUM 40 MG: 40 TABLET, DELAYED RELEASE ORAL at 05:33

## 2017-11-26 RX ADMIN — SUCRALFATE 1000 MG: 1 SUSPENSION ORAL at 11:48

## 2017-11-26 RX ADMIN — TRAZODONE HYDROCHLORIDE 150 MG: 50 TABLET ORAL at 21:37

## 2017-11-26 RX ADMIN — PRAVASTATIN SODIUM 80 MG: 80 TABLET ORAL at 08:41

## 2017-11-26 RX ADMIN — ASPIRIN 81 MG 81 MG: 81 TABLET ORAL at 08:42

## 2017-11-26 RX ADMIN — LEVOTHYROXINE SODIUM 25 MCG: 25 TABLET ORAL at 05:33

## 2017-11-26 RX ADMIN — ACETAMINOPHEN 650 MG: 325 TABLET, FILM COATED ORAL at 00:00

## 2017-11-26 RX ADMIN — RIVAROXABAN 20 MG: 10 TABLET, FILM COATED ORAL at 08:41

## 2017-11-26 RX ADMIN — AMIODARONE HYDROCHLORIDE 200 MG: 200 TABLET ORAL at 08:41

## 2017-11-26 RX ADMIN — AMIODARONE HYDROCHLORIDE 200 MG: 200 TABLET ORAL at 18:24

## 2017-11-26 RX ADMIN — ACETAMINOPHEN 650 MG: 325 TABLET, FILM COATED ORAL at 08:37

## 2017-11-26 RX ADMIN — LISINOPRIL 5 MG: 5 TABLET ORAL at 08:41

## 2017-11-26 RX ADMIN — METOPROLOL SUCCINATE 50 MG: 50 TABLET, FILM COATED, EXTENDED RELEASE ORAL at 08:42

## 2017-11-26 RX ADMIN — FUROSEMIDE 40 MG: 40 TABLET ORAL at 08:42

## 2017-11-26 NOTE — PLAN OF CARE
CARDIOVASCULAR - ADULT     Maintains optimal cardiac output and hemodynamic stability Progressing     Absence of cardiac dysrhythmias or at baseline rhythm Progressing        DISCHARGE PLANNING     Discharge to home or other facility with appropriate resources Progressing        GASTROINTESTINAL - ADULT     Minimal or absence of nausea and/or vomiting Progressing        GENITOURINARY - ADULT     Maintains or returns to baseline urinary function Progressing        INFECTION - ADULT     Absence or prevention of progression during hospitalization Progressing        Knowledge Deficit     Patient/family/caregiver demonstrates understanding of disease process, treatment plan, medications, and discharge instructions Progressing        Nutrition/Hydration-ADULT     Nutrient/Hydration intake appropriate for improving, restoring or maintaining nutritional needs Progressing        PAIN - ADULT     Verbalizes/displays adequate comfort level or baseline comfort level Progressing        SAFETY ADULT     Patient will remain free of falls Progressing     Maintain or return to baseline ADL function Progressing     Maintain or return mobility status to optimal level Progressing

## 2017-11-26 NOTE — H&P
H&P Exam - Eva West  77 y o  male MRN: [id number]    Unit/Bed#: ED CT2 Encounter: [id number]      Assessment/Plan     Assessment:  76 yo male w/ PMH of PMH of afib on xarelto, CHF s/p ICD interrogation, CAD, HTN, HLD, esophageal cancer s/p esophageal stent placement presenting with chest pain who is being admitted under the service of Dr Darrell Lentz and is expected to stay for 23 hour observation  Patient will be discharged home once stable      Plan:  Chest pain r/o ACS  -trop x1 negative, trend x2  -per Er, pt arrived in A-flutter, given IV lopressor 5mg x2, lopressor 50mg PO x1, amiodarone 200mg PO x1  -EKG showed, repeat EKG in AM  -nitrostat 0 4mg q5min PRN  -cont aspirin 81mg  -Nasal cannula to maintain O2 sat >92%  -telemetry monitoring  -repeat AM labs, keeps K>4, Mg>2  -consult cardiology    Hx of A-fib  -episode of A flutter in Er, s/p lopressor 50mg Po, IV lopressor 5mg X2, amiodarone 200mg PO  -cont xarelto 15mg BID and amiodarone 200mg BID  -telemetry monitoring    CHF s/p ICD w/ normalization of EF  -stable, follows up with Dr Sonal Fletcher  -cont metoprolol 50mg daily  -cont lisinopril 5mg daily  -cont furosemide 40mg BID  -strict I/Os  -daily weights    CAD  -stable  -cont aspirin 81mg daily    HTN  -stable  -cont lisinopril 5mg daily, metoprolol 50mg daily    CKD Stage 3  -stable  -baseline Cr 1 5-1 8  -cont daily BMP    Esophageal cancer s/p esophageal stent placement  -stable  -follows w/ Dr Markus Perez  -cont protonix 40mg BID  -carafate 10ml qac qhs  -zofran 4mg q6hr PRN    Hypothyroidism  -stable  -TSH pending  -cont levothyroxine 25mcg daily    HLD  -stable  -lipid panel pending  -cont pravastatin 80mg daily    Anixety  -stable  -cont zoloft 50mg daily    Insomnia  -stable  -cont trazadone 150mg qhs    FEN  -IV D51/2NS w/ KCL 10 @ 150cc/hr  -replete electrolytes as needed  -low sodium diet  -xarelto and SCDs for DVT prophylaxis        History of Present Illness     HPI:  Eva West  is a 77 y o  male w/ PMH of afib on xarelto, CHF s/p ICD interrogation, CAD, HTN, HLD, esophageal cancer s/p esophageal stent placement who presents to the ER with chest pain  Patient states that since this morning he has been having a burning sensation which is mostly like his indigestion and heartburn that he always has  Patient states he has not been feeling well had 1 episode of nonbloody non bilious emesis in the morning  He has not taken any of his medications today  The chest burning sensation began to worsen so he decided to come into the ER for further evaluation  Denies dizziness, palpitations, diaphoresis, chest pain, shortness of breath  In the Er, EKG was done and was noted to be Wide complex QRS tachycardia with possible underlying A- flutter  Dr Miracle Bishop was called and recommended giving IV Lopressor 5 mg x2, Lopressor 50 mg p o  x1, amiodarone 200 mg p o  x1   Rhythm became paced at a rate of 69  Labs were drawn and chest x-ray was done  Review of Systems   Constitutional: Positive for appetite change  Negative for diaphoresis and fever  HENT: Negative  Eyes: Negative  Respiratory: Negative  Cardiovascular: Negative for chest pain and palpitations  Gastrointestinal:        Epigastric burning sensation   Genitourinary: Negative  Musculoskeletal: Negative  Neurological: Negative  Psychiatric/Behavioral: Negative          Historical Information   Past Medical History:   Diagnosis Date    Anxiety     Arthritis     Atrial fibrillation ([address] )     h/o atrial fib/ flutter    Cardiac disease     Disease of thyroid gland     Dysphagia     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    GERD (gastroesophageal reflux disease)     Hernia, abdominal     chronic umbilical    Hyperlipidemia     Hypertension      Past Surgical History:   Procedure Laterality Date    CARDIAC PACEMAKER PLACEMENT      CARDIAC PACEMAKER PLACEMENT Left     placed 25 years ago    ESOPHAGOGASTRODUODENOSCOPY N/A 10/31/2017    Procedure: ESOPHAGOGASTRODUODENOSCOPY (EGD); Surgeon: Suzie Rodríguez MD;  Location: [facility]; Service: Gastroenterology    ESOPHAGOGASTRODUODENOSCOPY N/A 11/17/2017    Procedure: ESOPHAGOGASTRODUODENOSCOPY (EGD) WITH STENT;  Surgeon: Suzie Rodríguez MD;  Location: [facility];  Service: Gastroenterology    FRACTURE SURGERY      LEG SURGERY      TIBIA FRACTURE SURGERY      orif left leg 1986     Social History   History   Alcohol Use No     History   Drug Use No     History   Smoking Status    Former Smoker    Packs/day: 1 00    Types: Cigarettes    Quit date: 11/16/1997   Smokeless Tobacco    Never Used     Family History: History reviewed  No pertinent family history  Meds/Allergies   PTA meds:   Prior to Admission Medications   Prescriptions Last Dose Informant Patient Reported? Taking?    Multiple Vitamin (MULTIVITAMIN) tablet 11/24/2017 at Unknown time  Yes Yes   Sig: Take 1 tablet by mouth daily   Omega-3-Acid Eth Est, Dietary, 1 g CAPS 11/24/2017 at Unknown time  Yes Yes   Sig: Take by mouth   amiodarone 200 mg tablet 11/24/2017 at Unknown time  No Yes   Sig: Take 1 tablet by mouth 2 (two) times a day   aspirin (ASPIRIN LOW DOSE) 81 MG tablet 11/24/2017 at Unknown time  Yes Yes   Sig: Take by mouth   furosemide (LASIX) 40 mg tablet 11/24/2017 at Unknown time  Yes Yes   Sig: Take by mouth daily     levothyroxine 25 mcg tablet 11/24/2017 at Unknown time  Yes Yes   Sig: Take 25 mcg by mouth daily     lisinopril (ZESTRIL) 5 mg tablet 11/24/2017 at Unknown time  Yes Yes   Sig: Take 5 mg by mouth     metoprolol succinate (TOPROL-XL) 50 mg 24 hr tablet 11/24/2017 at Unknown time  Yes Yes   Sig: Take 50 mg by mouth daily Takes 3 tabs    omeprazole (PriLOSEC) 40 MG capsule 11/24/2017 at Unknown time  Yes Yes   Sig: Take by mouth daily     pravastatin (PRAVACHOL) 80 mg tablet 11/24/2017 at Unknown time  Yes Yes   Sig: Take by mouth daily     rivaroxaban (Jamel Mas) 20 mg tablet 11/24/2017 at Unknown time  No Yes   Sig: Take 1 tablet by mouth daily with breakfast   Patient taking differently: Take 20 mg by mouth daily with breakfast Last dose end of oct  sertraline (ZOLOFT) 50 mg tablet 11/24/2017 at Unknown time  Yes Yes   Sig: Take 50 mg by mouth daily     traZODone (DESYREL) 150 mg tablet 11/24/2017 at Unknown time  Yes Yes   Sig: Take 150 mg by mouth daily at bedtime        Facility-Administered Medications: None     No Known Allergies    Objective   Vitals: Blood pressure 145/72, pulse 75, temperature 97 6 °F (36 4 °C), temperature source Tympanic, resp  rate 18, height 5' 7" (1 702 m), weight 120 kg (265 lb), SpO2 98 %  No intake or output data in the 24 hours ending 11/25/17 2022    Invasive Devices     Peripheral Intravenous Line            Peripheral IV 11/25/17 Left Hand less than 1 day                Physical Exam   Constitutional: He is oriented to person, place, and time  He appears well-developed and well-nourished  No distress  HENT:   Head: Normocephalic and atraumatic  Eyes: Pupils are equal, round, and reactive to light  Neck: Normal range of motion  Cardiovascular: Normal rate  A flutter, placed   Pulmonary/Chest: Effort normal and breath sounds normal  No respiratory distress  He has no wheezes  Abdominal: Soft  Bowel sounds are normal    Neurological: He is alert and oriented to person, place, and time  Skin: Skin is warm and dry  Psychiatric: He has a normal mood and affect  Lab Results: I have personally reviewed pertinent reports       Results for orders placed or performed during the hospital encounter of 11/25/17   Comprehensive metabolic panel   Result Value Ref Range    Sodium 136 136 - 145 mmol/L    Potassium 4 3 3 5 - 5 3 mmol/L    Chloride 99 (L) 100 - 108 mmol/L    CO2 26 21 - 32 mmol/L    Anion Gap 11 4 - 13 mmol/L    BUN 27 (H) 5 - 25 mg/dL    Creatinine 1 70 (H) 0 60 - 1 30 mg/dL    Glucose 114 65 - 140 mg/dL Calcium 10 3 (H) 8 3 - 10 1 mg/dL    AST 36 5 - 45 U/L    ALT 23 12 - 78 U/L    Alkaline Phosphatase 54 46 - 116 U/L    Total Protein 8 1 6 4 - 8 2 g/dL    Albumin 2 9 (L) 3 5 - 5 0 g/dL    Total Bilirubin 0 50 0 20 - 1 00 mg/dL    eGFR 39 ml/min/1 73sq m   CBC and differential   Result Value Ref Range    WBC 9 00 4 80 - 10 80 Thousand/uL    RBC 4 36 (L) 4 70 - 6 10 Million/uL    Hemoglobin 12 4 (L) 14 0 - 18 0 g/dL    Hematocrit 38 7 (L) 42 0 - 52 0 %    MCV 89 82 - 98 fL    MCH 28 4 27 0 - 31 0 pg    MCHC 32 0 31 4 - 37 4 g/dL    RDW 12 9 11 6 - 15 1 %    MPV 8 2 (L) 8 9 - 12 7 fL    Platelets 929 463 - 452 Thousands/uL    Neutrophils Relative 70 43 - 75 %    Lymphocytes Relative 20 14 - 44 %    Monocytes Relative 5 4 - 12 %    Eosinophils Relative 4 0 - 6 %    Basophils Relative 1 0 - 1 %    Neutrophils Absolute 6 30 1 85 - 7 62 Thousands/µL    Lymphocytes Absolute 1 80 0 60 - 4 47 Thousands/µL    Monocytes Absolute 0 50 0 17 - 1 22 Thousand/µL    Eosinophils Absolute 0 30 0 00 - 0 61 Thousand/µL    Basophils Absolute 0 10 0 00 - 0 10 Thousands/µL   Protime-INR   Result Value Ref Range    Protime 10 2 9 4 - 11 7 seconds    INR 0 97 0 86 - 1 16   APTT   Result Value Ref Range    PTT 27 24 - 33 seconds   Troponin I   Result Value Ref Range    Troponin I <0 02 <=0 04 ng/mL     Imaging: I have personally reviewed pertinent reports  XR chest 1 view portable   ED Interpretation   CM        EKG, Pathology, and Other Studies: I have personally reviewed pertinent reports  Code Status: Prior  Advance Directive and Living Will:      Power of :    POLST:      Counseling / Coordination of Care  Total floor / unit time spent today 40 minutes  Greater than 50% of total time was spent with the patient and / or family counseling and / or coordination of care

## 2017-11-26 NOTE — SOCIAL WORK
DASH discussion completed  Discussed goals of making sure pt's needs are met upon discharge, pt's preferences are taken into account, pt understands her health condition, medications and symptoms to watch for after returning home and pt is aware of any follow up appointments recommended by hospital physician  SPOKE WITH THE PT AT THE BEDSIDE  PT DOES LIVE WITH HIS GRANDSON AND HIS MOM, NOTED THAT HE HAS A WALKER AND NO HHC AT THIS TIME   PT DOES DRIVE AND USES THE [facility] IN [place], [place]

## 2017-11-26 NOTE — PLAN OF CARE
Problem: DISCHARGE PLANNING - CARE MANAGEMENT  Goal: Discharge to post-acute care or home with appropriate resources  INTERVENTIONS:  - Conduct assessment to determine patient/family and health care team treatment goals, and need for post-acute services based on payer coverage, community resources, and patient preferences, and barriers to discharge  - Address psychosocial, clinical, and financial barriers to discharge as identified in assessment in conjunction with the patient/family and health care team  - Arrange appropriate level of post-acute services according to patient's   needs and preference and payer coverage in collaboration with the physician and health care team  - Communicate with and update the patient/family, physician, and health care team regarding progress on the discharge plan  - Arrange appropriate transportation to post-acute venues  Formerly [facility], [id number] [facility]  Outcome: Progressing

## 2017-11-26 NOTE — NUTRITION
11/26/17 0929   Assessment   Timepoint Initial  (BMI>40)   Labs   List Completed Labs (BUN 27, creat 1 53, , 117, alb 2 9, HDL 32, ; meds: D5NS, furosemide, levothyroxine, lisinopril, metoprolol, pravastatin, sucralfate)   Adequacy of Intake   Nutrition Modality PO  (Cardiac TLC 2 3g Na, 1800ml FR)   Estimated calorie intake compared to estimated need Pt off unit at visit, unable to gather intake history  Will monitor  Nutrition Prognosis   Potential Fair   Nutrition Concerns (morbid obesity, Chest pain r/o ACS, CKD3)   Comorbid Concerns (Esophageal cancer s/p esophageal stent placement)   Nutrition Considerations (education inappropriate at present, will provide as needed)   PES Statement   Problem Clinical   Weight (3) Overweight/obesity NC-3 3   Overweight/ obesity specific to Obese, Class III (5)   Related to Energy intake > Energy output over time   As evidenced by: BMI   Patient Nutrition Goals   Goal avoid weight gain;meet PO needs   Goal Status initiated   Timeframe to complete goal by next f/u   Recommendations/Interventions   Summary BMI form filed  Per H&P, Chest pain r/o ACS, esophageal CA s/p stent placement-follows w/Dr Tatiana Lugo  Off floor for repeat EKG  WIll monitor po intakes and provide diet education as needed      Interventions Diet: continued as ordered   Nutrition Recommendations Continue diet as ordered   Nutrition Complexity Risk   Nutrition complexity level Low risk   Nutrition review: 12/01/17  (po intakes, diet ed needs?)   Follow up date 12/05/17

## 2017-11-26 NOTE — CASE MANAGEMENT
Initial Clinical Review    Admission: Date/Time/Statement:  11/25/17 2009    Orders Placed This Encounter   Procedures    Place in Observation (expected length of stay for this patient is less than two midnights)     Standing Status:   Standing     Number of Occurrences:   1     Order Specific Question:   Admitting Physician     Answer:   Samara Merlin     Order Specific Question:   Level of Care     Answer:   Med Surg [16]         ED: Date/Time/Mode of Arrival:   ED Arrival Information     Expected Arrival Acuity Means of Arrival Escorted By Service Admission Type    - 11/25/2017 18:56 Emergent Walk-In Family Member General Medicine Emergency    Arrival Complaint    chest pain          Chief Complaint:   Chief Complaint   Patient presents with    Chest Pain     States he awoke with " indigestion " , pain chest and back all day with nausea  No pain at present        History of Illness:     76 yowm c/o substernal chest pain all day since 9 am, described as "burning" and says he feels like it is indigestion  Daughter says he has trouble swallowing and often vomits after he eats  Has h/o esophageal cancer and stent placed a week ago  ED Vital Signs:   ED Triage Vitals [11/25/17 1901]   Temperature Pulse Respirations Blood Pressure SpO2   97 7 °F (36 5 °C) (!) 137 20 (!) 173/83 93 %      Temp Source Heart Rate Source Patient Position - Orthostatic VS BP Location FiO2 (%)   Oral Monitor Lying Left arm --      Pain Score       No Pain        Wt Readings from Last 1 Encounters:   11/26/17 120 kg (264 lb 15 9 oz)       Vital Signs (abnormal):     Date and Time Temp Pulse SpO2 Resp BP Pain Score   11/25/17 1945 97 6 °F (36 4 °C)  118 98 % 20 156/72 No Pain   11/25/17 1939 -- -- -- -- 155/70 --   11/25/17 1915 --  132 --  23  173/83 --     Abnormal Labs/Diagnostic Test Results: Tachycardia present         Chloride 99 (L) mmol/L   BUN 27 (H) mg/dL   Creatinine 1 70 (H) mg/dL   Calcium 10 3 (H) mg/dL Albumin 2 9 (L) g/dL     RBC 4 36 (L) Million/uL   Hemoglobin 12 4 (L) g/dL   Hematocrit 38 7 (L) %   MPV 8 2 (L) fL           ED Treatment:   Medication Administration from 11/25/2017 1856 to 11/25/2017 2058       Date/Time Order Dose Route     11/25/2017 1922 metoprolol (LOPRESSOR) injection 5 mg 5 mg Intravenous     11/25/2017 1922 sodium chloride 0 9 % infusion 125 mL/hr Intravenous     11/25/2017 1940 metoprolol (LOPRESSOR) injection 5 mg 5 mg Intravenous     11/25/2017 1959 metoprolol tartrate (LOPRESSOR) tablet 50 mg 50 mg Oral     11/25/2017 1959 amiodarone tablet 200 mg 200 mg Oral       Past Medical/Surgical History:    Active Ambulatory Problems     Diagnosis Date Noted    Atrial flutter with rapid ventricular response ([place] [address] ) 08/30/2017    Dysphagia 08/30/2017    Hypothyroidism 08/30/2017    Anxiety disorder 08/31/2017    Body mass index (BMI) of 45 0-49 9 in adult ([place] [address] ) 08/31/2017    Cardiomyopathy ([place] [address] ) 08/31/2017    Chronic kidney disease 08/31/2017    Chronic coronary artery disease 08/31/2017    Mixed anxiety depressive disorder 08/31/2017    Gastroesophageal reflux disease 08/31/2017    Multiple-type hyperlipidemia 08/31/2017    Presence of cardiac pacemaker 08/31/2017    Esophageal cancer ([place] [address] ) 11/17/2017     Resolved Ambulatory Problems     Diagnosis Date Noted    LIVAN (acute kidney injury) ([place] [address] ) 08/30/2017    Chronic combined systolic and diastolic heart failure ([place] [address] ) 08/31/2017    History of esophagogastroduodenoscopy (EGD) 11/18/2017     Past Medical History:    Anxiety    Arthritis    Atrial fibrillation (HCC)    Cardiac disease    Disease of thyroid gland    Dysphagia    GERD (gastroesophageal reflux disease)    Hernia, abdominal    Hyperlipidemia    Hypertension       Admitting Diagnosis: Chest pain [R07 9]  Tachycardia [R00 0]  Acute on chronic renal failure (HCC) [N17 9, N18 9]    Age/Sex: 77 y o  male    Assessment/Plan:     78 yo male w/ PMH of PMH of afib on xarelto, CHF s/p ICD interrogation, CAD, HTN, HLD, esophageal cancer s/p esophageal stent placement presenting with chest pain who is being admitted under the service of Dr Hernesto Barone and is expected to stay for 23 hour observation   Patient will be discharged home once stable      Plan:  Chest pain r/o ACS  -trop x1 negative, trend x2  -per Er, pt arrived in A-flutter, given IV lopressor 5mg x2, lopressor 50mg PO x1, amiodarone 200mg PO x1  -EKG showed, repeat EKG in AM  -nitrostat 0 4mg q5min PRN  -cont aspirin 81mg  -Nasal cannula to maintain O2 sat >92%  -telemetry monitoring  -repeat AM labs, keeps K>4, Mg>2  -consult cardiology       Admission Orders:    TREND TROPONIN   TELEMETRY  CONSULT CARDIOLOGY  SEQUENTIAL OMPRESSION DEVICE  CARDIAC  2 3 GM NA   FLUID RESTRICT 1800 ML      Scheduled Meds:   amiodarone 200 mg Oral BID   aspirin 81 mg Oral Daily   furosemide 40 mg Oral Daily   levothyroxine 25 mcg Oral Early Morning   lisinopril 5 mg Oral Daily   metoprolol succinate 50 mg Oral Daily   pantoprazole 40 mg Oral Early Morning   pravastatin 80 mg Oral Daily   rivaroxaban 20 mg Oral Daily With Breakfast   sertraline 50 mg Oral Daily   sucralfate 1,000 mg Oral 4x Daily (AC & HS)   traZODone 150 mg Oral HS     Continuous Infusions:   dextrose 5 % and sodium chloride 0 45 % with KCl 10 mEq/L 150 mL/hr Last Rate: 150 mL/hr (11/25/17 2250)     PRN Meds:   acetaminophen    nitroglycerin

## 2017-11-26 NOTE — PLAN OF CARE
CARDIOVASCULAR - ADULT     Maintains optimal cardiac output and hemodynamic stability Progressing     Absence of cardiac dysrhythmias or at baseline rhythm Progressing        DISCHARGE PLANNING     Discharge to home or other facility with appropriate resources Progressing        GASTROINTESTINAL - ADULT     Minimal or absence of nausea and/or vomiting Progressing        GENITOURINARY - ADULT     Maintains or returns to baseline urinary function Progressing        INFECTION - ADULT     Absence or prevention of progression during hospitalization Progressing        Knowledge Deficit     Patient/family/caregiver demonstrates understanding of disease process, treatment plan, medications, and discharge instructions Progressing        Nutrition/Hydration-ADULT     Nutrient/Hydration intake appropriate for improving, restoring or maintaining nutritional needs Progressing        PAIN - ADULT     Verbalizes/displays adequate comfort level or baseline comfort level Progressing        SAFETY ADULT     Patient will remain free of falls Progressing     Maintain or return to baseline ADL function Progressing     Maintain or return mobility status to optimal level Progressing        Plan of care discussed with Pt and Pt is progressing

## 2017-11-26 NOTE — ED PROVIDER NOTES
History  Chief Complaint   Patient presents with    Chest Pain     States he awoke with " indigestion " , pain chest and back all day with nausea  No pain at present      76 yowm c/o substernal chest pain all day since 9 am, described as "burning" and says he feels like it is indigestion  No sob  Does not feel his heart is racing or palpitating  Has not taken his pills all day  Daughter says he has trouble swallowing and often vomits after he eats  Has h/o esophageal cancer and stent placed a week ago  Pt  Doesn't seem sure what pills he is on  No fever  History provided by:  Patient   used: No    Chest Pain   Associated symptoms: vomiting    Associated symptoms: no abdominal pain, no back pain, no cough, no dizziness, no fever, no headache, no nausea and no shortness of breath        Prior to Admission Medications   Prescriptions Last Dose Informant Patient Reported? Taking?    Multiple Vitamin (MULTIVITAMIN) tablet 11/24/2017 at Unknown time  Yes Yes   Sig: Take 1 tablet by mouth daily   Omega-3-Acid Eth Est, Dietary, 1 g CAPS 11/24/2017 at Unknown time  Yes Yes   Sig: Take by mouth   amiodarone 200 mg tablet 11/24/2017 at Unknown time  No Yes   Sig: Take 1 tablet by mouth 2 (two) times a day   aspirin (ASPIRIN LOW DOSE) 81 MG tablet 11/24/2017 at Unknown time  Yes Yes   Sig: Take by mouth   furosemide (LASIX) 40 mg tablet 11/24/2017 at Unknown time  Yes Yes   Sig: Take by mouth daily     levothyroxine 25 mcg tablet 11/24/2017 at Unknown time  Yes Yes   Sig: Take 25 mcg by mouth daily     lisinopril (ZESTRIL) 5 mg tablet 11/24/2017 at Unknown time  Yes Yes   Sig: Take 5 mg by mouth     metoprolol succinate (TOPROL-XL) 50 mg 24 hr tablet 11/24/2017 at Unknown time  Yes Yes   Sig: Take 50 mg by mouth daily Takes 3 tabs    omeprazole (PriLOSEC) 40 MG capsule 11/24/2017 at Unknown time  Yes Yes   Sig: Take by mouth daily     pravastatin (PRAVACHOL) 80 mg tablet 11/24/2017 at Unknown time  Yes Yes   Sig: Take by mouth daily     rivaroxaban (XARELTO) 20 mg tablet 11/24/2017 at Unknown time  No Yes   Sig: Take 1 tablet by mouth daily with breakfast   Patient taking differently: Take 20 mg by mouth daily with breakfast Last dose end of oct  sertraline (ZOLOFT) 50 mg tablet 11/24/2017 at Unknown time  Yes Yes   Sig: Take 50 mg by mouth daily     traZODone (DESYREL) 150 mg tablet 11/24/2017 at Unknown time  Yes Yes   Sig: Take 150 mg by mouth daily at bedtime        Facility-Administered Medications: None       Past Medical History:   Diagnosis Date    Anxiety     Arthritis     Atrial fibrillation ([facility] [address] )     h/o atrial fib/ flutter    Cardiac disease     Disease of thyroid gland     Dysphagia     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    GERD (gastroesophageal reflux disease)     Hernia, abdominal     chronic umbilical    Hyperlipidemia     Hypertension        Past Surgical History:   Procedure Laterality Date    CARDIAC PACEMAKER PLACEMENT      CARDIAC PACEMAKER PLACEMENT Left     placed 25 years ago    ESOPHAGOGASTRODUODENOSCOPY N/A 10/31/2017    Procedure: ESOPHAGOGASTRODUODENOSCOPY (EGD); Surgeon: Shashi Harper MD;  Location: [facility] GI LAB; Service: Gastroenterology    ESOPHAGOGASTRODUODENOSCOPY N/A 11/17/2017    Procedure: ESOPHAGOGASTRODUODENOSCOPY (EGD) WITH STENT;  Surgeon: Shashi Harper MD;  Location: [facility] OR;  Service: Gastroenterology    FRACTURE SURGERY      LEG SURGERY      TIBIA FRACTURE SURGERY      orif left leg 1986       History reviewed  No pertinent family history  I have reviewed and agree with the history as documented  Social History   Substance Use Topics    Smoking status: Former Smoker     Packs/day: 1 00     Types: Cigarettes     Quit date: 11/16/1997    Smokeless tobacco: Never Used    Alcohol use No        Review of Systems   Constitutional: Negative  Negative for chills and fever  HENT: Negative    Negative for congestion and sore throat  Eyes: Negative  Respiratory: Negative  Negative for cough and shortness of breath  Cardiovascular: Positive for chest pain  Negative for leg swelling  Gastrointestinal: Positive for vomiting  Negative for abdominal pain, diarrhea and nausea  Genitourinary: Negative  Negative for dysuria, flank pain and hematuria  Musculoskeletal: Negative  Negative for back pain and myalgias  Skin: Negative  Negative for rash and wound  Neurological: Negative  Negative for dizziness and headaches  Psychiatric/Behavioral: Negative  Negative for confusion and hallucinations  The patient is not nervous/anxious  All other systems reviewed and are negative  Physical Exam  ED Triage Vitals [11/25/17 1901]   Temperature Pulse Respirations Blood Pressure SpO2   97 7 °F (36 5 °C) (!) 137 20 (!) 173/83 93 %      Temp Source Heart Rate Source Patient Position - Orthostatic VS BP Location FiO2 (%)   Oral Monitor Lying Left arm --      Pain Score       No Pain           Orthostatic Vital Signs  Vitals:    11/25/17 1915 11/25/17 1939 11/25/17 1945 11/25/17 1959   BP: (!) 173/83 155/70 156/72 145/72   Pulse: (!) 132  (!) 118 75   Patient Position - Orthostatic VS:   Lying        Physical Exam   Constitutional: He appears well-developed and well-nourished  No distress  HENT:   Head: Normocephalic and atraumatic  Eyes: Conjunctivae are normal  Pupils are equal, round, and reactive to light  No scleral icterus  Neck: Normal range of motion  Neck supple  Cardiovascular: Regular rhythm and normal heart sounds  Tachycardia present  No murmur heard  Pulmonary/Chest: Effort normal and breath sounds normal  No respiratory distress  He exhibits no tenderness  Abdominal: Soft  Bowel sounds are normal  He exhibits no distension  There is no tenderness  Musculoskeletal: Normal range of motion  He exhibits no edema, tenderness or deformity  Neurological: He is alert   No cranial nerve deficit  Skin: Skin is warm and dry  No rash noted  He is not diaphoretic  No erythema  No pallor  Psychiatric: He has a normal mood and affect  His behavior is normal    Nursing note and vitals reviewed  ED Medications  Medications   sodium chloride 0 9 % infusion (125 mL/hr Intravenous New Bag 11/25/17 1922)   metoprolol (LOPRESSOR) injection 5 mg (5 mg Intravenous Given 11/25/17 1922)   metoprolol (LOPRESSOR) injection 5 mg (5 mg Intravenous Given 11/25/17 1940)   metoprolol tartrate (LOPRESSOR) tablet 50 mg (50 mg Oral Given 11/25/17 1959)   amiodarone tablet 200 mg (200 mg Oral Given 11/25/17 1959)       Diagnostic Studies  Results Reviewed     Procedure Component Value Units Date/Time    Troponin I [[id number]]  (Normal) Collected:  11/25/17 1909    Lab Status:  Final result Specimen:  Blood from Arm, Left Updated:  11/25/17 2000     Troponin I <0 02 ng/mL     Narrative:         [employer] Chemistry analyzer 99% cutoff is > 0 04 ng/mL in network labs    o cTnI 99% cutoff is useful only when applied to patients in the clinical setting of myocardial ischemia  o cTnI 99% cutoff should be interpreted in the context of clinical history, ECG findings and possibly cardiac imaging to establish correct diagnosis  o cTnI 99% cutoff may be suggestive but clearly not indicative of a coronary event without the clinical setting of myocardial ischemia      Comprehensive metabolic panel [[id number]]  (Abnormal) Collected:  11/25/17 1909    Lab Status:  Final result Specimen:  Blood from Arm, Left Updated:  11/25/17 1945     Sodium 136 mmol/L      Potassium 4 3 mmol/L      Chloride 99 (L) mmol/L      CO2 26 mmol/L      Anion Gap 11 mmol/L      BUN 27 (H) mg/dL      Creatinine 1 70 (H) mg/dL      Glucose 114 mg/dL      Calcium 10 3 (H) mg/dL      AST 36 U/L      ALT 23 U/L      Alkaline Phosphatase 54 U/L      Total Protein 8 1 g/dL      Albumin 2 9 (L) g/dL      Total Bilirubin 0 50 mg/dL      eGFR 39 ml/min/1 73sq m Narrative:         National Kidney Disease Education Program recommendations are as follows:  GFR calculation is accurate only with a steady state creatinine  Chronic Kidney disease less than 60 ml/min/1 73 sq  meters  Kidney failure less than 15 ml/min/1 73 sq  meters  Protime-INR [[id number]]  (Normal) Collected:  11/25/17 1909    Lab Status:  Final result Specimen:  Blood from Arm, Left Updated:  11/25/17 1938     Protime 10 2 seconds      INR 0 97    APTT [[id number]]  (Normal) Collected:  11/25/17 1909    Lab Status:  Final result Specimen:  Blood from Arm, Left Updated:  11/25/17 1938     PTT 27 seconds     Narrative: Therapeutic Heparin Range = 60-90 seconds    CBC and differential [[id number]]  (Abnormal) Collected:  11/25/17 1909    Lab Status:  Final result Specimen:  Blood from Arm, Left Updated:  11/25/17 1936     WBC 9 00 Thousand/uL      RBC 4 36 (L) Million/uL      Hemoglobin 12 4 (L) g/dL      Hematocrit 38 7 (L) %      MCV 89 fL      MCH 28 4 pg      MCHC 32 0 g/dL      RDW 12 9 %      MPV 8 2 (L) fL      Platelets 164 Thousands/uL      Neutrophils Relative 70 %      Lymphocytes Relative 20 %      Monocytes Relative 5 %      Eosinophils Relative 4 %      Basophils Relative 1 %      Neutrophils Absolute 6 30 Thousands/µL      Lymphocytes Absolute 1 80 Thousands/µL      Monocytes Absolute 0 50 Thousand/µL      Eosinophils Absolute 0 30 Thousand/µL      Basophils Absolute 0 10 Thousands/µL                  XR chest 1 view portable   ED Interpretation by Jose Leger MD ([id number])   CM                 Procedures  Procedures       Phone Contacts  ED Phone Contact    ED Course  ED Course                                MDM  Number of Diagnoses or Management Options  Diagnosis management comments: Old records reviewed  Pt  Admitted in August with ? Vtach/wide complex tachy in the 160's and pacer was interrogated and pt  [address]  Discussed with Dr Yaima Holloway shortly after pt   Arrival   He advised can try Lopressor IV first and then if needed IV amiodarone  1950 - second dose of lopressor pt  Broke into paced rhythm at 69  Discussed with Dr Matthew Agudelo who wants short acting lopressor 50 mg oral and his normal dose of Amiodarone oral   Will admit for monitoring, tachyarrythmia, r/o ACS  D[facility] Dr Jayme Schmitz  [facility]    Disposition  Final diagnoses:   Tachycardia   Chest pain   Acute on chronic renal failure ([facility] [address] )     Time reflects when diagnosis was documented in both MDM as applicable and the Disposition within this note     Time User Action Codes Description Comment    68/83/1472  6:33 PM Emmanuel Hipps Add [T64 4] Tachycardia     11/34/9142  6:00 PM Loralie Wang A Add [F67 4] Chest pain     63/95/1475  2:27 PM Emmanuel Hipps Add [H80 5,  N18 9] Acute on chronic renal failure [facility])       ED Disposition     ED Disposition Condition Comment    Admit  Case was discussed with *Dr Jayme Schmitz** and the patient's admission status was agreed to be Admission Status: observation status to the service of [facility]  Follow-up Information    None       Patient's Medications   Discharge Prescriptions    No medications on file     No discharge procedures on file      ED Provider  Electronically Signed by           Isreal Purcell MD  78/71/63 5803

## 2017-11-26 NOTE — PROGRESS NOTES
[facility] Practice Progress Note - Efrain Diaz  77 y o  male MRN: [id number]    Unit/Bed#: [address] [place] [address] Encounter: [id number]      Assessment/Plan:  Plan:  Chest pain r/o ACS  -trop x3 negative  -per Er, pt arrived in A-flutter, given IV lopressor 5mg x2, lopressor 50mg PO x1, amiodarone 200mg PO x1  -EKG showed wide QRS tachycardia w occasional premature ventricular complexes, Left bundle branch block; repeat EKG pending this AM  -nitrostat 0 4mg q5min PRN  -cont aspirin 81mg  -Continue nasal cannula to maintain O2 sat >92%  -Continue telemetry monitoring  -AM labs  K 3 8, Mg 1 9  -consult cardiology     Hx of A-fib  -episode of A flutter in Er, s/p lopressor 50mg Po, IV lopressor 5mg X2, amiodarone 200mg PO  -cont xarelto 15mg BID and amiodarone 200mg BID  -Continue telemetry monitoring     CHF s/p ICD w/ normalization of EF  -stable, follows up with Dr Gaby Garner  -cont metoprolol 50mg daily  -cont lisinopril 5mg daily  -cont furosemide 40mg BID  -strict I/Os  -daily weights     CAD  -stable  -cont aspirin 81mg daily     HTN  -stable  -cont lisinopril 5mg daily, metoprolol 50mg daily     CKD Stage 3  -stable  -baseline Cr 1 5-1 8  -This AM Cr 1 53  - Continue to monitor daily BMP     Esophageal cancer s/p esophageal stent placement  -stable  -follows w/ Dr Pham Espana  -cont protonix 40mg BID  -carafate 10ml qac qhs  -zofran 4mg q6hr PRN     Hypothyroidism  -stable  -TSH 7 178  -cont levothyroxine 25mcg daily     HLD  -stable  -Lipid panel Chol 196   Tri 137   HDL 32      -cont pravastatin 80mg daily     Anixety  -stable  -cont zoloft 50mg daily     Insomnia  -stable  -cont trazadone 150mg qhs     FEN  -HOLD IV D51/2NS w/ KCL 10 @ 150cc/hr  -replete electrolytes as needed  -Cardiac 2 3 GM Sodium, 1 8L fluid restriction   -xarelto and SCDs for DVT prophylaxis         Subjective:   Pt seen and examined at bedside   Pt states that he continues to have a burning -like pain in his chest  He tolerated his breakfast well  Pt has not had a bowel movement yet  Pt denies fever, chills, shortness of breath, chest pain, abdominal pain, or urinary changes  Objective:     Vitals: Blood pressure 128/60, pulse 70, temperature 98 5 °F (36 9 °C), temperature source Tympanic, resp  rate 18, height 5' 7" (1 702 m), weight 120 kg (264 lb 15 9 oz), SpO2 94 %  ,Body mass index is 41 5 kg/m²    Wt Readings from Last 3 Encounters:   11/26/17 120 kg (264 lb 15 9 oz)   11/17/17 120 kg (265 lb)   09/01/17 125 kg (276 lb 7 3 oz)     No intake or output data in the 24 hours ending 11/26/17 0825    Physical Exam: General appearance: alert, appears stated age and cooperative  Lungs: clear to auscultation bilaterally  Heart: regular rate and rhythm, S1, S2 normal, no murmur, click, rub or gallop  Abdomen: soft, non-tender; bowel sounds normal; no masses,  no organomegaly  Extremities: mild edema in lower extremities b/l, no cyanosis or signs of trauma     Recent Results (from the past 24 hour(s))   Comprehensive metabolic panel    Collection Time: 11/25/17  7:09 PM   Result Value Ref Range    Sodium 136 136 - 145 mmol/L    Potassium 4 3 3 5 - 5 3 mmol/L    Chloride 99 (L) 100 - 108 mmol/L    CO2 26 21 - 32 mmol/L    Anion Gap 11 4 - 13 mmol/L    BUN 27 (H) 5 - 25 mg/dL    Creatinine 1 70 (H) 0 60 - 1 30 mg/dL    Glucose 114 65 - 140 mg/dL    Calcium 10 3 (H) 8 3 - 10 1 mg/dL    AST 36 5 - 45 U/L    ALT 23 12 - 78 U/L    Alkaline Phosphatase 54 46 - 116 U/L    Total Protein 8 1 6 4 - 8 2 g/dL    Albumin 2 9 (L) 3 5 - 5 0 g/dL    Total Bilirubin 0 50 0 20 - 1 00 mg/dL    eGFR 39 ml/min/1 73sq m   CBC and differential    Collection Time: 11/25/17  7:09 PM   Result Value Ref Range    WBC 9 00 4 80 - 10 80 Thousand/uL    RBC 4 36 (L) 4 70 - 6 10 Million/uL    Hemoglobin 12 4 (L) 14 0 - 18 0 g/dL    Hematocrit 38 7 (L) 42 0 - 52 0 %    MCV 89 82 - 98 fL    MCH 28 4 27 0 - 31 0 pg    MCHC 32 0 31 4 - 37 4 g/dL    RDW 12 9 11 6 - 15 1 %    MPV 8 2 (L) 8 9 - 12 7 fL    Platelets 883 895 - 943 Thousands/uL    Neutrophils Relative 70 43 - 75 %    Lymphocytes Relative 20 14 - 44 %    Monocytes Relative 5 4 - 12 %    Eosinophils Relative 4 0 - 6 %    Basophils Relative 1 0 - 1 %    Neutrophils Absolute 6 30 1 85 - 7 62 Thousands/µL    Lymphocytes Absolute 1 80 0 60 - 4 47 Thousands/µL    Monocytes Absolute 0 50 0 17 - 1 22 Thousand/µL    Eosinophils Absolute 0 30 0 00 - 0 61 Thousand/µL    Basophils Absolute 0 10 0 00 - 0 10 Thousands/µL   Protime-INR    Collection Time: 11/25/17  7:09 PM   Result Value Ref Range    Protime 10 2 9 4 - 11 7 seconds    INR 0 97 0 86 - 1 16   APTT    Collection Time: 11/25/17  7:09 PM   Result Value Ref Range    PTT 27 24 - 33 seconds   Troponin I    Collection Time: 11/25/17  7:09 PM   Result Value Ref Range    Troponin I <0 02 <=0 04 ng/mL   TSH, 3rd generation    Collection Time: 11/25/17  7:09 PM   Result Value Ref Range    TSH 3RD GENERATON 7 178 (H) 0 358 - 3 740 uIU/mL   Magnesium    Collection Time: 11/25/17  7:09 PM   Result Value Ref Range    Magnesium 2 1 1 6 - 2 6 mg/dL   Troponin I    Collection Time: 11/26/17 12:54 AM   Result Value Ref Range    Troponin I <0 02 <=0 04 ng/mL   Basic metabolic panel    Collection Time: 11/26/17  6:13 AM   Result Value Ref Range    Sodium 136 136 - 145 mmol/L    Potassium 3 8 3 5 - 5 3 mmol/L    Chloride 100 100 - 108 mmol/L    CO2 27 21 - 32 mmol/L    Anion Gap 9 4 - 13 mmol/L    BUN 27 (H) 5 - 25 mg/dL    Creatinine 1 53 (H) 0 60 - 1 30 mg/dL    Glucose 117 65 - 140 mg/dL    Glucose, Fasting 117 (H) 65 - 99 mg/dL    Calcium 9 2 8 3 - 10 1 mg/dL    eGFR 44 ml/min/1 73sq m   Magnesium    Collection Time: 11/26/17  6:13 AM   Result Value Ref Range    Magnesium 1 9 1 6 - 2 6 mg/dL   CBC (With Platelets)    Collection Time: 11/26/17  6:13 AM   Result Value Ref Range    WBC 8 50 4 80 - 10 80 Thousand/uL    RBC 3 74 (L) 4 70 - 6 10 Million/uL    Hemoglobin 11 2 (L) 14 0 - 18 0 g/dL Hematocrit 33 1 (L) 42 0 - 52 0 %    MCV 89 82 - 98 fL    MCH 29 9 27 0 - 31 0 pg    MCHC 33 7 31 4 - 37 4 g/dL    RDW 12 9 11 6 - 15 1 %    Platelets 020 675 - 054 Thousands/uL    MPV 7 9 (L) 8 9 - 12 7 fL   Phosphorus    Collection Time: 11/26/17  6:13 AM   Result Value Ref Range    Phosphorus 2 7 2 3 - 4 1 mg/dL   Lipid panel    Collection Time: 11/26/17  6:13 AM   Result Value Ref Range    Cholesterol 196 50 - 200 mg/dL    Triglycerides 137 <=150 mg/dL    HDL, Direct 32 (L) 40 - 60 mg/dL    LDL Calculated 137 (H) 0 - 100 mg/dL   Troponin I    Collection Time: 11/26/17  6:15 AM   Result Value Ref Range    Troponin I <0 02 <=0 04 ng/mL       Current Facility-Administered Medications   Medication Dose Route Frequency Provider Last Rate Last Dose    acetaminophen (TYLENOL) tablet 650 mg  650 mg Oral Q6H PRN David Cortes MD   650 mg at 11/26/17 0000    amiodarone tablet 200 mg  200 mg Oral BID Margret Cat MD        aspirin chewable tablet 81 mg  81 mg Oral Daily Margret Cat MD        dextrose 5 % and sodium chloride 0 45 % with KCl 10 mEq/L infusion  150 mL/hr Intravenous Continuous Margret Cat  mL/hr at 11/25/17 2250 150 mL/hr at 11/25/17 2250    furosemide (LASIX) tablet 40 mg  40 mg Oral Daily Margret Cat MD        levothyroxine tablet 25 mcg  25 mcg Oral Early Morning Margret Cat MD   25 mcg at 11/26/17 0533    lisinopril (ZESTRIL) tablet 5 mg  5 mg Oral Daily Margret Cat MD        metoprolol succinate (TOPROL-XL) 24 hr tablet 50 mg  50 mg Oral Daily Margret Cat MD        nitroglycerin (NITROSTAT) SL tablet 0 4 mg  0 4 mg Sublingual Q5 Min PRN Margret Cat MD        pantoprazole (PROTONIX) EC tablet 40 mg  40 mg Oral Early Morning Margret Cat MD   40 mg at 11/26/17 0533    pravastatin (PRAVACHOL) tablet 80 mg  80 mg Oral Daily Margret Cat MD   80 mg at 11/25/17 2249    rivaroxaban (XARELTO) tablet 20 mg  20 mg Oral Daily With Breakfast Margret Cat MD        sertraline (ZOLOFT) tablet 50 mg  50 mg Oral Daily Margret Cat MD        sucralfate (CARAFATE) oral suspension 1,000 mg  1,000 mg Oral 4x Daily (AC & HS) Margret Cat MD   1,000 mg at 11/26/17 0616    traZODone (DESYREL) tablet 150 mg  150 mg Oral HS Margret Cat MD   150 mg at 11/25/17 2250       Invasive Devices     Peripheral Intravenous Line            Peripheral IV 11/25/17 Left Hand less than 1 day                Lab, Imaging and other studies: I have personally reviewed pertinent reports  VTE Pharmacologic Prophylaxis: Xeralto   VTE Mechanical Prophylaxis: sequential compression device    Orren Lab, DO     SENIOR RESIDENT NOTE: I have personally seen and examined the patient, and agree with the residents assessment  Patient denies any chest pressure shortness of breath, palpitations, dizziness, nausea or vomiting  Has tolerated p o  intake well  Heart rate remains 69-75  Continue current regime  Follow up Cardiology    Physical exam as above    Alycia Ross MD , MD

## 2017-11-26 NOTE — CONSULTS
Consultation - Cardiology   Marisa Morris Sr  77 y o  male MRN: [id number]  Unit/Bed#: [address] [address] Encounter: [id number]  11/26/17  10:18 AM          Physician Requesting Consult: Claudia Mcnulty DO  Reason for Consult / Principal Problem: tachycardia      Assessment:  1  Chest pain - may have been related to tachycardia but persists even after flutter resolved  No recent ischemia evaluation  Will plan for stress test in AM   Troponin has been negative  2  Atrial flutter - patient has a history of atrial flutter and is on amiodarone and metoprolol along with Xarelto  3  Hypothyroidism - TSH with 7 2 - ? Able to take PO synthroid routinely  4  Dyslipidemia - Pravastatin  5  Esophageal cancer with recent esophageal stent placement  6  Hypertension - continue lisinopril and metoprolol  7  CHF - chronic combined systolic and diastolic dysfunction - S/p BiV ICD  Last EF was 45%  Plan:  As above      History of Present Illness   HPI: Marisa Morris Sr  is a 77y o  year old male who presents with chest pain  The patient had pain all day yesterday which he describes as a burning sensation  Symptoms similar to previous atrial fibrillation episodes  He has been unable to take his medications because of difficulty swallowing  He recently had an esophageal stent placed because of esophageal CA  In ER, he was noted to be in atrial flutter with RVR  He was given lopressor IV which converted him to sinus rhythm  Amiodarone was restarted along with PO lopressor  He has remained in sinus rhythm since then  He continues to have a burning sensation in his chest   No recent cardiac catheterization or stress test   He has a history of non-ischemic cardiomyopathy with previous BiV/ICD placement  Developed tender gynecomastia with aldactone  Most recent echocardiogram in 8/2017 showed EF of 45-50% and elevated LA filling pressure  Mild MR and TR        Review of Systems:    Review of Systems Constitutional: Negative for chills and fever  HENT: Positive for trouble swallowing  Respiratory: Positive for chest tightness and shortness of breath  Cardiovascular: Negative for palpitations and leg swelling  Gastrointestinal: Positive for nausea and vomiting  Musculoskeletal: Negative  Neurological: Negative for weakness and numbness  All other systems reviewed and are negative  Historical Information   Past Medical History:   Diagnosis Date    Anxiety     Arthritis     Atrial fibrillation ([address] )     h/o atrial fib/ flutter    Cardiac disease     Disease of thyroid gland     Dysphagia     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    GERD (gastroesophageal reflux disease)     Hernia, abdominal     chronic umbilical    Hyperlipidemia     Hypertension      Past Surgical History:   Procedure Laterality Date    CARDIAC PACEMAKER PLACEMENT      CARDIAC PACEMAKER PLACEMENT Left     placed 25 years ago    ESOPHAGOGASTRODUODENOSCOPY N/A 10/31/2017    Procedure: ESOPHAGOGASTRODUODENOSCOPY (EGD); Surgeon: Jenni Henry MD;  Location: [facility]; Service: Gastroenterology    ESOPHAGOGASTRODUODENOSCOPY N/A 11/17/2017    Procedure: ESOPHAGOGASTRODUODENOSCOPY (EGD) WITH STENT;  Surgeon: Jenni Henry MD;  Location: [facility];  Service: Gastroenterology    FRACTURE SURGERY      LEG SURGERY      TIBIA FRACTURE SURGERY      orif left leg 1986     History   Alcohol Use No     History   Drug Use No     History   Smoking Status    Former Smoker    Packs/day: 1 00    Types: Cigarettes    Quit date: 11/16/1997   Smokeless Tobacco    Never Used       Family History: History reviewed  No pertinent family history      Meds/Allergies   current meds:   Current Facility-Administered Medications   Medication Dose Route Frequency    acetaminophen (TYLENOL) tablet 650 mg  650 mg Oral Q6H PRN    amiodarone tablet 200 mg  200 mg Oral BID    aspirin chewable tablet 81 mg  81 mg Oral Daily    dextrose 5 % and sodium chloride 0 45 % with KCl 10 mEq/L infusion  150 mL/hr Intravenous Continuous    furosemide (LASIX) tablet 40 mg  40 mg Oral Daily    levothyroxine tablet 25 mcg  25 mcg Oral Early Morning    lisinopril (ZESTRIL) tablet 5 mg  5 mg Oral Daily    metoprolol succinate (TOPROL-XL) 24 hr tablet 50 mg  50 mg Oral Daily    nitroglycerin (NITROSTAT) SL tablet 0 4 mg  0 4 mg Sublingual Q5 Min PRN    pantoprazole (PROTONIX) EC tablet 40 mg  40 mg Oral Early Morning    pravastatin (PRAVACHOL) tablet 80 mg  80 mg Oral Daily    rivaroxaban (XARELTO) tablet 20 mg  20 mg Oral Daily With Breakfast    sertraline (ZOLOFT) tablet 50 mg  50 mg Oral Daily    sucralfate (CARAFATE) oral suspension 1,000 mg  1,000 mg Oral 4x Daily (AC & HS)    traZODone (DESYREL) tablet 150 mg  150 mg Oral HS     No Known Allergies    Objective   Vitals: Blood pressure 160/83, pulse 69, temperature 98 2 °F (36 8 °C), temperature source Oral, resp  rate 18, height 5' 7" (1 702 m), weight 120 kg (264 lb 15 9 oz), SpO2 94 %  , Body mass index is 41 5 kg/m²  Physical Exam   Constitutional: He appears healthy  No distress  HENT:   Nose: Nose normal    Mouth/Throat: Oropharynx is clear  Eyes: Conjunctivae are normal  Pupils are equal, round, and reactive to light  Neck: Neck supple  Cardiovascular: Normal rate and regular rhythm  Murmur heard  Systolic murmur is present with a grade of 2/6  at the upper left sternal border  Pulmonary/Chest: Breath sounds normal  He has no wheezes  He has no rales  Abdominal: Soft  He exhibits no distension  There is no tenderness  Musculoskeletal: He exhibits no edema  Neurological: He is alert and oriented to person, place, and time  Skin: Skin is warm and dry  No rash noted         Lab Results:     Troponins:   Results from last 7 days  Lab Units 11/26/17  0615 11/26/17  0054 11/25/17  1909   TROPONIN I ng/mL <0 02 <0 02 <0 02       CBC with diff:   Results from last 7 days  Lab Units 11/26/17  0613 11/25/17  1909   WBC Thousand/uL 8 50 9 00   HEMOGLOBIN g/dL 11 2* 12 4*   HEMATOCRIT % 33 1* 38 7*   MCV fL 89 89   PLATELETS Thousands/uL 291 325   MCH pg 29 9 28 4   MCHC g/dL 33 7 32 0   RDW % 12 9 12 9   MPV fL 7 9* 8 2*       CMP:   Results from last 7 days  Lab Units 11/26/17  0613 11/25/17  1909   SODIUM mmol/L 136 136   POTASSIUM mmol/L 3 8 4 3   CHLORIDE mmol/L 100 99*   CO2 mmol/L 27 26   ANION GAP mmol/L 9 11   BUN mg/dL 27* 27*   CREATININE mg/dL 1 53* 1 70*   GLUCOSE RANDOM mg/dL 117 114   CALCIUM mg/dL 9 2 10 3*   AST U/L  --  36   ALT U/L  --  23   ALK PHOS U/L  --  54   TOTAL PROTEIN g/dL  --  8 1   ALBUMIN g/dL  --  2 9*   BILIRUBIN TOTAL mg/dL  --  0 50   EGFR ml/min/1 73sq m 44 39       Magnesium:   Results from last 7 days  Lab Units 11/26/17  0613 11/25/17  1909   MAGNESIUM mg/dL 1 9 2 1       Coags:   Results from last 7 days  Lab Units 11/25/17  1909   PTT seconds 27   INR  0 97       Lipid Profile:   Results from last 7 days  Lab Units 11/26/17  0613   CHOLESTEROL mg/dL 196   TRIGLYCERIDES mg/dL 137   HDL mg/dL 32*   LDL CALC mg/dL 137*         Cardiac testing:   Most recent echocardiogram in 8/2017 showed EF of 45-50% and elevated LA filling pressure  Mild MR and TR     EKG: Personally reviewed: A-V sequential pacing  Imaging: I have personally reviewed pertinent reports

## 2017-11-26 NOTE — MALNUTRITION/BMI
This medical record reflects one or more clinical indicators suggestive of malnutrition and/or morbid obesity  Please indicate the one diagnosis below which you feel best reflects the clinical picture  BMI Findings:  40-44 9    Body mass index is 41 5 kg/m²  See Nutrition note dated 11/26/2017 for additional details  Completed nutrition assessment is viewable in the nutrition documentation

## 2017-11-27 ENCOUNTER — APPOINTMENT (OUTPATIENT)
Dept: RADIOLOGY | Facility: HOSPITAL | Age: 77
End: 2017-11-27
Payer: COMMERCIAL

## 2017-11-27 ENCOUNTER — APPOINTMENT (OUTPATIENT)
Dept: NON INVASIVE DIAGNOSTICS | Facility: HOSPITAL | Age: 77
End: 2017-11-27
Payer: COMMERCIAL

## 2017-11-27 ENCOUNTER — GENERIC CONVERSION - ENCOUNTER (OUTPATIENT)
Dept: OTHER | Facility: OTHER | Age: 77
End: 2017-11-27

## 2017-11-27 LAB
ANION GAP SERPL CALCULATED.3IONS-SCNC: 7 MMOL/L (ref 4–13)
BUN SERPL-MCNC: 22 MG/DL (ref 5–25)
CALCIUM SERPL-MCNC: 8.6 MG/DL (ref 8.3–10.1)
CHEST PAIN STATEMENT: NORMAL
CHLORIDE SERPL-SCNC: 102 MMOL/L (ref 100–108)
CO2 SERPL-SCNC: 28 MMOL/L (ref 21–32)
CREAT SERPL-MCNC: 1.32 MG/DL (ref 0.6–1.3)
ERYTHROCYTE [DISTWIDTH] IN BLOOD BY AUTOMATED COUNT: 12.7 % (ref 11.6–15.1)
GFR SERPL CREATININE-BSD FRML MDRD: 52 ML/MIN/1.73SQ M
GLUCOSE P FAST SERPL-MCNC: 99 MG/DL (ref 65–99)
GLUCOSE SERPL-MCNC: 99 MG/DL (ref 65–140)
HCT VFR BLD AUTO: 33 % (ref 42–52)
HGB BLD-MCNC: 11.1 G/DL (ref 14–18)
MAGNESIUM SERPL-MCNC: 1.9 MG/DL (ref 1.6–2.6)
MAX DIASTOLIC BP: 69 MMHG
MAX HEART RATE: 93 BPM
MAX PREDICTED HEART RATE: 145 BPM
MAX. SYSTOLIC BP: 162 MMHG
MCH RBC QN AUTO: 29.3 PG (ref 27–31)
MCHC RBC AUTO-ENTMCNC: 33.5 G/DL (ref 31.4–37.4)
MCV RBC AUTO: 88 FL (ref 82–98)
MRSA NOSE QL CULT: NORMAL
PHOSPHATE SERPL-MCNC: 3 MG/DL (ref 2.3–4.1)
PLATELET # BLD AUTO: 285 THOUSANDS/UL (ref 130–400)
PMV BLD AUTO: 8 FL (ref 8.9–12.7)
POTASSIUM SERPL-SCNC: 4.1 MMOL/L (ref 3.5–5.3)
PROTOCOL NAME: NORMAL
RBC # BLD AUTO: 3.78 MILLION/UL (ref 4.7–6.1)
REASON FOR TERMINATION: NORMAL
SODIUM SERPL-SCNC: 137 MMOL/L (ref 136–145)
TARGET HR FORMULA: NORMAL
TEST INDICATION: NORMAL
TIME IN EXERCISE PHASE: NORMAL
WBC # BLD AUTO: 7.5 THOUSAND/UL (ref 4.8–10.8)

## 2017-11-27 PROCEDURE — 93017 CV STRESS TEST TRACING ONLY: CPT

## 2017-11-27 PROCEDURE — 84100 ASSAY OF PHOSPHORUS: CPT | Performed by: STUDENT IN AN ORGANIZED HEALTH CARE EDUCATION/TRAINING PROGRAM

## 2017-11-27 PROCEDURE — 85027 COMPLETE CBC AUTOMATED: CPT | Performed by: STUDENT IN AN ORGANIZED HEALTH CARE EDUCATION/TRAINING PROGRAM

## 2017-11-27 PROCEDURE — A9502 TC99M TETROFOSMIN: HCPCS

## 2017-11-27 PROCEDURE — 80048 BASIC METABOLIC PNL TOTAL CA: CPT | Performed by: STUDENT IN AN ORGANIZED HEALTH CARE EDUCATION/TRAINING PROGRAM

## 2017-11-27 PROCEDURE — 78452 HT MUSCLE IMAGE SPECT MULT: CPT

## 2017-11-27 PROCEDURE — 83735 ASSAY OF MAGNESIUM: CPT | Performed by: STUDENT IN AN ORGANIZED HEALTH CARE EDUCATION/TRAINING PROGRAM

## 2017-11-27 RX ADMIN — PANTOPRAZOLE SODIUM 40 MG: 40 TABLET, DELAYED RELEASE ORAL at 05:39

## 2017-11-27 RX ADMIN — Medication 400 MG: at 18:16

## 2017-11-27 RX ADMIN — RIVAROXABAN 20 MG: 10 TABLET, FILM COATED ORAL at 07:38

## 2017-11-27 RX ADMIN — REGADENOSON 0.4 MG: 0.08 INJECTION, SOLUTION INTRAVENOUS at 10:08

## 2017-11-27 RX ADMIN — FUROSEMIDE 40 MG: 40 TABLET ORAL at 08:53

## 2017-11-27 RX ADMIN — SERTRALINE HYDROCHLORIDE 50 MG: 50 TABLET ORAL at 08:53

## 2017-11-27 RX ADMIN — METOPROLOL SUCCINATE 50 MG: 50 TABLET, FILM COATED, EXTENDED RELEASE ORAL at 08:52

## 2017-11-27 RX ADMIN — PRAVASTATIN SODIUM 80 MG: 80 TABLET ORAL at 08:56

## 2017-11-27 RX ADMIN — AMIODARONE HYDROCHLORIDE 200 MG: 200 TABLET ORAL at 08:53

## 2017-11-27 RX ADMIN — TRAZODONE HYDROCHLORIDE 150 MG: 50 TABLET ORAL at 21:48

## 2017-11-27 RX ADMIN — SUCRALFATE 1000 MG: 1 SUSPENSION ORAL at 11:13

## 2017-11-27 RX ADMIN — SUCRALFATE 1000 MG: 1 SUSPENSION ORAL at 16:20

## 2017-11-27 RX ADMIN — LISINOPRIL 5 MG: 5 TABLET ORAL at 08:53

## 2017-11-27 RX ADMIN — SUCRALFATE 1000 MG: 1 SUSPENSION ORAL at 21:49

## 2017-11-27 RX ADMIN — LEVOTHYROXINE SODIUM 25 MCG: 25 TABLET ORAL at 05:39

## 2017-11-27 RX ADMIN — Medication 400 MG: at 09:14

## 2017-11-27 RX ADMIN — SUCRALFATE 1000 MG: 1 SUSPENSION ORAL at 07:38

## 2017-11-27 RX ADMIN — AMIODARONE HYDROCHLORIDE 200 MG: 200 TABLET ORAL at 18:16

## 2017-11-27 RX ADMIN — ASPIRIN 81 MG 81 MG: 81 TABLET ORAL at 08:53

## 2017-11-27 NOTE — PLAN OF CARE
CARDIOVASCULAR - ADULT     Maintains optimal cardiac output and hemodynamic stability Progressing     Absence of cardiac dysrhythmias or at baseline rhythm Progressing        DISCHARGE PLANNING     Discharge to home or other facility with appropriate resources Progressing        DISCHARGE PLANNING - CARE MANAGEMENT     Discharge to post-acute care or home with appropriate resources Progressing        GASTROINTESTINAL - ADULT     Minimal or absence of nausea and/or vomiting Progressing        GENITOURINARY - ADULT     Maintains or returns to baseline urinary function Progressing        INFECTION - ADULT     Absence or prevention of progression during hospitalization Progressing        Knowledge Deficit     Patient/family/caregiver demonstrates understanding of disease process, treatment plan, medications, and discharge instructions Progressing        Nutrition/Hydration-ADULT     Nutrient/Hydration intake appropriate for improving, restoring or maintaining nutritional needs Progressing        PAIN - ADULT     Verbalizes/displays adequate comfort level or baseline comfort level Progressing        SAFETY ADULT     Patient will remain free of falls Progressing     Maintain or return to baseline ADL function Progressing     Maintain or return mobility status to optimal level Progressing        Plan of care discussed with Pt and Pt is progressing

## 2017-11-27 NOTE — PROGRESS NOTES
Pt developed ventricular tachycardia with a sustained rate above 100 BPM   [place] resident notified and new PRN orders received  See MAR

## 2017-11-27 NOTE — PROGRESS NOTES
[facility] Progress Note - Aylin Choi  77 y o  male MRN: [id number]    Unit/Bed#: [address] [place] [address] Encounter: [id number]      Assessment/Plan:  Chest pain r/o ACS  -trop x3 negative  -per Er, pt arrived in A-flutter, given IV lopressor 5mg x2, lopressor 50mg PO x1, amiodarone 200mg PO x1  -EKG showed wide QRS tachycardia w occasional premature ventricular complexes, Left bundle branch block; repeat EKG pending this AM  -Continue nitrostat 0 4mg q5min PRN  -Continue Aspirin 81mg  -Continue nasal cannula to maintain O2 sat >92%  -Continue telemetry monitoring  -AM labs  K 4 1 , Mg 1 9  -Pt had episode of sinus tach overnight at approx 100bpm  Lopressor 5mg IV was ordered, but was not administered   Pt was sleeping during the episode  -Cardio consult appreciated- pt scheduled to have Stress test done this AM     Hx of A-fib  -episode of A flutter in Er, s/p lopressor 50mg Po, IV lopressor 5mg X2, amiodarone 200mg PO  -Continue Xeralto 15mg BID and amiodarone 200mg BID  -Continue telemetry monitoring     CHF s/p ICD w/ normalization of EF  -stable, follows up with Dr Stanton Vera  -Continue metoprolol 50mg daily  -Continue  lisinopril 5mg daily  -Continue furosemide 40mg BID  -Continue to monitor strict I/Os  -daily weights     CAD  -stable  -Continue aspirin 81mg daily     HTN  -stable  -Continue lisinopril 5mg daily and metoprolol 50mg daily     CKD Stage 3  -stable  -baseline Cr 1 5-1 8  -This AM Cr 1 32  - Continue to monitor daily BMP     Esophageal cancer s/p esophageal stent placement  -stable  -follows w/ Dr Jenna Garrett  -Continue protonix 40mg BID  -Continue carafate 10ml qac qhs  -Continue zofran 4mg q6hr PRN     Hypothyroidism  -stable  -TSH 7 178 during this hospitalization  -Continue levothyroxine 25mcg daily     HLD  -stable  -Lipid panel Chol 196   Tri 137   HDL 32      -Continue pravastatin 80mg daily     Anixety  -stable  -Continue zoloft 50mg daily     Insomnia  -stable  -Continue trazadone 150mg qhs     FEN  -Heplock   -replete electrolytes as needed  -Cardiac 2 3 GM Sodium, 1 8L fluid restriction   -xarelto and SCDs for DVT prophylaxis      Subjective:   Pt seen and examined at bedside  Pt has improvement of pain today  Pt denies fever, chills, headache, chest pain and abdominal pain  Objective:     Vitals: Blood pressure 100/54, pulse 71, temperature 98 6 °F (37 °C), temperature source Oral, resp  rate 18, height 5' 7" (1 702 m), weight 117 kg (257 lb 4 4 oz), SpO2 93 %  ,Body mass index is 40 3 kg/m²    Wt Readings from Last 3 Encounters:   11/27/17 117 kg (257 lb 4 4 oz)   11/17/17 120 kg (265 lb)   09/01/17 125 kg (276 lb 7 3 oz)       Intake/Output Summary (Last 24 hours) at 11/27/17 0707  Last data filed at 11/26/17 2001   Gross per 24 hour   Intake                0 ml   Output              400 ml   Net             -400 ml       Physical Exam: General appearance: alert, appears stated age and cooperative  Lungs: clear to auscultation bilaterally  Heart: regular rate and rhythm, S1, S2 normal, no murmur, click, rub or gallop  Abdomen: soft, non-tender; bowel sounds normal; no masses,  no organomegaly  Extremities: mild edema present b/l, no cyanosis or sign of trauma     Recent Results (from the past 24 hour(s))   CBC and Platelet    Collection Time: 11/27/17  5:48 AM   Result Value Ref Range    WBC 7 50 4 80 - 10 80 Thousand/uL    RBC 3 78 (L) 4 70 - 6 10 Million/uL    Hemoglobin 11 1 (L) 14 0 - 18 0 g/dL    Hematocrit 33 0 (L) 42 0 - 52 0 %    MCV 88 82 - 98 fL    MCH 29 3 27 0 - 31 0 pg    MCHC 33 5 31 4 - 37 4 g/dL    RDW 12 7 11 6 - 15 1 %    Platelets 449 546 - 006 Thousands/uL    MPV 8 0 (L) 8 9 - 12 7 fL   Basic metabolic panel    Collection Time: 11/27/17  5:48 AM   Result Value Ref Range    Sodium 137 136 - 145 mmol/L    Potassium 4 1 3 5 - 5 3 mmol/L    Chloride 102 100 - 108 mmol/L    CO2 28 21 - 32 mmol/L    Anion Gap 7 4 - 13 mmol/L    BUN 22 5 - 25 mg/dL    Creatinine 1 32 (H) 0 60 - 1 30 mg/dL    Glucose 99 65 - 140 mg/dL    Glucose, Fasting 99 65 - 99 mg/dL    Calcium 8 6 8 3 - 10 1 mg/dL    eGFR 52 ml/min/1 73sq m   Phosphorus    Collection Time: 11/27/17  5:48 AM   Result Value Ref Range    Phosphorus 3 0 2 3 - 4 1 mg/dL   Magnesium    Collection Time: 11/27/17  5:48 AM   Result Value Ref Range    Magnesium 1 9 1 6 - 2 6 mg/dL       Current Facility-Administered Medications   Medication Dose Route Frequency Provider Last Rate Last Dose    acetaminophen (TYLENOL) tablet 650 mg  650 mg Oral Q6H PRN Mortimer Lather, MD   650 mg at 11/26/17 0837    amiodarone tablet 200 mg  200 mg Oral BID Margret Cat MD   200 mg at 11/26/17 1824    aspirin chewable tablet 81 mg  81 mg Oral Daily Margret Cat MD   81 mg at 11/26/17 0842    furosemide (LASIX) tablet 40 mg  40 mg Oral Daily Margret Cat MD   40 mg at 11/26/17 0842    levothyroxine tablet 25 mcg  25 mcg Oral Early Morning Margret Cat MD   25 mcg at 11/27/17 0539    lisinopril (ZESTRIL) tablet 5 mg  5 mg Oral Daily Margret Cat MD   5 mg at 11/26/17 0841    metoprolol (LOPRESSOR) injection 5 mg  5 mg Intravenous Once PRN Luisa Montanez DO        metoprolol succinate (TOPROL-XL) 24 hr tablet 50 mg  50 mg Oral Daily Margret Cat MD   50 mg at 11/26/17 0842    nitroglycerin (NITROSTAT) SL tablet 0 4 mg  0 4 mg Sublingual Q5 Min PRN Margret Cat MD        pantoprazole (PROTONIX) EC tablet 40 mg  40 mg Oral Early Morning Margret Cat MD   40 mg at 11/27/17 0539    pravastatin (PRAVACHOL) tablet 80 mg  80 mg Oral Daily Margret Cat MD   80 mg at 11/26/17 0841    rivaroxaban (XARELTO) tablet 20 mg  20 mg Oral Daily With Breakfast Margret Cat MD   20 mg at 11/26/17 0841    sertraline (ZOLOFT) tablet 50 mg  50 mg Oral Daily Margret Cat MD   50 mg at 11/26/17 0842    sucralfate (CARAFATE) oral suspension 1,000 mg  1,000 mg Oral 4x Daily (AC & HS) Donny Dominguez MD 1,000 mg at 11/26/17 2137    traZODone (DESYREL) tablet 150 mg  150 mg Oral HS Margret Cat MD   150 mg at 11/26/17 2137       Invasive Devices     Peripheral Intravenous Line            Peripheral IV 11/25/17 Left Hand 1 day                Lab, Imaging and other studies: I have personally reviewed pertinent reports  VTE Pharmacologic Prophylaxis: Xeralto   VTE Mechanical Prophylaxis: sequential compression device    Cierra Hoyt DO     SENIOR RESIDENT NOTE: I have personally seen and examined the patient, and agree with the residents assessment  Patient denies any chest pain, shortness breath or palpitations this morning  States that the feeling of indigestion has decreased markedly  Noted to have sinus tachycardia overnight with maximum heart rate 100  Currently NPO for stress test this morning  Will continue current medication, follow up Cardiology    Physical exam as above    Ross Shoemaker MD , MD

## 2017-11-27 NOTE — PLAN OF CARE
CARDIOVASCULAR - ADULT     Maintains optimal cardiac output and hemodynamic stability Progressing     Absence of cardiac dysrhythmias or at baseline rhythm Progressing        DISCHARGE PLANNING     Discharge to home or other facility with appropriate resources Progressing        DISCHARGE PLANNING - CARE MANAGEMENT     Discharge to post-acute care or home with appropriate resources Progressing        GASTROINTESTINAL - ADULT     Minimal or absence of nausea and/or vomiting Progressing        GENITOURINARY - ADULT     Maintains or returns to baseline urinary function Progressing        INFECTION - ADULT     Absence or prevention of progression during hospitalization Progressing        Knowledge Deficit     Patient/family/caregiver demonstrates understanding of disease process, treatment plan, medications, and discharge instructions Progressing        Nutrition/Hydration-ADULT     Nutrient/Hydration intake appropriate for improving, restoring or maintaining nutritional needs Progressing        PAIN - ADULT     Verbalizes/displays adequate comfort level or baseline comfort level Progressing        SAFETY ADULT     Patient will remain free of falls Progressing     Maintain or return to baseline ADL function Progressing     Maintain or return mobility status to optimal level Progressing

## 2017-11-27 NOTE — PROGRESS NOTES
Continued Stay Review    Date: 11/27/17    Vital Signs: /59   Pulse 70   Temp 98 1 °F (36 7 °C) (Oral)   Resp 18   Ht 5' 7" (1 702 m)   Wt 117 kg (257 lb 4 4 oz)   SpO2 92%   BMI 40 30 kg/m²     Medications:   Scheduled Meds:   amiodarone 200 mg Oral BID   aspirin 81 mg Oral Daily   furosemide 40 mg Oral Daily   levothyroxine 25 mcg Oral Early Morning   lisinopril 5 mg Oral Daily   magnesium oxide 400 mg Oral BID   metoprolol succinate 50 mg Oral Daily   pantoprazole 40 mg Oral Early Morning   pravastatin 80 mg Oral Daily   rivaroxaban 20 mg Oral Daily With Breakfast   sertraline 50 mg Oral Daily   sucralfate 1,000 mg Oral 4x Daily (AC & HS)   traZODone 150 mg Oral HS     Continuous Infusions:    PRN Meds:   acetaminophen    metoprolol    nitroglycerin    Abnormal Labs/Diagnostic Results:     Age/Sex: 77 y o  male     Assessment/Plan:      Assessment/Plan:  Chest pain r/o ACS  -trop x3 negative  -per Er, pt arrived in A-flutter, given IV lopressor 5mg x2, lopressor 50mg PO x1, amiodarone 200mg PO x1  -EKG showed wide QRS tachycardia w occasional premature ventricular complexes, Left bundle branch block; repeat EKG pending this AM  -Continue nitrostat 0 4mg q5min PRN  -Continue Aspirin 81mg  -Continue nasal cannula to maintain O2 sat >92%  -Continue telemetry monitoring  -AM labs  K 4 1 , Mg 1 9  -Pt had episode of sinus tach overnight at approx 100bpm  Lopressor 5mg IV was ordered, but was not administered   Pt was sleeping during the episode  -Cardio consult appreciated- pt scheduled to have Stress test done this AM     Hx of A-fib  -episode of A flutter in Er, s/p lopressor 50mg Po, IV lopressor 5mg X2, amiodarone 200mg PO  -Continue Xeralto 15mg BID and amiodarone 200mg BID  -Continue telemetry monitoring     CHF s/p ICD w/ normalization of EF  -stable, follows up with Dr Armando Denny  -Continue metoprolol 50mg daily  -Continue  lisinopril 5mg daily  -Continue furosemide 40mg BID  -Continue to monitor strict I/Os  -daily weights     CAD  -stable  -Continue aspirin 81mg daily     HTN  -stable  -Continue lisinopril 5mg daily and metoprolol 50mg daily     CKD Stage 3  -stable  -baseline Cr 1 5-1 8  -This AM Cr 1 32  - Continue to monitor daily BMP     Esophageal cancer s/p esophageal stent placement  -stable  -follows w/ Dr Emeli Trevino  -Continue protonix 40mg BID  -Continue carafate 10ml qac qhs  -Continue zofran 4mg q6hr PRN     Hypothyroidism  -stable  -TSH 7 178 during this hospitalization  -Continue levothyroxine 25mcg daily     HLD  -stable  -Lipid panel Chol 196   Tri 137   HDL 32      -Continue pravastatin 80mg daily     Anixety  -stable  -Continue zoloft 50mg daily     Insomnia  -stable  -Continue trazadone 150mg qhs     FEN  -Heplock   -replete electrolytes as needed  -Cardiac 2 3 GM Sodium, 1 8L fluid restriction   -xarelto and SCDs for DVT prophylaxis  CP possibly 2/2 stent placement vs  Aflutter  No arrhythmia appreciated on exam this morning  Stress and possible d/c later today if stress normal  CP resolved      CARDIO  Assessment:  1  Chest pain - abnormal stress test with anterior and septal ischemia - discussed with patient in detail  Will perform cardiac catheterization tomorrow  Catheterization will be diagnostic to rule out significant LAD disease as he will likely need to have surgery for esophageal Cancer  2  Atrial flutter - currently in sinus rhythm  He is on amiodarone and metoprolol along with Xarelto  Will hold Xarelto tonight  3   Hypothyroidism  4  Dyslipidemia-pravastatin  5  Esophageal cancer with recent esophageal stent placement  6  Hypertension - continue lisinopril and metoprolol     7  CHF - chronic combined systolic and diastolic dysfunction - S/p BiV ICD   Last EF was 45%

## 2017-11-27 NOTE — PROGRESS NOTES
Progress Note - Cardiology   Randal Wallace Sr  77 y o  male MRN: [id number]  Unit/Bed#: [address] [facility]- Encounter: [id number]  11/27/17  4:47 PM        Assessment:  1  Chest pain - abnormal stress test with anterior and septal ischemia - discussed with patient in detail  Will perform cardiac catheterization tomorrow  Catheterization will be diagnostic to rule out significant LAD disease as he will likely need to have surgery for esophageal Cancer  2  Atrial flutter - currently in sinus rhythm  He is on amiodarone and metoprolol along with Xarelto  Will hold Xarelto tonight  3   Hypothyroidism  4  Dyslipidemia-pravastatin  5  Esophageal cancer with recent esophageal stent placement  6  Hypertension - continue lisinopril and metoprolol  7  CHF - chronic combined systolic and diastolic dysfunction - S/p BiV ICD  Last EF was 45%  Plan:  As above      Subjective: Milton Garcia  denies any chest pain or shortness of breath  No overnight events        Meds/Allergies   current meds:   Current Facility-Administered Medications   Medication Dose Route Frequency    acetaminophen (TYLENOL) tablet 650 mg  650 mg Oral Q6H PRN    amiodarone tablet 200 mg  200 mg Oral BID    aspirin chewable tablet 81 mg  81 mg Oral Daily    furosemide (LASIX) tablet 40 mg  40 mg Oral Daily    levothyroxine tablet 25 mcg  25 mcg Oral Early Morning    lisinopril (ZESTRIL) tablet 5 mg  5 mg Oral Daily    magnesium oxide (MAG-OX) tablet 400 mg  400 mg Oral BID    metoprolol (LOPRESSOR) injection 5 mg  5 mg Intravenous Once PRN    metoprolol succinate (TOPROL-XL) 24 hr tablet 50 mg  50 mg Oral Daily    nitroglycerin (NITROSTAT) SL tablet 0 4 mg  0 4 mg Sublingual Q5 Min PRN    pantoprazole (PROTONIX) EC tablet 40 mg  40 mg Oral Early Morning    pravastatin (PRAVACHOL) tablet 80 mg  80 mg Oral Daily    rivaroxaban (XARELTO) tablet 20 mg  20 mg Oral Daily With Breakfast    sertraline (ZOLOFT) tablet 50 mg  50 mg Oral Daily    sucralfate (CARAFATE) oral suspension 1,000 mg  1,000 mg Oral 4x Daily (AC & HS)    traZODone (DESYREL) tablet 150 mg  150 mg Oral HS     No Known Allergies    Objective   Vitals: Blood pressure 92/55, pulse 70, temperature 98 2 °F (36 8 °C), temperature source Oral, resp  rate 19, height 5' 7" (1 702 m), weight 117 kg (257 lb 4 4 oz), SpO2 93 %  , Body mass index is 40 3 kg/m²  Intake/Output Summary (Last 24 hours) at 11/27/17 1647  Last data filed at 11/27/17 1619   Gross per 24 hour   Intake              120 ml   Output             1000 ml   Net             -880 ml       Physical Exam   Constitutional: He appears healthy  No distress  HENT:   Nose: Nose normal    Mouth/Throat: Oropharynx is clear  Eyes: Conjunctivae are normal  Pupils are equal, round, and reactive to light  Neck: Neck supple  No JVD present  Cardiovascular: Normal rate and regular rhythm  Murmur heard  Systolic murmur is present with a grade of 2/6  at the upper left sternal border  Pulmonary/Chest: Breath sounds normal  He has no wheezes  He has no rales  Abdominal: Soft  He exhibits no distension  There is no tenderness  Musculoskeletal: He exhibits no edema  Neurological: He is alert and oriented to person, place, and time  Skin: Skin is warm and dry  No rash noted         Lab Results:     Troponins:   Results from last 7 days  Lab Units 11/26/17  0615 11/26/17  0054 11/25/17  1909   TROPONIN I ng/mL <0 02 <0 02 <0 02       Recent Results (from the past 24 hour(s))   CBC and Platelet    Collection Time: 11/27/17  5:48 AM   Result Value Ref Range    WBC 7 50 4 80 - 10 80 Thousand/uL    RBC 3 78 (L) 4 70 - 6 10 Million/uL    Hemoglobin 11 1 (L) 14 0 - 18 0 g/dL    Hematocrit 33 0 (L) 42 0 - 52 0 %    MCV 88 82 - 98 fL    MCH 29 3 27 0 - 31 0 pg    MCHC 33 5 31 4 - 37 4 g/dL    RDW 12 7 11 6 - 15 1 %    Platelets 299 671 - 166 Thousands/uL    MPV 8 0 (L) 8 9 - 12 7 fL   Basic metabolic panel    Collection Time: 11/27/17  5:48 AM   Result Value Ref Range    Sodium 137 136 - 145 mmol/L    Potassium 4 1 3 5 - 5 3 mmol/L    Chloride 102 100 - 108 mmol/L    CO2 28 21 - 32 mmol/L    Anion Gap 7 4 - 13 mmol/L    BUN 22 5 - 25 mg/dL    Creatinine 1 32 (H) 0 60 - 1 30 mg/dL    Glucose 99 65 - 140 mg/dL    Glucose, Fasting 99 65 - 99 mg/dL    Calcium 8 6 8 3 - 10 1 mg/dL    eGFR 52 ml/min/1 73sq m   Phosphorus    Collection Time: 11/27/17  5:48 AM   Result Value Ref Range    Phosphorus 3 0 2 3 - 4 1 mg/dL   Magnesium    Collection Time: 11/27/17  5:48 AM   Result Value Ref Range    Magnesium 1 9 1 6 - 2 6 mg/dL   Stress strip    Collection Time: 11/27/17 10:06 AM   Result Value Ref Range    Protocol Name LEXISCAN     Time In Exercise Phase 00:01:00     MAX  SYSTOLIC  mmHg    Max Diastolic Bp 69 mmHg    Max Heart Rate 93 BPM    Max Predicted Heart Rate 145 BPM    Reason for Termination Protocol Complete     Test Indication tachycardia,chest pain     Target Hr Formular (220 - Age)*85%     Arrhy During Ex      ECG Interp Before Ex      ECG Interp during Ex      Ex Summary Comment      Chest Pain Statement none     Overall Hr Response To Exercise      Overall BP Response To Exercise     ECG 12 lead    Collection Time: 11/27/17 10:07 AM   Result Value Ref Range    Ventricular Rate 70 BPM    Atrial Rate 70 BPM    NC Interval 116 ms    QRSD Interval 170 ms    QT Interval 500 ms    QTC Interval 540 ms    P Axis 78 degrees    QRS Axis -84 degrees    T Wave Axis 89 degrees        Cardiac testing:   Nuclear stress test with anterior and septal wall ischemia    EKG: Personally reviewed  Paced rhythm with tachycardia at times  Imaging: I have personally reviewed pertinent reports

## 2017-11-28 ENCOUNTER — APPOINTMENT (OUTPATIENT)
Dept: NON INVASIVE DIAGNOSTICS | Facility: HOSPITAL | Age: 77
End: 2017-11-28
Attending: INTERNAL MEDICINE
Payer: COMMERCIAL

## 2017-11-28 ENCOUNTER — GENERIC CONVERSION - ENCOUNTER (OUTPATIENT)
Dept: OTHER | Facility: OTHER | Age: 77
End: 2017-11-28

## 2017-11-28 LAB
ANION GAP SERPL CALCULATED.3IONS-SCNC: 8 MMOL/L (ref 4–13)
ATRIAL RATE: 119 BPM
ATRIAL RATE: 70 BPM
ATRIAL RATE: 70 BPM
BASOPHILS # BLD AUTO: 0 THOUSANDS/ΜL (ref 0–0.1)
BASOPHILS NFR BLD AUTO: 0 % (ref 0–1)
BUN SERPL-MCNC: 21 MG/DL (ref 5–25)
CALCIUM SERPL-MCNC: 8.5 MG/DL (ref 8.3–10.1)
CHLORIDE SERPL-SCNC: 103 MMOL/L (ref 100–108)
CO2 SERPL-SCNC: 26 MMOL/L (ref 21–32)
CREAT SERPL-MCNC: 1.35 MG/DL (ref 0.6–1.3)
EOSINOPHIL # BLD AUTO: 0.3 THOUSAND/ΜL (ref 0–0.61)
EOSINOPHIL NFR BLD AUTO: 4 % (ref 0–6)
ERYTHROCYTE [DISTWIDTH] IN BLOOD BY AUTOMATED COUNT: 13.5 % (ref 11.6–15.1)
GFR SERPL CREATININE-BSD FRML MDRD: 51 ML/MIN/1.73SQ M
GLUCOSE P FAST SERPL-MCNC: 101 MG/DL (ref 65–99)
GLUCOSE SERPL-MCNC: 101 MG/DL (ref 65–140)
HCT VFR BLD AUTO: 34 % (ref 42–52)
HGB BLD-MCNC: 11.1 G/DL (ref 14–18)
LYMPHOCYTES # BLD AUTO: 1.4 THOUSANDS/ΜL (ref 0.6–4.47)
LYMPHOCYTES NFR BLD AUTO: 17 % (ref 14–44)
MAGNESIUM SERPL-MCNC: 1.9 MG/DL (ref 1.6–2.6)
MCH RBC QN AUTO: 29.2 PG (ref 27–31)
MCHC RBC AUTO-ENTMCNC: 32.6 G/DL (ref 31.4–37.4)
MCV RBC AUTO: 90 FL (ref 82–98)
MONOCYTES # BLD AUTO: 0.4 THOUSAND/ΜL (ref 0.17–1.22)
MONOCYTES NFR BLD AUTO: 6 % (ref 4–12)
NEUTROPHILS # BLD AUTO: 5.9 THOUSANDS/ΜL (ref 1.85–7.62)
NEUTS SEG NFR BLD AUTO: 74 % (ref 43–75)
NRBC BLD AUTO-RTO: 0 /100 WBCS
P AXIS: 78 DEGREES
P AXIS: 89 DEGREES
PHOSPHATE SERPL-MCNC: 3.1 MG/DL (ref 2.3–4.1)
PLATELET # BLD AUTO: 303 THOUSANDS/UL (ref 130–400)
PMV BLD AUTO: 7.3 FL (ref 8.9–12.7)
POTASSIUM SERPL-SCNC: 4 MMOL/L (ref 3.5–5.3)
PR INTERVAL: 116 MS
PR INTERVAL: 134 MS
QRS AXIS: -62 DEGREES
QRS AXIS: -84 DEGREES
QRS AXIS: -89 DEGREES
QRSD INTERVAL: 170 MS
QRSD INTERVAL: 170 MS
QRSD INTERVAL: 212 MS
QT INTERVAL: 372 MS
QT INTERVAL: 500 MS
QT INTERVAL: 566 MS
QTC INTERVAL: 540 MS
QTC INTERVAL: 558 MS
QTC INTERVAL: 611 MS
RBC # BLD AUTO: 3.8 MILLION/UL (ref 4.7–6.1)
SODIUM SERPL-SCNC: 137 MMOL/L (ref 136–145)
T WAVE AXIS: 114 DEGREES
T WAVE AXIS: 86 DEGREES
T WAVE AXIS: 89 DEGREES
VENTRICULAR RATE: 135 BPM
VENTRICULAR RATE: 70 BPM
VENTRICULAR RATE: 70 BPM
WBC # BLD AUTO: 8.1 THOUSAND/UL (ref 4.8–10.8)

## 2017-11-28 PROCEDURE — C1769 GUIDE WIRE: HCPCS

## 2017-11-28 PROCEDURE — 84100 ASSAY OF PHOSPHORUS: CPT | Performed by: STUDENT IN AN ORGANIZED HEALTH CARE EDUCATION/TRAINING PROGRAM

## 2017-11-28 PROCEDURE — 93458 L HRT ARTERY/VENTRICLE ANGIO: CPT

## 2017-11-28 PROCEDURE — 83735 ASSAY OF MAGNESIUM: CPT | Performed by: STUDENT IN AN ORGANIZED HEALTH CARE EDUCATION/TRAINING PROGRAM

## 2017-11-28 PROCEDURE — 85025 COMPLETE CBC W/AUTO DIFF WBC: CPT | Performed by: STUDENT IN AN ORGANIZED HEALTH CARE EDUCATION/TRAINING PROGRAM

## 2017-11-28 PROCEDURE — 80048 BASIC METABOLIC PNL TOTAL CA: CPT | Performed by: STUDENT IN AN ORGANIZED HEALTH CARE EDUCATION/TRAINING PROGRAM

## 2017-11-28 PROCEDURE — C1894 INTRO/SHEATH, NON-LASER: HCPCS

## 2017-11-28 RX ORDER — HEPARIN SODIUM 1000 [USP'U]/ML
INJECTION, SOLUTION INTRAVENOUS; SUBCUTANEOUS CODE/TRAUMA/SEDATION MEDICATION
Status: COMPLETED | OUTPATIENT
Start: 2017-11-28 | End: 2017-11-28

## 2017-11-28 RX ORDER — FENTANYL CITRATE 50 UG/ML
INJECTION, SOLUTION INTRAMUSCULAR; INTRAVENOUS CODE/TRAUMA/SEDATION MEDICATION
Status: COMPLETED | OUTPATIENT
Start: 2017-11-28 | End: 2017-11-28

## 2017-11-28 RX ORDER — MIDAZOLAM HYDROCHLORIDE 1 MG/ML
INJECTION INTRAMUSCULAR; INTRAVENOUS CODE/TRAUMA/SEDATION MEDICATION
Status: COMPLETED | OUTPATIENT
Start: 2017-11-28 | End: 2017-11-28

## 2017-11-28 RX ORDER — VERAPAMIL HCL 2.5 MG/ML
AMPUL (ML) INTRAVENOUS CODE/TRAUMA/SEDATION MEDICATION
Status: COMPLETED | OUTPATIENT
Start: 2017-11-28 | End: 2017-11-28

## 2017-11-28 RX ORDER — NITROGLYCERIN 20 MG/100ML
INJECTION INTRAVENOUS CODE/TRAUMA/SEDATION MEDICATION
Status: COMPLETED | OUTPATIENT
Start: 2017-11-28 | End: 2017-11-28

## 2017-11-28 RX ORDER — LIDOCAINE HYDROCHLORIDE 10 MG/ML
INJECTION, SOLUTION INFILTRATION; PERINEURAL CODE/TRAUMA/SEDATION MEDICATION
Status: COMPLETED | OUTPATIENT
Start: 2017-11-28 | End: 2017-11-28

## 2017-11-28 RX ADMIN — FENTANYL CITRATE 25 MCG: 50 INJECTION, SOLUTION INTRAMUSCULAR; INTRAVENOUS at 11:50

## 2017-11-28 RX ADMIN — ASPIRIN 81 MG 81 MG: 81 TABLET ORAL at 08:46

## 2017-11-28 RX ADMIN — SERTRALINE HYDROCHLORIDE 50 MG: 50 TABLET ORAL at 08:46

## 2017-11-28 RX ADMIN — SUCRALFATE 1000 MG: 1 SUSPENSION ORAL at 17:02

## 2017-11-28 RX ADMIN — PRAVASTATIN SODIUM 80 MG: 80 TABLET ORAL at 08:46

## 2017-11-28 RX ADMIN — SUCRALFATE 1000 MG: 1 SUSPENSION ORAL at 06:08

## 2017-11-28 RX ADMIN — HEPARIN SODIUM 2000 UNITS: 1000 INJECTION INTRAVENOUS; SUBCUTANEOUS at 11:58

## 2017-11-28 RX ADMIN — PANTOPRAZOLE SODIUM 40 MG: 40 TABLET, DELAYED RELEASE ORAL at 05:26

## 2017-11-28 RX ADMIN — AMIODARONE HYDROCHLORIDE 200 MG: 200 TABLET ORAL at 18:37

## 2017-11-28 RX ADMIN — MIDAZOLAM HYDROCHLORIDE 0.5 MG: 1 INJECTION, SOLUTION INTRAMUSCULAR; INTRAVENOUS at 11:50

## 2017-11-28 RX ADMIN — TRAZODONE HYDROCHLORIDE 150 MG: 50 TABLET ORAL at 21:15

## 2017-11-28 RX ADMIN — IODIXANOL 150 ML: 320 INJECTION, SOLUTION INTRAVASCULAR at 14:09

## 2017-11-28 RX ADMIN — VERAPAMIL HYDROCHLORIDE 2.5 MG: 2.5 INJECTION, SOLUTION INTRAVENOUS at 11:58

## 2017-11-28 RX ADMIN — NITROGLYCERIN 200 MCG: 20 INJECTION INTRAVENOUS at 12:45

## 2017-11-28 RX ADMIN — LEVOTHYROXINE SODIUM 25 MCG: 25 TABLET ORAL at 05:26

## 2017-11-28 RX ADMIN — SUCRALFATE 1000 MG: 1 SUSPENSION ORAL at 21:15

## 2017-11-28 RX ADMIN — NITROGLYCERIN 200 MCG: 20 INJECTION INTRAVENOUS at 11:58

## 2017-11-28 RX ADMIN — FUROSEMIDE 40 MG: 40 TABLET ORAL at 08:46

## 2017-11-28 RX ADMIN — AMIODARONE HYDROCHLORIDE 200 MG: 200 TABLET ORAL at 08:48

## 2017-11-28 RX ADMIN — LIDOCAINE HYDROCHLORIDE 1 ML: 10 INJECTION, SOLUTION INFILTRATION; PERINEURAL at 11:53

## 2017-11-28 NOTE — PROGRESS NOTES
Progress Note - Cardiology   Fabiola Jojo   77 y o  male MRN: [id number]  Unit/Bed#: [address] [address]- Encounter: [id number]  11/28/17  6:44 PM        Assessment:  1  Chest pain - catheterization showed circumflex disease  On stress test he had anterior and septal wall defect  There was no ischemia in circumflex territory  Will continue to manage medically  Will defer percutaneous intervention at this time as patient may require surgery for esophageal cancer  2  Atrial flutter - currently in sinus rhythm  He is on amiodarone and metoprolol along with Xarelto  May resume Xarelto  3   Hypothyroidism  4  Dyslipidemia-pravastatin  5  Esophageal cancer with recent esophageal stent placement  6  Hypertension - continue lisinopril and metoprolol  7  CHF - chronic combined systolic and diastolic dysfunction - S/p BiV ICD  Last EF was 45%  Plan:  As above      Subjective: Lynn Bain  denies any chest pain or shortness of breath  No overnight events        Meds/Allergies   current meds:   Current Facility-Administered Medications   Medication Dose Route Frequency    acetaminophen (TYLENOL) tablet 650 mg  650 mg Oral Q6H PRN    amiodarone tablet 200 mg  200 mg Oral BID    aspirin chewable tablet 81 mg  81 mg Oral Daily    furosemide (LASIX) tablet 40 mg  40 mg Oral Daily    levothyroxine tablet 25 mcg  25 mcg Oral Early Morning    lisinopril (ZESTRIL) tablet 5 mg  5 mg Oral Daily    metoprolol (LOPRESSOR) injection 5 mg  5 mg Intravenous Once PRN    metoprolol succinate (TOPROL-XL) 24 hr tablet 50 mg  50 mg Oral Daily    nitroglycerin (NITROSTAT) SL tablet 0 4 mg  0 4 mg Sublingual Q5 Min PRN    pantoprazole (PROTONIX) EC tablet 40 mg  40 mg Oral Early Morning    pravastatin (PRAVACHOL) tablet 80 mg  80 mg Oral Daily    sertraline (ZOLOFT) tablet 50 mg  50 mg Oral Daily    sucralfate (CARAFATE) oral suspension 1,000 mg  1,000 mg Oral 4x Daily (AC & HS)    traZODone (DESYREL) tablet 150 mg  150 mg Oral HS     No Known Allergies    Objective   Vitals: Blood pressure 125/58, pulse 72, temperature 97 5 °F (36 4 °C), resp  rate 18, height 5' 7" (1 702 m), weight 116 kg (256 lb 13 4 oz), SpO2 93 %  , Body mass index is 40 23 kg/m²  Intake/Output Summary (Last 24 hours) at 11/28/17 1844  Last data filed at 11/28/17 1501   Gross per 24 hour   Intake              250 ml   Output              700 ml   Net             -450 ml       Physical Exam   Constitutional: He appears healthy  No distress  HENT:   Nose: Nose normal    Mouth/Throat: Oropharynx is clear  Eyes: Conjunctivae are normal  Pupils are equal, round, and reactive to light  Neck: Neck supple  No JVD present  Cardiovascular: Normal rate and regular rhythm  Exam reveals no distant heart sounds and no friction rub  No murmur heard  Pulmonary/Chest: Effort normal and breath sounds normal  He has no wheezes  He has no rales  Abdominal: Soft  He exhibits no distension  There is no tenderness  Musculoskeletal: He exhibits no edema  Neurological: He is alert and oriented to person, place, and time  Skin: Skin is warm and dry  No rash noted         Lab Results:     Troponins:     Results from last 7 days  Lab Units 11/26/17  0615 11/26/17  0054 11/25/17  1909   TROPONIN I ng/mL <0 02 <0 02 <0 02       Recent Results (from the past 24 hour(s))   CBC and differential    Collection Time: 11/28/17  5:24 AM   Result Value Ref Range    WBC 8 10 4 80 - 10 80 Thousand/uL    RBC 3 80 (L) 4 70 - 6 10 Million/uL    Hemoglobin 11 1 (L) 14 0 - 18 0 g/dL    Hematocrit 34 0 (L) 42 0 - 52 0 %    MCV 90 82 - 98 fL    MCH 29 2 27 0 - 31 0 pg    MCHC 32 6 31 4 - 37 4 g/dL    RDW 13 5 11 6 - 15 1 %    MPV 7 3 (L) 8 9 - 12 7 fL    Platelets 804 620 - 215 Thousands/uL    nRBC 0 /100 WBCs    Neutrophils Relative 74 43 - 75 %    Lymphocytes Relative 17 14 - 44 %    Monocytes Relative 6 4 - 12 %    Eosinophils Relative 4 0 - 6 %    Basophils Relative 0 0 - 1 %    Neutrophils Absolute 5 90 1 85 - 7 62 Thousands/µL    Lymphocytes Absolute 1 40 0 60 - 4 47 Thousands/µL    Monocytes Absolute 0 40 0 17 - 1 22 Thousand/µL    Eosinophils Absolute 0 30 0 00 - 0 61 Thousand/µL    Basophils Absolute 0 00 0 00 - 0 10 Thousands/µL   Basic metabolic panel    Collection Time: 11/28/17  5:24 AM   Result Value Ref Range    Sodium 137 136 - 145 mmol/L    Potassium 4 0 3 5 - 5 3 mmol/L    Chloride 103 100 - 108 mmol/L    CO2 26 21 - 32 mmol/L    Anion Gap 8 4 - 13 mmol/L    BUN 21 5 - 25 mg/dL    Creatinine 1 35 (H) 0 60 - 1 30 mg/dL    Glucose 101 65 - 140 mg/dL    Glucose, Fasting 101 (H) 65 - 99 mg/dL    Calcium 8 5 8 3 - 10 1 mg/dL    eGFR 51 ml/min/1 73sq m   Magnesium    Collection Time: 11/28/17  5:24 AM   Result Value Ref Range    Magnesium 1 9 1 6 - 2 6 mg/dL   Phosphorus    Collection Time: 11/28/17  5:24 AM   Result Value Ref Range    Phosphorus 3 1 2 3 - 4 1 mg/dL        Cardiac testing:   Nuclear stress test with anterior and septal wall ischemia    EKG: Personally reviewed  Paced rhythm with tachycardia at times  Imaging: I have personally reviewed pertinent reports

## 2017-11-28 NOTE — PROCEDURES
Procedures  Cardiac Catheterization Operative Report    Raven Lopez  [id number]  11/28/2017  Nataly De La Cruz DO    Indication: Cardiomyopathy  Procedure: L Heart Catheterization                     Ventriculogram                     Coronary Angiogram    Procedure Details  The risks, benefits, complications, treatment options, and expected outcomes were discussed with the patient  The patient and/or family concurred with the proposed plan, giving informed consent  Patient was brought to the cath lab after IV hydration was begun and oral premedication was given  He was further sedated with fentanyl and midazolam  He was prepped and draped in the usual manner  Using the modified Seldinger access technique, a 5 [place] sheath was placed in the radial artery  Heparin was administered  A left heart catheterization and ventriculogram was completed with a JR4 catheter after the JR4 catheter across the aortic valve  There was difficulty in cannulating the right coronary unable to cannulate with a JR4, JR3 5, and engagement successfully with a AR1  A tiger catheter was used to cannulate the left coronary artery  After the procedure was completed, sedation was stopped and the sheaths and catheters were all removed  Hemostasis was achieved with manual pressure  Findings:    Hemodynamics   LVEDP 19mmHg, no gradient on pullback   Left Main  short caliber widely patent   RCA   30% proximal disease   LAD   Mild luminal regularities   Circ    50-60%% mid-cx    LV  Normal LV size, Mild diffuse hypokinesis 40-45% grossly       Complications  None     Estimated Blood Loss:  Minimal         Complications:  None; patient tolerated the procedure well             Disposition: hemodynamically stable and PACU - hemodynamically stable           Condition: stable    A/P Mild- moderate diffuse disease including 50-60% cx disease         Mildly elevated filling pressures  -- TR band 2 hours

## 2017-11-28 NOTE — PROGRESS NOTES
[facility] Practice Progress Note - Valentine Cord  77 y o  male MRN: [id number]    Unit/Bed#: [address] Encounter: [id number]      Assessment/Plan:  Chest pain r/o ACS  - Trop x3 negative  - per Er, pt arrived in A-flutter, given IV lopressor 5mg x2, lopressor 50mg PO x1, amiodarone 200mg PO x1  - EKG on presentation showed wide QRS tachycardia w occasional premature ventricular complexes, Left bundle branch block; Repeat EKG   - Continue nitrostat 0 4mg q5min PRN  - Continue Aspirin 81mg  - Continue nasal cannula to maintain O2 sat >92%  - Continue telemetry monitoring  - AM labs  K 4 0 , Mg 1 9  - Pt had a repeat episode of sinus tach around 5 AM, for approximately 9 seconds  As per nurse, patient was in the bathroom at this time and episode resolved with patient returned to his bed  No medication was administered at the time  - Cardio consult appreciated  - Stress test 11/27 abnormal with anterior and septal ischemia    Catheterization scheduled for this morning     Hx of A-fib  - Episode of A flutter in Er, s/p lopressor 50mg Po, IV lopressor 5mg X2, amiodarone 200mg PO  - Continue amiodarone 200mg BID  - As per Dr Ghazal Fuentes, will hold Xeralto 15mg BID   - Continue telemetry monitoring     CHF s/p ICD w/ normalization of EF  -stable, follows up with Dr Ghazal uFentes  -Continue metoprolol 50mg daily  -Continue  lisinopril 5mg daily  -Continue furosemide 40mg BID  -Continue to monitor strict I/Os  -daily weights     CAD  -stable  -Continue aspirin 81mg daily     HTN  -stable  -Continue lisinopril 5mg daily and metoprolol 50mg daily     CKD Stage 3  -stable  -baseline Cr 1 5-1 8  -This AM Cr 1 35  - Continue to monitor daily BMP     Esophageal cancer s/p esophageal stent placement  -stable  -follows w/ Dr Andrea Mitchell  -Continue protonix 40mg BID, Carafate 10ml qac qhs, and Zofran 4mg q6hr PRN     Hypothyroidism  -stable  -TSH 7 178 during this hospitalization  -Continue levothyroxine 25mcg daily     HLD  -stable  -Lipid panel Chol 196   Tri 137   HDL 32      -Continue pravastatin 80mg daily     Anixety  -stable  -Continue zoloft 50mg daily     Insomnia  -stable  -Continue trazadone 150mg qhs     FEN  -Heplock   -replete electrolytes as needed  -NPO  -SCDs for DVT prophylaxis      Subjective:   Patient seen and examined at bedside  Patient states that he has significant improvement of chest pain this morning  Patient denies fever, chills, headache, shortness of breath or urinary changes  Objective:     Vitals: Blood pressure 134/76, pulse 70, temperature 98 2 °F (36 8 °C), temperature source Tympanic, resp  rate 18, height 5' 7" (1 702 m), weight 116 kg (256 lb 13 4 oz), SpO2 95 %  ,Body mass index is 40 23 kg/m²  Wt Readings from Last 3 Encounters:   11/28/17 116 kg (256 lb 13 4 oz)   11/17/17 120 kg (265 lb)   09/01/17 125 kg (276 lb 7 3 oz)       Intake/Output Summary (Last 24 hours) at 11/28/17 0645  Last data filed at 11/28/17 0272   Gross per 24 hour   Intake              120 ml   Output             1300 ml   Net            -1180 ml       Physical Exam: General appearance: alert, appears stated age and cooperative  Lungs: clear to auscultation bilaterally  Heart: regular rate and rhythm, S1, S2 normal, no murmur, click, rub or gallop  Abdomen: soft, non-tender; bowel sounds normal; no masses,  no organomegaly  Extremities: Mild edema lower extremities bilaterally, no cyanosis atraumatic     Recent Results (from the past 24 hour(s))   Stress strip    Collection Time: 11/27/17 10:06 AM   Result Value Ref Range    [facility] Name [facility]     Time In Exercise Phase 00:01:00     MAX   SYSTOLIC  mmHg    Max Diastolic Bp 69 mmHg    Max Heart Rate 93 BPM    Max Predicted Heart Rate 145 BPM    Reason for Termination Protocol Complete     Test Indication tachycardia,chest pain     Target Hr Formular (220 - Age)*85%     Arrhy During Ex      ECG Interp Before Ex      ECG Interp during Ex Ex Summary Comment      Chest Pain Statement none     Overall Hr Response To Exercise      Overall BP Response To Exercise     ECG 12 lead    Collection Time: 11/27/17 10:07 AM   Result Value Ref Range    Ventricular Rate 70 BPM    Atrial Rate 70 BPM    NE Interval 116 ms    QRSD Interval 170 ms    QT Interval 500 ms    QTC Interval 540 ms    P Axis 78 degrees    QRS Axis -84 degrees    T Wave Axis 89 degrees   CBC and differential    Collection Time: 11/28/17  5:24 AM   Result Value Ref Range    WBC 8 10 4 80 - 10 80 Thousand/uL    RBC 3 80 (L) 4 70 - 6 10 Million/uL    Hemoglobin 11 1 (L) 14 0 - 18 0 g/dL    Hematocrit 34 0 (L) 42 0 - 52 0 %    MCV 90 82 - 98 fL    MCH 29 2 27 0 - 31 0 pg    MCHC 32 6 31 4 - 37 4 g/dL    RDW 13 5 11 6 - 15 1 %    MPV 7 3 (L) 8 9 - 12 7 fL    Platelets 375 848 - 996 Thousands/uL    nRBC 0 /100 WBCs    Neutrophils Relative 74 43 - 75 %    Lymphocytes Relative 17 14 - 44 %    Monocytes Relative 6 4 - 12 %    Eosinophils Relative 4 0 - 6 %    Basophils Relative 0 0 - 1 %    Neutrophils Absolute 5 90 1 85 - 7 62 Thousands/µL    Lymphocytes Absolute 1 40 0 60 - 4 47 Thousands/µL    Monocytes Absolute 0 40 0 17 - 1 22 Thousand/µL    Eosinophils Absolute 0 30 0 00 - 0 61 Thousand/µL    Basophils Absolute 0 00 0 00 - 0 10 Thousands/µL   Basic metabolic panel    Collection Time: 11/28/17  5:24 AM   Result Value Ref Range    Sodium 137 136 - 145 mmol/L    Potassium 4 0 3 5 - 5 3 mmol/L    Chloride 103 100 - 108 mmol/L    CO2 26 21 - 32 mmol/L    Anion Gap 8 4 - 13 mmol/L    BUN 21 5 - 25 mg/dL    Creatinine 1 35 (H) 0 60 - 1 30 mg/dL    Glucose 101 65 - 140 mg/dL    Glucose, Fasting 101 (H) 65 - 99 mg/dL    Calcium 8 5 8 3 - 10 1 mg/dL    eGFR 51 ml/min/1 73sq m   Magnesium    Collection Time: 11/28/17  5:24 AM   Result Value Ref Range    Magnesium 1 9 1 6 - 2 6 mg/dL   Phosphorus    Collection Time: 11/28/17  5:24 AM   Result Value Ref Range    Phosphorus 3 1 2 3 - 4 1 mg/dL       Current Facility-Administered Medications   Medication Dose Route Frequency Provider Last Rate Last Dose    acetaminophen (TYLENOL) tablet 650 mg  650 mg Oral Q6H PRN Rosalino Seymour MD   650 mg at 11/26/17 0837    amiodarone tablet 200 mg  200 mg Oral BID Margret Cat MD   200 mg at 11/27/17 1816    aspirin chewable tablet 81 mg  81 mg Oral Daily Margret Cat MD   81 mg at 11/27/17 0853    furosemide (LASIX) tablet 40 mg  40 mg Oral Daily Margret Cat MD   40 mg at 11/27/17 0853    levothyroxine tablet 25 mcg  25 mcg Oral Early Morning Margret Cat MD   25 mcg at 11/28/17 0526    lisinopril (ZESTRIL) tablet 5 mg  5 mg Oral Daily Margret Cat MD   5 mg at 11/27/17 0853    metoprolol (LOPRESSOR) injection 5 mg  5 mg Intravenous Once PRN Uma Rod DO        metoprolol succinate (TOPROL-XL) 24 hr tablet 50 mg  50 mg Oral Daily Margret Cat MD   50 mg at 11/27/17 0852    nitroglycerin (NITROSTAT) SL tablet 0 4 mg  0 4 mg Sublingual Q5 Min PRN Margret Cat MD        pantoprazole (PROTONIX) EC tablet 40 mg  40 mg Oral Early Morning Margret Cat MD   40 mg at 11/28/17 0526    pravastatin (PRAVACHOL) tablet 80 mg  80 mg Oral Daily Margret Cat MD   80 mg at 11/27/17 0856    sertraline (ZOLOFT) tablet 50 mg  50 mg Oral Daily Margret Cat MD   50 mg at 11/27/17 0853    sucralfate (CARAFATE) oral suspension 1,000 mg  1,000 mg Oral 4x Daily (AC & HS) Margret Cat MD   1,000 mg at 11/28/17 0608    traZODone (DESYREL) tablet 150 mg  150 mg Oral HS Margret Cat MD   150 mg at 11/27/17 2148       Invasive Devices     Peripheral Intravenous Line            Peripheral IV 11/25/17 Left Hand 2 days                Lab, Imaging and other studies: I have personally reviewed pertinent reports      VTE Pharmacologic Prophylaxis: Sequential compression device (Venodyne)   VTE Mechanical Prophylaxis: sequential compression device    Kiya Serrano DO

## 2017-11-29 VITALS
SYSTOLIC BLOOD PRESSURE: 113 MMHG | BODY MASS INDEX: 40.1 KG/M2 | RESPIRATION RATE: 18 BRPM | DIASTOLIC BLOOD PRESSURE: 57 MMHG | HEART RATE: 70 BPM | WEIGHT: 255.51 LBS | TEMPERATURE: 97.8 F | HEIGHT: 67 IN | OXYGEN SATURATION: 92 %

## 2017-11-29 LAB
ANION GAP SERPL CALCULATED.3IONS-SCNC: 6 MMOL/L (ref 4–13)
BUN SERPL-MCNC: 19 MG/DL (ref 5–25)
CALCIUM SERPL-MCNC: 8.6 MG/DL (ref 8.3–10.1)
CHLORIDE SERPL-SCNC: 103 MMOL/L (ref 100–108)
CO2 SERPL-SCNC: 28 MMOL/L (ref 21–32)
CREAT SERPL-MCNC: 1.49 MG/DL (ref 0.6–1.3)
ERYTHROCYTE [DISTWIDTH] IN BLOOD BY AUTOMATED COUNT: 13.4 % (ref 11.6–15.1)
GFR SERPL CREATININE-BSD FRML MDRD: 45 ML/MIN/1.73SQ M
GLUCOSE SERPL-MCNC: 124 MG/DL (ref 65–140)
HCT VFR BLD AUTO: 34.2 % (ref 42–52)
HGB BLD-MCNC: 11.2 G/DL (ref 14–18)
MAGNESIUM SERPL-MCNC: 2 MG/DL (ref 1.6–2.6)
MCH RBC QN AUTO: 29.2 PG (ref 27–31)
MCHC RBC AUTO-ENTMCNC: 32.8 G/DL (ref 31.4–37.4)
MCV RBC AUTO: 89 FL (ref 82–98)
PHOSPHATE SERPL-MCNC: 2.9 MG/DL (ref 2.3–4.1)
PLATELET # BLD AUTO: 303 THOUSANDS/UL (ref 130–400)
PMV BLD AUTO: 7.4 FL (ref 8.9–12.7)
POTASSIUM SERPL-SCNC: 3.8 MMOL/L (ref 3.5–5.3)
RBC # BLD AUTO: 3.84 MILLION/UL (ref 4.7–6.1)
SODIUM SERPL-SCNC: 137 MMOL/L (ref 136–145)
WBC # BLD AUTO: 7.7 THOUSAND/UL (ref 4.8–10.8)

## 2017-11-29 PROCEDURE — 85027 COMPLETE CBC AUTOMATED: CPT | Performed by: STUDENT IN AN ORGANIZED HEALTH CARE EDUCATION/TRAINING PROGRAM

## 2017-11-29 PROCEDURE — 80048 BASIC METABOLIC PNL TOTAL CA: CPT | Performed by: STUDENT IN AN ORGANIZED HEALTH CARE EDUCATION/TRAINING PROGRAM

## 2017-11-29 PROCEDURE — 84100 ASSAY OF PHOSPHORUS: CPT | Performed by: STUDENT IN AN ORGANIZED HEALTH CARE EDUCATION/TRAINING PROGRAM

## 2017-11-29 PROCEDURE — 83735 ASSAY OF MAGNESIUM: CPT | Performed by: STUDENT IN AN ORGANIZED HEALTH CARE EDUCATION/TRAINING PROGRAM

## 2017-11-29 RX ORDER — SUCRALFATE ORAL 1 G/10ML
1000 SUSPENSION ORAL
Qty: 420 ML | Refills: 0 | Status: SHIPPED | OUTPATIENT
Start: 2017-11-29

## 2017-11-29 RX ADMIN — LEVOTHYROXINE SODIUM 25 MCG: 25 TABLET ORAL at 05:28

## 2017-11-29 RX ADMIN — AMIODARONE HYDROCHLORIDE 200 MG: 200 TABLET ORAL at 09:58

## 2017-11-29 RX ADMIN — SERTRALINE HYDROCHLORIDE 50 MG: 50 TABLET ORAL at 09:57

## 2017-11-29 RX ADMIN — ACETAMINOPHEN 650 MG: 325 TABLET, FILM COATED ORAL at 00:11

## 2017-11-29 RX ADMIN — PANTOPRAZOLE SODIUM 40 MG: 40 TABLET, DELAYED RELEASE ORAL at 05:28

## 2017-11-29 RX ADMIN — ASPIRIN 81 MG 81 MG: 81 TABLET ORAL at 09:58

## 2017-11-29 RX ADMIN — SUCRALFATE 1000 MG: 1 SUSPENSION ORAL at 11:42

## 2017-11-29 RX ADMIN — METOPROLOL SUCCINATE 50 MG: 50 TABLET, FILM COATED, EXTENDED RELEASE ORAL at 09:58

## 2017-11-29 RX ADMIN — LISINOPRIL 5 MG: 5 TABLET ORAL at 09:58

## 2017-11-29 RX ADMIN — FUROSEMIDE 40 MG: 40 TABLET ORAL at 09:57

## 2017-11-29 RX ADMIN — SUCRALFATE 1000 MG: 1 SUSPENSION ORAL at 06:24

## 2017-11-29 RX ADMIN — PRAVASTATIN SODIUM 80 MG: 80 TABLET ORAL at 09:57

## 2017-11-29 NOTE — DISCHARGE INSTRUCTIONS
You have an appointment scheduled at [facility] on December 12th of 14:45 with Dr Daune Essex  A requisition for a repeat TSH will be given to you to be done in 1 month  Call and schedule an appointment for follow-up with your cardiologist, Dr Jorge Luis Riggins in 2 weeks  Call and schedule an appointment with your gastroenterologist, Dr True Blanchard in 1-2 weeks  Resume your home medications  Repeat a TSH in 1 month  If you develop any chest pains, shortness of breath, dizziness or palpitations please return to the ER

## 2017-11-29 NOTE — PROGRESS NOTES
[facility] Progress Note - Latisha Martinez  77 y o  male MRN: [id number]    Unit/Bed#: [address] [place] [address] Encounter: [id number]      Assessment/Plan:  Chest pain r/o ACS  - Trop x3 negative  - per Er, pt arrived in A-flutter, given IV lopressor 5mg x2, lopressor 50mg PO x1, amiodarone 200mg PO x1  - EKG on presentation showed wide QRS tachycardia w occasional premature ventricular complexes, Left bundle branch block; Repeat EKG   - Continue nitrostat 0 4mg q5min PRN  - Continue Aspirin 81mg  - Continue nasal cannula to maintain O2 sat >92%  - Continue telemetry monitoring  - AM labs  K 3 8 , Mg 2 0  - Pt had a repeat episode of sinus tach overnight again, for approximately 9 seconds  As per nurse, the patient was laying comfortably in bed and did not have any signs of distress  No medication was administered at the time  - Cardio consult appreciated  - Stress test 11/27 abnormal with anterior and septal ischemia    Catheterization yesterday showed circumflex artery disease, however no intervention will be done at this time       Hx of A-fib  - Episode of A flutter in Er, s/p lopressor 50mg Po, IV lopressor 5mg X2, amiodarone 200mg PO  - Continue amiodarone 200mg BID  - As per Dr Nancy Delong, will hold Xeralto 15mg BID   - Continue telemetry monitoring     CHF s/p ICD w/ normalization of EF  -stable, follows up with Dr Nancy Delong  -Continue metoprolol 50mg daily  -Continue  lisinopril 5mg daily  -Continue furosemide 40mg BID  -Continue to monitor strict I/Os  -daily weights     CAD  -stable  -Continue aspirin 81mg daily     HTN  -stable  -Continue lisinopril 5mg daily and metoprolol 50mg daily     CKD Stage 3  -stable  -baseline Cr 1 5-1 8  -This AM Cr 1 49  - Continue to monitor daily BMP     Esophageal cancer s/p esophageal stent placement  -stable  -follows w/ Dr Julius Ramirez  -Continue protonix 40mg BID, Carafate 10ml qac qhs, and Zofran 4mg q6hr PRN     Hypothyroidism  -stable  -TSH 7 178 during this hospitalization  -Continue levothyroxine 25mcg daily     HLD  -stable  -Lipid panel Chol 196   Tri 137   HDL 32      -Continue pravastatin 80mg daily     Anixety  -stable  -Continue zoloft 50mg daily     Insomnia  -stable  -Continue trazadone 150mg qhs     FEN  -Heplock   -replete electrolytes as needed  -Dario/cho controlled diet with 1 8L fluid restriction   -SCDs for DVT prophylaxis         Subjective:   Patient seen and examined at bedside  Patient has improvement of chest pain  According to the nurse, patient had episode of 9 second V-tach overnight  Patient does not recall this event as he was sleeping  Patient has regular bowel movements  Patient denies fever, chills, shortness of breath, chest pain, and urinary changes  Objective:     Vitals: Blood pressure 116/58, pulse 70, temperature 98 6 °F (37 °C), temperature source Oral, resp  rate 18, height 5' 7" (1 702 m), weight 116 kg (255 lb 8 2 oz), SpO2 90 %  ,Body mass index is 40 02 kg/m²    Wt Readings from Last 3 Encounters:   11/29/17 116 kg (255 lb 8 2 oz)   11/17/17 120 kg (265 lb)   09/01/17 125 kg (276 lb 7 3 oz)       Intake/Output Summary (Last 24 hours) at 11/29/17 0649  Last data filed at 11/29/17 1029   Gross per 24 hour   Intake              250 ml   Output             1075 ml   Net             -825 ml       Physical Exam: General appearance: alert, appears stated age and cooperative  Lungs: clear to auscultation bilaterally  Heart: regular rate and rhythm, S1, S2 normal, no murmur, click, rub or gallop  Abdomen: soft, non-tender; bowel sounds normal; no masses,  no organomegaly  Extremities: Mild edema bilateral, trauma, no erythema     Recent Results (from the past 24 hour(s))   Basic metabolic panel    Collection Time: 11/29/17  5:37 AM   Result Value Ref Range    Sodium 137 136 - 145 mmol/L    Potassium 3 8 3 5 - 5 3 mmol/L    Chloride 103 100 - 108 mmol/L    CO2 28 21 - 32 mmol/L    Anion Gap 6 4 - 13 mmol/L    BUN 19 5 - 25 mg/dL    Creatinine 1 49 (H) 0 60 - 1 30 mg/dL    Glucose 124 65 - 140 mg/dL    Calcium 8 6 8 3 - 10 1 mg/dL    eGFR 45 ml/min/1 73sq m   CBC and Platelet    Collection Time: 11/29/17  5:37 AM   Result Value Ref Range    WBC 7 70 4 80 - 10 80 Thousand/uL    RBC 3 84 (L) 4 70 - 6 10 Million/uL    Hemoglobin 11 2 (L) 14 0 - 18 0 g/dL    Hematocrit 34 2 (L) 42 0 - 52 0 %    MCV 89 82 - 98 fL    MCH 29 2 27 0 - 31 0 pg    MCHC 32 8 31 4 - 37 4 g/dL    RDW 13 4 11 6 - 15 1 %    Platelets 312 016 - 486 Thousands/uL    MPV 7 4 (L) 8 9 - 12 7 fL   Phosphorus    Collection Time: 11/29/17  5:37 AM   Result Value Ref Range    Phosphorus 2 9 2 3 - 4 1 mg/dL   Magnesium    Collection Time: 11/29/17  5:37 AM   Result Value Ref Range    Magnesium 2 0 1 6 - 2 6 mg/dL       Current Facility-Administered Medications   Medication Dose Route Frequency Provider Last Rate Last Dose    acetaminophen (TYLENOL) tablet 650 mg  650 mg Oral Q6H PRN Geno Halsted, MD   650 mg at 11/29/17 0011    amiodarone tablet 200 mg  200 mg Oral BID Margret Cat MD   200 mg at 11/28/17 1837    aspirin chewable tablet 81 mg  81 mg Oral Daily Margret Cat MD   81 mg at 11/28/17 0846    furosemide (LASIX) tablet 40 mg  40 mg Oral Daily Margret Cat MD   40 mg at 11/28/17 0846    levothyroxine tablet 25 mcg  25 mcg Oral Early Morning Margret Cat MD   25 mcg at 11/29/17 0528    lisinopril (ZESTRIL) tablet 5 mg  5 mg Oral Daily Margret Cat MD   5 mg at 11/27/17 0853    metoprolol (LOPRESSOR) injection 5 mg  5 mg Intravenous Once PRN Chris Blake DO        metoprolol succinate (TOPROL-XL) 24 hr tablet 50 mg  50 mg Oral Daily Margret Cat MD   50 mg at 11/27/17 0852    nitroglycerin (NITROSTAT) SL tablet 0 4 mg  0 4 mg Sublingual Q5 Min PRN Margret Cat MD        pantoprazole (PROTONIX) EC tablet 40 mg  40 mg Oral Early Morning Margret Cat MD   40 mg at 11/29/17 0528    pravastatin (PRAVACHOL) tablet 80 mg 80 mg Oral Daily Margret Cta MD   80 mg at 11/28/17 0846    sertraline (ZOLOFT) tablet 50 mg  50 mg Oral Daily Margret Cat MD   50 mg at 11/28/17 0846    sucralfate (CARAFATE) oral suspension 1,000 mg  1,000 mg Oral 4x Daily (AC & HS) Margret Cat MD   1,000 mg at 11/29/17 7558    traZODone (DESYREL) tablet 150 mg  150 mg Oral HS Margret Cat MD   150 mg at 11/28/17 2115       Invasive Devices     Peripheral Intravenous Line            Peripheral IV 11/25/17 Left Hand 3 days                Lab, Imaging and other studies: I have personally reviewed pertinent reports  VTE Pharmacologic Prophylaxis: Sequential compression device (Venodyne)   VTE Mechanical Prophylaxis: sequential compression device    Caleb Lab, DO   SENIOR RESIDENT NOTE: I have personally seen and examined the patient, and agree with the residents assessment  No complaints of chest pain, shortness of breath, nausea, vomiting or palpitations  Plan for discharge today with follow-up with Cardiology in 2 weeks    Physical exam as above    Alycia Ross MD , MD

## 2017-11-29 NOTE — CASE MANAGEMENT
Continued Stay Review  OBSERVATION    Date: 11/28/17    Vital Signs: /58   Pulse 70   Temp 98 6 °F (37 °C) (Oral)   Resp 18   Ht 5' 7" (1 702 m)   Wt 116 kg (255 lb 8 2 oz)   SpO2 90%   BMI 40 02 kg/m²     Medications:   Scheduled Meds:   amiodarone 200 mg Oral BID   aspirin 81 mg Oral Daily   furosemide 40 mg Oral Daily   levothyroxine 25 mcg Oral Early Morning   lisinopril 5 mg Oral Daily   metoprolol succinate 50 mg Oral Daily   pantoprazole 40 mg Oral Early Morning   pravastatin 80 mg Oral Daily   sertraline 50 mg Oral Daily   sucralfate 1,000 mg Oral 4x Daily (AC & HS)   traZODone 150 mg Oral HS     Continuous Infusions:    PRN Meds:   acetaminophen    metoprolol    nitroglycerin    Abnormal Labs/Diagnostic Results:     Age/Sex: 77 y o  male     PER CARDIO  Assessment:  1  Chest pain - catheterization showed circumflex disease  On stress test he had anterior and septal wall defect  There was no ischemia in circumflex territory  Will continue to manage medically  Will defer percutaneous intervention at this time as patient may require surgery for esophageal cancer  2  Atrial flutter - currently in sinus rhythm  He is on amiodarone and metoprolol along with Xarelto  May resume Xarelto  3   Hypothyroidism  4  Dyslipidemia-pravastatin  5  Esophageal cancer with recent esophageal stent placement  6  Hypertension - continue lisinopril and metoprolol     7  CHF - chronic combined systolic and diastolic dysfunction - S/p BiV ICD   Last EF was 45%         Plan:

## 2017-11-29 NOTE — NURSING NOTE
Discharge instructions given and new prescription explained   Patient understood and all questions were answered

## 2017-11-29 NOTE — DISCHARGE SUMMARY
[facility] Discharge Summary - Medical Sudeep Erwin Sr  77 y o  male MRN: [id number]    Sap[address] 46  Room / Bed: 57532 [facility] 407/4 FXLBN 519-* Encounter: [id number]    BRIEF OVERVIEW  Admitting Provider: Moshe Pratt DO  Discharge Provider: Disha Gunter DO     Discharge To: Home    Outpatient Follow-Up: Follow up appointment at [facility] on December 12th at 2:45PM with Dr Oma Leavitt   Patient will follow up with Cardiologist Dr Leo in 2 weeks  Patient will follow up with Gastroenterologist Dr Cira Torres in 1-2 weeks  Things to address at first follow up visit: Any recurrence of chest pain  Follow up results of PET scan that the patient is scheduled to get outpatient in the upcoming week  Patient will need a repeat TSH in 1 month  Labs and results pending at discharge: None    Admission Date: 11/25/2017     Discharge Date: 11/29/2017  4:16 PM    Primary Discharge Diagnosis  Principal Problem:    Atrial flutter with rapid ventricular response (HCC)  Active Problems:    Hypothyroidism    Anxiety disorder    Cardiomyopathy ([facility] )    Chronic kidney disease    Gastroesophageal reflux disease    Presence of cardiac pacemaker    Esophageal cancer ([facility] )  Resolved Problems:    * No resolved hospital problems   *      Consulting Providers   Cardiology- Dr Jenifer Chu    Procedures Performed/Pertinent Test results  Results for orders placed or performed during the hospital encounter of 11/25/17   MRSA culture   Result Value Ref Range    MRSA Culture Only       No Methicillin Resistant Staphlyococcus aureus (MRSA) isolated   Comprehensive metabolic panel   Result Value Ref Range    Sodium 136 136 - 145 mmol/L    Potassium 4 3 3 5 - 5 3 mmol/L    Chloride 99 (L) 100 - 108 mmol/L    CO2 26 21 - 32 mmol/L    Anion Gap 11 4 - 13 mmol/L    BUN 27 (H) 5 - 25 mg/dL    Creatinine 1 70 (H) 0 60 - 1 30 mg/dL    Glucose 114 65 - 140 mg/dL    Calcium 10 3 (H) 8 3 - 10 1 mg/dL    AST 36 5 - 45 U/L    ALT 23 12 - 78 U/L    Alkaline Phosphatase 54 46 - 116 U/L    Total Protein 8 1 6 4 - 8 2 g/dL    Albumin 2 9 (L) 3 5 - 5 0 g/dL    Total Bilirubin 0 50 0 20 - 1 00 mg/dL    eGFR 39 ml/min/1 73sq m   CBC and differential   Result Value Ref Range    WBC 9 00 4 80 - 10 80 Thousand/uL    RBC 4 36 (L) 4 70 - 6 10 Million/uL    Hemoglobin 12 4 (L) 14 0 - 18 0 g/dL    Hematocrit 38 7 (L) 42 0 - 52 0 %    MCV 89 82 - 98 fL    MCH 28 4 27 0 - 31 0 pg    MCHC 32 0 31 4 - 37 4 g/dL    RDW 12 9 11 6 - 15 1 %    MPV 8 2 (L) 8 9 - 12 7 fL    Platelets 357 803 - 938 Thousands/uL    Neutrophils Relative 70 43 - 75 %    Lymphocytes Relative 20 14 - 44 %    Monocytes Relative 5 4 - 12 %    Eosinophils Relative 4 0 - 6 %    Basophils Relative 1 0 - 1 %    Neutrophils Absolute 6 30 1 85 - 7 62 Thousands/µL    Lymphocytes Absolute 1 80 0 60 - 4 47 Thousands/µL    Monocytes Absolute 0 50 0 17 - 1 22 Thousand/µL    Eosinophils Absolute 0 30 0 00 - 0 61 Thousand/µL    Basophils Absolute 0 10 0 00 - 0 10 Thousands/µL   Protime-INR   Result Value Ref Range    Protime 10 2 9 4 - 11 7 seconds    INR 0 97 0 86 - 1 16   APTT   Result Value Ref Range    PTT 27 24 - 33 seconds   Troponin I   Result Value Ref Range    Troponin I <0 02 <=0 04 ng/mL   TSH, 3rd generation   Result Value Ref Range    TSH 3RD GENERATON 7 178 (H) 0 358 - 3 740 uIU/mL   Magnesium   Result Value Ref Range    Magnesium 2 1 1 6 - 2 6 mg/dL   Troponin I   Result Value Ref Range    Troponin I <0 02 <=0 04 ng/mL   Basic metabolic panel   Result Value Ref Range    Sodium 136 136 - 145 mmol/L    Potassium 3 8 3 5 - 5 3 mmol/L    Chloride 100 100 - 108 mmol/L    CO2 27 21 - 32 mmol/L    Anion Gap 9 4 - 13 mmol/L    BUN 27 (H) 5 - 25 mg/dL    Creatinine 1 53 (H) 0 60 - 1 30 mg/dL    Glucose 117 65 - 140 mg/dL    Glucose, Fasting 117 (H) 65 - 99 mg/dL    Calcium 9 2 8 3 - 10 1 mg/dL    eGFR 44 ml/min/1 73sq m   Magnesium   Result Value Ref Range    Magnesium 1 9 1 6 - 2 6 mg/dL   CBC (With Platelets)   Result Value Ref Range    WBC 8 50 4 80 - 10 80 Thousand/uL    RBC 3 74 (L) 4 70 - 6 10 Million/uL    Hemoglobin 11 2 (L) 14 0 - 18 0 g/dL    Hematocrit 33 1 (L) 42 0 - 52 0 %    MCV 89 82 - 98 fL    MCH 29 9 27 0 - 31 0 pg    MCHC 33 7 31 4 - 37 4 g/dL    RDW 12 9 11 6 - 15 1 %    Platelets 825 306 - 635 Thousands/uL    MPV 7 9 (L) 8 9 - 12 7 fL   Phosphorus   Result Value Ref Range    Phosphorus 2 7 2 3 - 4 1 mg/dL   Hemoglobin A1c   Result Value Ref Range    Hemoglobin A1C 5 3 4 2 - 6 3 %     mg/dl   Lipid panel   Result Value Ref Range    Cholesterol 196 50 - 200 mg/dL    Triglycerides 137 <=150 mg/dL    HDL, Direct 32 (L) 40 - 60 mg/dL    LDL Calculated 137 (H) 0 - 100 mg/dL   Troponin I   Result Value Ref Range    Troponin I <0 02 <=0 04 ng/mL   CBC and Platelet   Result Value Ref Range    WBC 7 50 4 80 - 10 80 Thousand/uL    RBC 3 78 (L) 4 70 - 6 10 Million/uL    Hemoglobin 11 1 (L) 14 0 - 18 0 g/dL    Hematocrit 33 0 (L) 42 0 - 52 0 %    MCV 88 82 - 98 fL    MCH 29 3 27 0 - 31 0 pg    MCHC 33 5 31 4 - 37 4 g/dL    RDW 12 7 11 6 - 15 1 %    Platelets 525 853 - 004 Thousands/uL    MPV 8 0 (L) 8 9 - 12 7 fL   Basic metabolic panel   Result Value Ref Range    Sodium 137 136 - 145 mmol/L    Potassium 4 1 3 5 - 5 3 mmol/L    Chloride 102 100 - 108 mmol/L    CO2 28 21 - 32 mmol/L    Anion Gap 7 4 - 13 mmol/L    BUN 22 5 - 25 mg/dL    Creatinine 1 32 (H) 0 60 - 1 30 mg/dL    Glucose 99 65 - 140 mg/dL    Glucose, Fasting 99 65 - 99 mg/dL    Calcium 8 6 8 3 - 10 1 mg/dL    eGFR 52 ml/min/1 73sq m   Phosphorus   Result Value Ref Range    Phosphorus 3 0 2 3 - 4 1 mg/dL   Magnesium   Result Value Ref Range    Magnesium 1 9 1 6 - 2 6 mg/dL   CBC and differential   Result Value Ref Range    WBC 8 10 4 80 - 10 80 Thousand/uL    RBC 3 80 (L) 4 70 - 6 10 Million/uL    Hemoglobin 11 1 (L) 14 0 - 18 0 g/dL    Hematocrit 34 0 (L) 42 0 - 52 0 % MCV 90 82 - 98 fL    MCH 29 2 27 0 - 31 0 pg    MCHC 32 6 31 4 - 37 4 g/dL    RDW 13 5 11 6 - 15 1 %    MPV 7 3 (L) 8 9 - 12 7 fL    Platelets 140 554 - 086 Thousands/uL    nRBC 0 /100 WBCs    Neutrophils Relative 74 43 - 75 %    Lymphocytes Relative 17 14 - 44 %    Monocytes Relative 6 4 - 12 %    Eosinophils Relative 4 0 - 6 %    Basophils Relative 0 0 - 1 %    Neutrophils Absolute 5 90 1 85 - 7 62 Thousands/µL    Lymphocytes Absolute 1 40 0 60 - 4 47 Thousands/µL    Monocytes Absolute 0 40 0 17 - 1 22 Thousand/µL    Eosinophils Absolute 0 30 0 00 - 0 61 Thousand/µL    Basophils Absolute 0 00 0 00 - 0 10 Thousands/µL   Basic metabolic panel   Result Value Ref Range    Sodium 137 136 - 145 mmol/L    Potassium 4 0 3 5 - 5 3 mmol/L    Chloride 103 100 - 108 mmol/L    CO2 26 21 - 32 mmol/L    Anion Gap 8 4 - 13 mmol/L    BUN 21 5 - 25 mg/dL    Creatinine 1 35 (H) 0 60 - 1 30 mg/dL    Glucose 101 65 - 140 mg/dL    Glucose, Fasting 101 (H) 65 - 99 mg/dL    Calcium 8 5 8 3 - 10 1 mg/dL    eGFR 51 ml/min/1 73sq m   Magnesium   Result Value Ref Range    Magnesium 1 9 1 6 - 2 6 mg/dL   Phosphorus   Result Value Ref Range    Phosphorus 3 1 2 3 - 4 1 mg/dL   Basic metabolic panel   Result Value Ref Range    Sodium 137 136 - 145 mmol/L    Potassium 3 8 3 5 - 5 3 mmol/L    Chloride 103 100 - 108 mmol/L    CO2 28 21 - 32 mmol/L    Anion Gap 6 4 - 13 mmol/L    BUN 19 5 - 25 mg/dL    Creatinine 1 49 (H) 0 60 - 1 30 mg/dL    Glucose 124 65 - 140 mg/dL    Calcium 8 6 8 3 - 10 1 mg/dL    eGFR 45 ml/min/1 73sq m   CBC and Platelet   Result Value Ref Range    WBC 7 70 4 80 - 10 80 Thousand/uL    RBC 3 84 (L) 4 70 - 6 10 Million/uL    Hemoglobin 11 2 (L) 14 0 - 18 0 g/dL    Hematocrit 34 2 (L) 42 0 - 52 0 %    MCV 89 82 - 98 fL    MCH 29 2 27 0 - 31 0 pg    MCHC 32 8 31 4 - 37 4 g/dL    RDW 13 4 11 6 - 15 1 %    Platelets 536 711 - 789 Thousands/uL    MPV 7 4 (L) 8 9 - 12 7 fL   Phosphorus   Result Value Ref Range    Phosphorus 2 9 2 3 - 4 1 mg/dL   Magnesium   Result Value Ref Range    Magnesium 2 0 1 6 - 2 6 mg/dL   Stress strip   Result Value Ref Range    Protocol Name [address] [facility]     Time In Exercise Phase 00:01:00     MAX  SYSTOLIC  mmHg    Max Diastolic Bp 69 mmHg    Max Heart Rate 93 BPM    Max Predicted Heart Rate 145 BPM    Reason for Termination Protocol Complete     Test Indication tachycardia,chest pain     Target Hr Formular (220 - Age)*85%     Arrhy During Ex      ECG Interp Before Ex      ECG Interp during Ex      Ex Summary Comment      Chest Pain Statement none     Overall Hr Response To Exercise      Overall BP Response To Exercise     ECG 12 lead   Result Value Ref Range    Ventricular Rate 70 BPM    Atrial Rate 70 BPM    OK Interval 116 ms    QRSD Interval 170 ms    QT Interval 500 ms    QTC Interval 540 ms    P Axis 78 degrees    QRS Axis -84 degrees    T Wave Axis 89 degrees   ECG 12 lead   Result Value Ref Range    Ventricular Rate 70 BPM    Atrial Rate 70 BPM    OK Interval 134 ms    QRSD Interval 212 ms    QT Interval 566 ms    QTC Interval 611 ms    P Axis 89 degrees    QRS Axis -89 degrees    T Wave Axis 86 degrees   ECG 12 lead   Result Value Ref Range    Ventricular Rate 135 BPM    Atrial Rate 119 BPM    OK Interval  ms    QRSD Interval 170 ms    QT Interval 372 ms    QTC Interval 558 ms    P Axis  degrees    QRS Axis -62 degrees    T Wave Axis 114 degrees     XR chest 1 view portable   ED Interpretation   CM      Final Result      No active pulmonary disease  Workstation performed: [id number]             HPI  As per H&P,   Dorothea Cheney is a 77 y o  male w/ PMH of afib on xarelto, CHF s/p ICD interrogation, CAD, HTN, HLD, esophageal cancer s/p esophageal stent placement who presents to the ER with chest pain  Patient states that since this morning he has been having a burning sensation which is mostly like his indigestion and heartburn that he always has    Patient states he has not been feeling well had 1 episode of nonbloody non bilious emesis in the morning  He has not taken any of his medications today  The chest burning sensation began to worsen so he decided to come into the ER for further evaluation  Denies dizziness, palpitations, diaphoresis, chest pain, shortness of breath  Plan discussed and agreed upon with Dr Yasemin Goodson  Hospital Course  77 y o  male w/ PMH of afib on xarelto, CHF s/p ICD interrogation, CAD, HTN, HLD, esophageal cancer s/p esophageal stent placement who presented to the ER with chest pain  In the ER, EKG was done and was noted to be Wide complex QRS tachycardia with possible underlying A- flutter  Dr Sonal Fletcher was called and recommended giving IV Lopressor 5 mg x2, Lopressor 50 mg p o  x1, amiodarone 200 mg p o  x1   Rhythm became paced at a rate of 69  Labs were drawn and chest x-ray was done  Chest pain r/o ACS  - Trop x3 negative  - Per Er, pt arrived in A-flutter, given IV lopressor 5mg x2, lopressor 50mg PO x1, amiodarone 200mg PO x1  - EKG on presentation showed wide QRS tachycardia w occasional premature ventricular complexes, Left bundle branch block; Repeat EKG   - Patient was placed on Continue nitrostat 0 4mg q5min PRN  - Patient was placed on Aspirin 81mg  - Patient was placed on nasal cannula to maintain O2 sat >92%  - Patient was placed on telemetry monitoring  - AM labs were drawn daily  On day of discharge, electrolytes showed  K 3 8 , Mg 2 0  - Pt had repeat episodes of sinus tach for several nights, each lasting approximately 9 seconds  As per nurse, the patient was laying comfortably in bed and did not have any signs of distress  No medication was administered at the time     - Cardiologist Dr Sonal Fletcher was consulted and his input was appreciated  - Stress test 11/27 abnormal with anterior and septal ischemia   Catheterization on 11/28 showed circumflex artery disease, however no intervention performed during hospitalization    Patient will follow up with   [facility] outpatient for possible stent placement in the future      Hx of A-fib  - Pt had an episode of A flutter in Er, s/p lopressor 50mg Po, IV lopressor 5mg X2, amiodarone 200mg PO  - Patient was placed on amiodarone 200mg BID  - As per Dr Sonal Fletcher, Barbmandeep Boop 15mg BID was held after 2 doses  - Patient was on continuous telemetry monitoring during hospitalization     CHF s/p ICD w/ normalization of EF  - stable, follows up with Dr Sonal Fletcher outpatient  - Patient was continued on metoprolol 50mg daily  - Patient was continued on lisinopril 5mg daily  - Patient was continued on furosemide 40mg BID  - Strict I/Os and daily weights were monitored during hospitalization     CAD  - stable  - Patient was continued on Aspirin 81mg daily     HTN  - stable  - Patient was continued on Lisinopril 5mg daily and metoprolol 50mg daily     CKD Stage 3  - stable  - baseline Cr 1 5-1 8  - Morning of discharge, Cr 1 49  - Patient was monitored on daily BMPs     Esophageal cancer s/p esophageal stent placement  - stable  - Follows outpatient w/ Dr Markus Perez  - Patient was continued on protonix 40mg BID, Carafate 10ml qac qhs, and Zofran 4mg q6hr PRN     Hypothyroidism  - stable  - TSH 7 178 during this hospitalization  - Patient was continued on Levothyroxine 25mcg daily     HLD  - stable  - Lipid panel Chol 196   Tri 137   HDL 32      - Patient was continued on pravastatin 80mg daily     Anixety  - stable  - Patient was continued on zoloft 50mg daily     Insomnia  - stable  - Patient was continued on trazadone 150mg qhs     FEN  - Heplock   - Electrolytes were repleted as needed  - Patient placed on Dario/cho controlled diet with 1 8L fluid restriction during hospitalization  - SCDs were used for DVT prophylaxis     Repeat TSH will be performed in 1 month  Patient advised to resume home medication    Patient has an appointment at [facility] on December 12 at 2:45 p m  with Dr Jaimie Carrasquillo   Patient will call and schedule an appointment for follow-up with cardiologist Dr Juan Marin 2 weeks  Patient will call and follow with gastroenterologist Dr Ana Laura Rodas in 1-2 weeks  Patient advised to return to the ER if he develops any chest pain, shortness of breath, dizziness or palpitations      Physical Exam at Discharge  General appearance: alert, appears stated age and cooperative  Head: Normocephalic, without obvious abnormality, atraumatic  Throat: lips, mucosa, and tongue normal; teeth and gums normal  Lungs: clear to auscultation bilaterally  Heart: regular rate and rhythm, S1, S2 normal, no murmur, click, rub or gallop  Abdomen: soft, non-tender; bowel sounds normal; no masses,  no organomegaly  Extremities: mild edema b/l, atraumatic, no cyanosis  Skin: Skin color, texture, turgor normal  No rashes or lesions    Medications   amiodarone 200 mg tablet   Take 1 tablet by mouth 2 (two) times a day   Refills: 0            ASPIRIN LOW DOSE 81 MG tablet   Generic drug: aspirin   Take by mouth   Refills: 0   Next Dose Due: 11/30          furosemide 40 mg tablet   Commonly known as: LASIX   Take by mouth daily   Refills: 0   Next Dose Due: 11/30          levothyroxine 25 mcg tablet   Take 25 mcg by mouth daily   Refills: 0   Next Dose Due: 11/30          lisinopril 5 mg tablet   Commonly known as: ZESTRIL   Take 5 mg by mouth   Refills: 0   Next Dose Due: 11/30          metoprolol succinate 50 mg 24 hr tablet   Commonly known as: TOPROL-XL   Take 50 mg by mouth daily Takes 3 tabs   Refills: 0   Next Dose Due: 11/30          multivitamin tablet   Take 1 tablet by mouth daily   Refills: 0   Next Dose Due: 11/30          Omega-3-Acid Eth Est (Dietary) 1 g Caps   Take by mouth   Refills: 0          omeprazole 40 MG capsule   Commonly known as: PriLOSEC   Take by mouth daily   Refills: 0          pravastatin 80 mg tablet   Commonly known as: PRAVACHOL   Take by mouth daily   Refills: 0   Next Dose Due: 11/30          rivaroxaban 20 mg tablet   Commonly known as: Carlos    Take 1 tablet by mouth daily with breakfast   Refills: 1   What changed: additional instructions   Next Dose Due: 11/30          sertraline 50 mg tablet   Commonly known as: ZOLOFT   Take 50 mg by mouth daily   Refills: 0   Next Dose Due: 11/30          sucralfate 1 g/10 mL suspension   Commonly known as: CARAFATE   Take 10 mL by mouth 4 (four) times a day (before meals and at bedtime)   Refills: 0           traZODone 150 mg tablet   Commonly known as: DESYREL   Take 150 mg by mouth daily at bedtime   Refills: 0               Allergies  No Known Allergies    Diet restrictions: heart healthy diet  Activity restrictions: as tolerated   Code Status: Level 1 - Full Code  Advance Directive and Living Will: <no information>  Power of :    POLST:      Discharge Condition: stable      Discharge  Statement   I spent 40 minutes discharging the patient  This time was spent on the day of discharge  I had direct contact with the patient on the day of discharge  Additional documentation is required if more than 30 minutes were spent on discharge

## 2017-11-30 NOTE — PROGRESS NOTES
Progress Note - Cardiology   Madeline Norman  69 y o  male MRN: [id number]  Unit/Bed#: [address] 407-01 Encounter: [id number]  11/30/17  1:15 AM        Assessment:  1  Chest pain - catheterization showed circumflex disease  On stress test he had anterior and septal wall defect  There was no ischemia in circumflex territory  Will continue to manage medically  Will defer percutaneous intervention at this time as patient may require surgery for esophageal cancer  2  Atrial flutter - currently in sinus rhythm  He is on amiodarone and metoprolol along with Xarelto  3   Hypothyroidism  4  Dyslipidemia-pravastatin  5  Esophageal cancer with recent esophageal stent placement  6  Hypertension - continue lisinopril and metoprolol  7  CHF - chronic combined systolic and diastolic dysfunction - S/p BiV ICD  Last EF was 45%  Plan:  As above      Subjective: Madeline Norman  denies any chest pain or shortness of breath  No overnight events  Meds/Allergies   current meds:   No current facility-administered medications for this encounter  No Known Allergies    Objective   Vitals: Blood pressure 113/57, pulse 70, temperature 97 8 °F (36 6 °C), temperature source Oral, resp  rate 18, height 5' 7" (1 702 m), weight 116 kg (255 lb 8 2 oz), SpO2 92 %  , Body mass index is 40 02 kg/m²  Intake/Output Summary (Last 24 hours) at 11/30/17 0115  Last data filed at 11/29/17 1147   Gross per 24 hour   Intake                0 ml   Output              750 ml   Net             -750 ml       Physical Exam   Constitutional: He appears healthy  No distress  HENT:   Nose: Nose normal    Mouth/Throat: Oropharynx is clear  Neck: Neck supple  No JVD present  Cardiovascular: Normal rate and regular rhythm  Exam reveals no distant heart sounds and no friction rub  Murmur heard     Systolic murmur is present with a grade of 2/6  at the upper left sternal border  Pulmonary/Chest: Effort normal and breath sounds normal  He has no wheezes  He has no rales  He exhibits no tenderness  Abdominal: Soft  He exhibits no distension  There is no tenderness  Musculoskeletal: He exhibits no edema  Neurological: He is alert and oriented to person, place, and time  Skin: Skin is warm and dry  No rash noted  Lab Results:     Troponins:     Results from last 7 days  Lab Units 11/26/17  0615 11/26/17  0054 11/25/17  1909   TROPONIN I ng/mL <0 02 <0 02 <0 02       Recent Results (from the past 24 hour(s))   Basic metabolic panel    Collection Time: 11/29/17  5:37 AM   Result Value Ref Range    Sodium 137 136 - 145 mmol/L    Potassium 3 8 3 5 - 5 3 mmol/L    Chloride 103 100 - 108 mmol/L    CO2 28 21 - 32 mmol/L    Anion Gap 6 4 - 13 mmol/L    BUN 19 5 - 25 mg/dL    Creatinine 1 49 (H) 0 60 - 1 30 mg/dL    Glucose 124 65 - 140 mg/dL    Calcium 8 6 8 3 - 10 1 mg/dL    eGFR 45 ml/min/1 73sq m   CBC and Platelet    Collection Time: 11/29/17  5:37 AM   Result Value Ref Range    WBC 7 70 4 80 - 10 80 Thousand/uL    RBC 3 84 (L) 4 70 - 6 10 Million/uL    Hemoglobin 11 2 (L) 14 0 - 18 0 g/dL    Hematocrit 34 2 (L) 42 0 - 52 0 %    MCV 89 82 - 98 fL    MCH 29 2 27 0 - 31 0 pg    MCHC 32 8 31 4 - 37 4 g/dL    RDW 13 4 11 6 - 15 1 %    Platelets 974 922 - 749 Thousands/uL    MPV 7 4 (L) 8 9 - 12 7 fL   Phosphorus    Collection Time: 11/29/17  5:37 AM   Result Value Ref Range    Phosphorus 2 9 2 3 - 4 1 mg/dL   Magnesium    Collection Time: 11/29/17  5:37 AM   Result Value Ref Range    Magnesium 2 0 1 6 - 2 6 mg/dL        Cardiac testing:   Nuclear stress test with anterior and septal wall ischemia    EKG: Personally reviewed  Paced rhythm with tachycardia at times  Imaging: I have personally reviewed pertinent reports

## 2017-12-01 ENCOUNTER — GENERIC CONVERSION - ENCOUNTER (OUTPATIENT)
Dept: OTHER | Facility: OTHER | Age: 77
End: 2017-12-01

## 2017-12-01 ENCOUNTER — HOSPITAL ENCOUNTER (OUTPATIENT)
Dept: RADIOLOGY | Age: 77
Discharge: HOME | End: 2017-12-01
Payer: COMMERCIAL

## 2017-12-01 DIAGNOSIS — C15.9 MALIGNANT NEOPLASM OF ESOPHAGUS, UNSPECIFIED LOCATION (HCC): ICD-10-CM

## 2017-12-01 LAB — GLUCOSE SERPL-MCNC: 101 MG/DL (ref 65–140)

## 2017-12-01 PROCEDURE — A9552 F18 FDG: HCPCS

## 2017-12-01 PROCEDURE — 82948 REAGENT STRIP/BLOOD GLUCOSE: CPT

## 2017-12-01 PROCEDURE — 78815 PET IMAGE W/CT SKULL-THIGH: CPT

## 2017-12-01 RX ADMIN — IOHEXOL 5 ML: 240 INJECTION, SOLUTION INTRATHECAL; INTRAVASCULAR; INTRAVENOUS; ORAL at 13:45

## 2017-12-04 ENCOUNTER — ALLSCRIPTS OFFICE VISIT (OUTPATIENT)
Dept: OTHER | Facility: OTHER | Age: 77
End: 2017-12-04

## 2017-12-05 ENCOUNTER — GENERIC CONVERSION - ENCOUNTER (OUTPATIENT)
Dept: OTHER | Facility: OTHER | Age: 77
End: 2017-12-05

## 2017-12-07 ENCOUNTER — GENERIC CONVERSION - ENCOUNTER (OUTPATIENT)
Dept: OTHER | Facility: OTHER | Age: 77
End: 2017-12-07

## 2017-12-08 NOTE — PROGRESS NOTES
Assessment    1  Esophageal dysmotility (530 5) (K22 4)   2  Esophageal reflux (530 81) (K21 9)    Plan   Atrial flutter    · Xarelto 20 MG Oral Tablet; Take 1 tablet daily with breakfast  Chronic combined systolic and diastolic congestive heart failure    · Furosemide 40 MG Oral Tablet; take one tablet by mouth twice daily   · (1) BASIC METABOLIC PROFILE; Status:Resulted - Requires Verification,Retrospective  Authorization;   Done: 29ARD3019 10:24AM  Coronary artery disease    · Pravastatin Sodium 80 MG Oral Tablet; TAKE ONE TABLET BY MOUTH ONCE  DAILY  Depression with anxiety    · Sertraline HCl - 50 MG Oral Tablet (Zoloft); TAKE ONE TABLET BY MOUTH ONCE  DAILY   · TraZODone HCl - 150 MG Oral Tablet; TAKE ONE TABLET BY MOUTH AT  BEDTIME  Diastolic heart failure, NYHA class 1    · Lisinopril 5 MG Oral Tablet; TAKE 1 TABLET DAILY   · Metoprolol Succinate ER 50 MG Oral Tablet Extended Release 24 Hour; TAKE  THREE TABLETS BY MOUTH ONCE DAILY  Esophageal dysmotility, Esophageal reflux    · Follow-up visit in 1 month Evaluation and Treatment  Follow-up  Status: Hold For -  Scheduling  Requested for: 04Oct2017  Esophageal reflux    · Omeprazole 40 MG Oral Capsule Delayed Release; TAKE ONE CAPSULE BY  MOUTH ONCE DAILY  Subclinical hypothyroidism    · Levothyroxine Sodium 25 MCG Oral Tablet (Synthroid); TAKE ONE TABLET BY  MOUTH ONCE DAILY    * FL BARIUM SWALLOW; Status:Hold For - Scheduling; Requested GOU:83PXR5845;   Perform:[facility] Radiology; Due:04Oct2018; Ordered;    For:Esophageal dysmotility, Esophageal reflux; Ordered By:Thomas Enriquez;   1 Eddie Shultz MD, Matt Christopher (Gastroenterology) Co-Management  *  Status: Active  Requested for: 92GQE1095  Ordered; For: Esophageal dysmotility, Esophageal reflux; Ordered By: Daiana Alcocer  Performed:   Due: 42FIB7091     Discussion/Summary    78 y/o pleasant male with multiple comorbidities presents for evaluation of difficulty swallowing solid food   He is able to tolerate liquid alone  He was recently admitted to the ICU for atrial flutter with RVR  Patient was discharged in stable condition  Patient was not intubated during hospitalization  Dysphasia: will need to rule out esophageal dysmotility  Patient has h/o GERD, smoking, MI with cardiomyopathy  He was recently hospitalized  He is morbidly obese  Will order barium swallow  Will refer patient to GI for further evaluation and treatment  Will order BMP as patient is on Lasix and admits to not being compliant with medications  He states he is forgetful  Relative in office with patient states family is trying to work with him so that he is more compliant [facility]  Patient medication for chronic conditions renewed  Possible side effects of new medications were reviewed with the patient/guardian today  The treatment plan was reviewed with the patient/guardian  The patient/guardian understands and agrees with the treatment plan     Self Referrals: No      Chief Complaint  was hospitalized last month for atrial flutter, continues to have difficulty swallowing      History of Present Illness  78 y/o male, PMHx recent hospitalization for atrial flutter and RVR, GERD, CAD, CKD, morbid obesity, presents to clinic with trouble swallowing for 3 weeks  Patient states he will eat solid food and he feels like it will get stuck in the middle of his chest  He will drink fluid to try and wash it down but he will just regurgitate the fluid and phlegm  Patient reports no pain with swallowing liquids or solids  He has no recent weight gain or weight loss  He has a history of GERD treated with Omeprazole  Patient was recently admitted to the hospital for atrial flutter  He had difficulty swallowing on admission but it resolved during his hospital stay  Review of Systems    Constitutional: no fever, not feeling poorly, no recent weight gain, no chills, not feeling tired and no recent weight loss     ENT: no earache, no nosebleeds, no sore throat, no nasal discharge and no hoarseness  Cardiovascular: no chest pain and no palpitations  Gastrointestinal: vomiting, but no abdominal pain, no nausea, no constipation, no diarrhea and no blood in stools  Endocrine: no deepening of the voice and no feelings of weakness  Hematologic/Lymphatic: no swollen glands, no tendency for easy bleeding, no tendency for easy bruising and no swollen glands in the neck  ROS reviewed  Active Problems    1  Anxiety disorder (300 00) (F41 9)   2  Atrial flutter (427 32) (I48 92)   3  BMI 45 0-49 9, adult (V85 42) (Z68 42)   4  Bursitis of left shoulder (726 10) (M75 52)   5  Cardiomyopathy (425 4) (I42 9)   6  Chronic combined systolic and diastolic congestive heart failure (428 42,428 0) (I50 42)   7  CKD (chronic kidney disease), stage III (585 3) (N18 3)   8  Coronary artery disease (414 00) (I25 10)   9  Cubital tunnel syndrome on left (354 2) (G56 22)   10  Depression with anxiety (300 4) (F41 8)   11  Diabetes mellitus screening (V77 1) (Z13 1)   12  Diastolic heart failure, NYHA class 1 (428 30) (I50 30)   13  Elevated TSH (794 5) (R94 6)   14  Esophageal reflux (530 81) (K21 9)   15  Gynecomastia, male (611 1) (N62)   12  Hand pain (729 5) (M79 643)   17  Hand tingling (782 0) (R20 2)   18  Hyperkalemia (276 7) (E87 5)   19  Hypertensive Renal Sclerosis (403 90)   20  ICD (implantable cardioverter-defibrillator) in place (V45 02) (Z95 810)   21  Iliotibial band syndrome, left leg (728 89) (M76 32)   22  Insomnia (780 52) (G47 00)   23  Localized, primary osteoarthritis of lower leg, unspecified laterality   24  Mixed hyperlipidemia (272 2) (E78 2)   25  Muscle twitching (781 0) (R25 3)   26  Need for DTaP, hepatitis B, and IPV vaccination (V06 8) (Z23)   27  Need for influenza vaccination (V04 81) (Z23)   28  Need for pneumococcal vaccination (V03 82) (Z23)   29   Need for vaccination with 13-polyvalent pneumococcal conjugate vaccine (V03 82) (Z23)   30  Pacemaker (V45 01) (Z95 0)   31  Peripheral edema (782 3) (R60 9)   32  Prediabetes (790 29) (R73 03)   33  Screening for neurological condition (V80 09) (Z13 89)   34  Subclinical hypothyroidism (244 8) (E03 9)   35  Thyroid disorder screening (V77 0) (Z13 29)    Past Medical History    1  History of Acute thigh pain, right (729 5) (M79 651)   2  History of Anxiety (300 00) (F41 9)   3  History of Benign essential hypertension (401 1) (I10)   4  History of Cardiomyopathy, ischemic (414 8) (I25 5)   5  History of chronic kidney disease (V13 09) (Z87 448)   6  History of hyperlipidemia (V12 29) (Z86 39)    The active problems and past medical history were reviewed and updated today  Surgical History    1  History of Colonoscopy (Fiberoptic) Screening   2  History of Open Treatment Of Tibial Shaft Fracture With Implant   3  History of Pacemaker - Pulse Generator Replacement   4  History of Pacemaker Placement    The surgical history was reviewed and updated today  Family History  Mother    1  Family history of sudden cardiac death (SCD) (V17 41) (Z82 41)    The family history was reviewed and updated today  Social History    · Denied: History of Alcohol use   · Always uses seat belt   ·    · Denied: History of Drug use   · Exercises daily (V49 89) (Z78 9)   · History of    · Never a smoker   · Pets/Animals: Dog   · Sleeps 8 - 10 hours a day  The social history was reviewed and updated today  The social history was reviewed and is unchanged  Current Meds   1  Amiodarone HCl - 200 MG Oral Tablet; TAKE 1 TABLET TWICE DAILY; Therapy: (Recorded:38Lfo9724) to Recorded   2  Aspirin Low Dose 81 MG TABS; TAKE 1 TABLET DAILY; Therapy: (Thomas Landa) to Recorded   3  Furosemide 40 MG Oral Tablet; take one tablet by mouth twice daily; Therapy: 70MMU6642 to (Evaluate:17Oct2017)  Requested for: 45JLD3245; Last   Rx:65Unz3507 Ordered   4   Levothyroxine Sodium 25 MCG Oral Tablet; TAKE ONE TABLET BY MOUTH ONCE DAILY; Therapy: 10Cgo4486 to (Evaluate:11Oct2017)  Requested for: 87Uaw4907; Last   Rx:78Xxh4019; Status: ACTIVE - Renewal Denied, Transmit to Pharmacy - Awaiting   Verification Ordered   5  Lisinopril 5 MG Oral Tablet; TAKE 1 TABLET DAILY; Therapy: 23RSV5820 to (Evaluate:31Jan2017)  Requested for: 88Zcf4824; Last   Rx:66Jsk4487 Ordered   6  Metoprolol Succinate ER 50 MG Oral Tablet Extended Release 24 Hour; TAKE THREE   TABLETS BY MOUTH ONCE DAILY; Therapy: 62OKG4434 to (Evaluate:22Sep2017)  Requested for: 92Pzr6924; Last   Rx:50Ttx7074 Ordered   7  Omega-3-acid Ethyl Esters 1 GM Oral Capsule; TAKE ONE CAPSULE BY MOUTH TWICE   DAILY; Therapy: 53Dco9615 to ([phone])  Requested for: 47LNI1047; Last   CP:83CWL7308 Ordered   8  Omeprazole 40 MG Oral Capsule Delayed Release; TAKE ONE CAPSULE BY MOUTH   ONCE DAILY; Therapy: 78UUE1163 to (Noemi Mortensen)  Requested for: 31Wds8686; Last   Rx:44Bee5274 Ordered   9  Pravastatin Sodium 80 MG Oral Tablet; TAKE ONE TABLET BY MOUTH ONCE DAILY; Therapy: 93SNW5912 to (Evaluate:24Sep2017)  Requested for: 28Mar2017; Last   Rx:28Mar2017 Ordered   10  Sertraline HCl - 50 MG Oral Tablet; TAKE ONE TABLET BY MOUTH ONCE DAILY; Therapy: 20BLR8253 to (Ninfa Arevalo)  Requested for: 05Sep2017; Last    Rx:05Sep2017 Ordered   11  TraZODone HCl - 150 MG Oral Tablet; TAKE ONE TABLET BY MOUTH AT BEDTIME; Therapy: 10KZQ8975 to (Evaluate:62Vew4659)  Requested for: 20Jun2017; Last    Rx:20Jun2017 Ordered   12  Xarelto 20 MG Oral Tablet; Take 1 tablet daily; Therapy: (Recorded:08Sep2017) to Recorded    The medication list was reviewed and updated today  Allergies    1  No Known Drug Allergies    2   No Known Latex Allergies    Vitals  Vital Signs    Recorded: 84KOB7418 11:52AM   Temperature 98 3 F   Heart Rate 70   Respiration 20   Systolic 057   Diastolic 64   Height 5 ft 6 in   Weight 270 lb 8 oz   BMI Calculated 43 66   BSA Calculated 2 27   O2 Saturation 95     Physical Exam    Constitutional   General appearance: No acute distress, well appearing and well nourished  Ears, Nose, Mouth, and Throat   External inspection of ears and nose: Normal     Oropharynx: Normal with no erythema, edema, exudate or lesions  Pulmonary   Respiratory effort: No increased work of breathing or signs of respiratory distress  Auscultation of lungs: Clear to auscultation, equal breath sounds bilaterally, no wheezes, no rales, no rhonci  Cardiovascular   Auscultation of heart: Normal rate and rhythm, normal S1 and S2, without murmurs  Abdomen   Abdomen: Non-tender, no masses  Liver and spleen: No hepatomegaly or splenomegaly  Lymphatic   Palpation of lymph nodes in neck: No lymphadenopathy  Additional Exam:  Neck: trachea midline  No swelling non-tender to palpation  Thyroid normal  no mass          Results/Data  (1) BASIC METABOLIC PROFILE 88VFQ2259 10:24AM Cindy Bowman     Test Name Result Flag Reference   Glucose, Serum 156 mg/dL H 65-99   BUN 22 mg/dL  8-27   Creatinine, Serum 1 86 mg/dL H 0 76-1 27   BUN/Creatinine Ratio 12  10-24   Sodium, Serum 138 mmol/L  134-144   Potassium, Serum 3 8 mmol/L  3 5-5 2   Chloride, Serum 96 mmol/L     Carbon Dioxide, Total 23 mmol/L  18-29   Calcium, Serum 8 8 mg/dL  8 6-10 2   eGFR If NonAfricn Am 35 mL/min/1 73 L >59   eGFR If Africn Am 40 mL/min/1 73 L >59   Glucose, Serum 156 mg/dL H 65-99   BUN 22 mg/dL  8-27   Creatinine, Serum 1 86 mg/dL H 0 76-1 27   BUN/Creatinine Ratio 12  10-24   Sodium, Serum 138 mmol/L  134-144   Potassium, Serum 3 8 mmol/L  3 5-5 2   Chloride, Serum 96 mmol/L     Carbon Dioxide, Total 23 mmol/L  18-29   Calcium, Serum 8 8 mg/dL  8 6-10 2   eGFR If NonAfricn Am 35 mL/min/1 73 L >59   eGFR If Africn Am 40 mL/min/1 73 L >59             Future Appointments    Date/Time Provider Specialty Site   11/02/2017 04:00 PM Marilynn Dennis, M D  Gastroenterology Adult [address] [place] [address]     Signatures   Electronically signed by : Teddy Walters MD; Oct  8 2017  3:35AM EST                       (Author)

## 2017-12-12 ENCOUNTER — ALLSCRIPTS OFFICE VISIT (OUTPATIENT)
Dept: OTHER | Facility: OTHER | Age: 77
End: 2017-12-12

## 2017-12-12 ENCOUNTER — GENERIC CONVERSION - ENCOUNTER (OUTPATIENT)
Dept: OTHER | Facility: OTHER | Age: 77
End: 2017-12-12

## 2017-12-15 ENCOUNTER — GENERIC CONVERSION - ENCOUNTER (OUTPATIENT)
Dept: OTHER | Facility: OTHER | Age: 77
End: 2017-12-15

## 2017-12-15 ENCOUNTER — APPOINTMENT (OUTPATIENT)
Dept: RADIATION ONCOLOGY | Facility: HOSPITAL | Age: 77
End: 2017-12-15
Attending: RADIOLOGY
Payer: COMMERCIAL

## 2017-12-15 ENCOUNTER — ALLSCRIPTS OFFICE VISIT (OUTPATIENT)
Dept: OTHER | Facility: OTHER | Age: 77
End: 2017-12-15

## 2017-12-15 PROCEDURE — G0463 HOSPITAL OUTPT CLINIC VISIT: HCPCS | Performed by: RADIOLOGY

## 2017-12-15 PROCEDURE — 99215 OFFICE O/P EST HI 40 MIN: CPT | Performed by: RADIOLOGY

## 2017-12-18 ENCOUNTER — ALLSCRIPTS OFFICE VISIT (OUTPATIENT)
Dept: OTHER | Facility: OTHER | Age: 77
End: 2017-12-18

## 2017-12-19 ENCOUNTER — GENERIC CONVERSION - ENCOUNTER (OUTPATIENT)
Dept: OTHER | Facility: OTHER | Age: 77
End: 2017-12-19

## 2017-12-19 NOTE — PROGRESS NOTES
Assessment  Assessed    1  Atrial flutter (427 32) (I48 92)   2  Benign essential hypertension (401 1) (I10)   3  BMI 40 0-44 9, adult (V85 41) (Z68 41)   4  Cardiomyopathy (425 4) (I42 9)   5  Chronic combined systolic and diastolic congestive heart failure (428 42,428 0) (I50 42)   6  Coronary artery disease (414 00) (I25 10)    Plan  Atrial flutter    · EKG/ECG- POC; Status:Complete;   Done: 47OVL2291   Perform: In Office; Due:58Egm4638; Last Updated By:Lucas Bañuelos; 12/18/2017 2:56:25 PM;Ordered; For:Atrial flutter; Ordered By:Radu Leo;    ECHO COMPLETE WITH CONTRAST IF INDICATED; Status:Need Information - Financial Authorization; Requested for:[id number];  Perform:[facility] Radiology; 94 31 11; Ordered; For:Chronic combined systolic and diastolic congestive heart failure; Ordered By:Radu Leo;    Discussion/Summary  Cardiology Discussion Summary Free Text Note Form [facility]:   1  CHF - ACC/AHA Stage C  S/p ICD with normalization of EF  Continue Metoprolol 50 mg daily along with lisinopril 5 mg daily  Will continue furosemide 40 mg twice a day  2  Status post biventricular pacemaker/ICD -pacemaker interrogation done today showed normal functioning  No further episodes of atrial flutter  3  Dyslipidemia - pravastatin  4  CAD - ASA5  Atrial flutter - continue Amiodarone and Xarelto  Follow up in 4 months  Pacemaker interrogation routinely  Chief Complaint  Chief Complaint Free Text Note Form: Hospital follow up from catheterization; no cardiac complaints today  mena/ma      History of Present Illness  Cardiology HPI Free Text Note Form [facility]: Mr Angela Bolden is a 69year old male here for follow up of recent hospitalization  He was admitted to [facility] after developing chest pain which he described as a burning sensation in the center part of his chest  Was found to be in atrial flutter with RVR  He had a similar episode a few months prior   He was given IV Lopressor and converted to sinus rhythm  He was restarted on amiodarone and metoprolol  Since hospital discharge, he has been feeling well without any complaints  Will be starting treatment for esophageal Cancer soon  He has a history of non-ischemic CHF and has a previous BiV/ICD placement  In October 2015 he had a generator replacement  He had tender gynecomastia previously and aldactone was discontinued  Pain has resolved  On his most recent echocardiogram, ejection fraction had improved to 50-55%  He denies any chest pain, lower extremity edema, syncope or near syncope  He has shortness of breath with exertion which he attributes to his heavy weight  He denies any orthopnea or paroxysmal nocturnal dyspnea  Reports good adherence to medication and does not miss doses  Review of Systems  Cardiology Male ROS:    Cardiac: rhythm problems, but-- as noted in HPI,-- no chest pain,-- no fainting/blackouts,-- no signs of swelling-- and-- no palpitations present  Skin: No complaints of nonhealing sores or skin rash  Genitourinary: No complaints of recurrent urinary tract infections, frequent urination at night, difficult urination, blood in urine, kidney stones, loss of bladder control, no kidney or prostate problems, no erectile dysfunction  Psychological: depression,-- anxiety-- and-- panic attacks  General: lack of energy/fatigue  HEENT: snoring  Hematologic: No complaints of bleeding disorders, anemia, blood clots, or excessive brusing  Neurological: No complaints of numbness, tingling, dizziness, weakness, seizures, headaches, syncope or fainting, AM fatigue, daytime sleepiness, no witnessed apnea episodes  Musculoskeletal: back pain-- and-- swelling/pain   ROS Reviewed:   ROS reviewed  Active Problems  Problems    1  Abdominal hernia (553 9) (K46 9)   2  Abscess of back (682 2) (L02 212)   3  Anxiety disorder (300 00) (F41 9)   4  Atrial flutter (427 32) (I48 92)   5  Benign essential hypertension (401 1) (I10)   6   BMI 40 0-44 9, adult (V85 41) (Z68 41)   7  Bursitis of left shoulder (726 10) (M75 52)   8  Cardiomyopathy (425 4) (I42 9)   9  Chronic combined systolic and diastolic congestive heart failure (428 42,428 0) (I50 42)   10  CKD (chronic kidney disease), stage III (585 3) (N18 3)   11  Colon cancer screening (V76 51) (Z12 11)   12  Coronary artery disease (414 00) (I25 10)   13  Cubital tunnel syndrome on left (354 2) (G56 22)   14  Depression with anxiety (300 4) (F41 8)   15  Diabetes mellitus screening (V77 1) (Z13 1)   16  Diastolic heart failure, NYHA class 1 (428 30) (I50 30)   17  Difficulty swallowing (787 20) (R13 10)   18  Elevated TSH (794 5) (R94 6)   19  Esophageal cancer (150 9) (C15 9)   20  Esophageal dysmotility (530 5) (K22 4)   21  Esophageal mass (530 9) (K22 9)   22  Esophageal reflux (530 81) (K21 9)   23  GERD (gastroesophageal reflux disease) (530 81) (K21 9)   24  Gynecomastia, male (611 1) (N62)   22  Hand pain (729 5) (M79 643)   26  Hand tingling (782 0) (R20 2)   27  Hyperkalemia (276 7) (E87 5)   28  Hypertensive Renal Sclerosis (403 90)   29  ICD (implantable cardioverter-defibrillator) in place (V45 02) (Z95 810)   30  Iliotibial band syndrome, left leg (728 89) (M76 32)   31  Insomnia (780 52) (G47 00)   32  Localized, primary osteoarthritis of lower leg, unspecified laterality   33  Mixed hyperlipidemia (272 2) (E78 2)   34  Morbid obesity (278 01) (E66 01)   35  Muscle twitching (781 0) (R25 3)   36  Need for DTaP, hepatitis B, and IPV vaccination (V06 8) (Z23)   37  Need for influenza vaccination (V04 81) (Z23)   38  Need for pneumococcal vaccination (V03 82) (Z23)   39  Need for vaccination with 13-polyvalent pneumococcal conjugate vaccine (V03 82) (Z23)   40  Pacemaker (V45 01) (Z95 0)   41  Peripheral edema (782 3) (R60 9)   42  Prediabetes (790 29) (R73 03)   43  Screening for neurological condition (V80 09) (Z13 89)   44  Subclinical hypothyroidism (244 8) (E03 9)   45   Thyroid disorder screening (V77 0) (Z13 29)   46  Transition of care    Past Medical History  Problems    1  History of Acute thigh pain, right (729 5) (M79 651)   2  History of Anxiety (300 00) (F41 9)   3  History of Cardiomyopathy, ischemic (414 8) (I25 5)   4  History of chronic kidney disease (V13 09) (Z87 448)   5  History of hyperlipidemia (V12 29) (Z86 39)  Active Problems And Past Medical History Reviewed: The active problems and past medical history were reviewed and updated today  Surgical History  Problems    1  History of Colonoscopy (Fiberoptic) Screening   2  History of Diagnostic Esophagogastroduodenoscopy   3  History of Open Treatment Of Tibial Shaft Fracture With Implant   4  History of Pacemaker - Pulse Generator Replacement   5  History of Pacemaker Placement  Surgical History Reviewed: The surgical history was reviewed and updated today  Family History  Mother    1  Denied: Family history of colon cancer   2  Denied: Family history of colonic polyps   3  Denied: Family history of liver disease   4  Family history of malignant neoplasm of cervix (V16 49) (Z80 49)   5  Family history of sudden cardiac death (SCD) (V17 41) (Z82 41)  Father    6  Denied: Family history of colon cancer   7  Denied: Family history of colonic polyps   8  Denied: Family history of liver disease  Family History Reviewed: The family history was reviewed and updated today  Social History  Problems    · Denied: History of Alcohol use   · Always uses seat belt   ·    · Denied: History of Drug use   · Exercises daily (V49 89) (Z78 9)   · Feels safe at home   · Five children   · Former smoker (J03 50) (K08 425)   · History of    · Sleeps 8 - 10 hours a day  Social History Reviewed: The social history was reviewed and updated today  Current Meds   1  Amiodarone HCl - 200 MG Oral Tablet; TAKE 1 TABLET TWICE DAILY; Therapy: (Recorded:66Ywh2460) to Recorded   2   Aspirin 81 MG TABS; TAKE 1 TABLET DAILY; Therapy: (Recorded:89Fgx2583) to Recorded   3  Carafate 1 GM/10ML Oral Suspension; TAKE 10 ML 4 TIMES DAILY  Requested for: 99Hnp8288; Last Rx:02Dec2017 Ordered   4  Daily Multivitamin TABS; TAKE 1 TABLET DAILY; Therapy: (Recorded:42Ols5354) to Recorded   5  Lasix 40 MG Oral Tablet; TAKE 1 TABLET DAILY; Therapy: (Recorded:42Vww7766) to Recorded   6  Levothyroxine Sodium 25 MCG Oral Tablet; TAKE ONE TABLET BY MOUTH ONCE DAILY; Therapy: 48Pgq7917 to (Evaluate:02Jan2018)  Requested for: 99PMB8280; Last Rx:04Oct2017 Ordered   7  Lisinopril 5 MG Oral Tablet; TAKE 1 TABLET DAILY; Therapy: 40Tvt3686 to (Evaluate:90Hqi1030)  Requested for: 24Oct2017; Last Rx:04Oct2017 Ordered   8  Metoprolol Succinate ER 50 MG Oral Tablet Extended Release 24 Hour; TAKE THREE TABLETS BY MOUTH ONCE DAILY; Therapy: 54LZF2428 to (Evaluate:02Apr2018)  Requested for: 24Oct2017; Last Rx:04Oct2017 Ordered   9  Omega-3-acid Ethyl Esters 1 GM Oral Capsule; TAKE ONE CAPSULE BY MOUTH TWICE DAILY; Therapy: 24Uoi0011 to (Rehan Lucio)  Requested for: 50BYQ8325; Last Rx:23Jan2017 Ordered   10  Omeprazole 40 MG Oral Capsule Delayed Release; TAKE ONE CAPSULE BY MOUTH  ONCE DAILY; Therapy: 35EJB7471 to (Evaluate:01Feb2018)  Requested for: 07JPP2219; Last  Rx:04Oct2017 Ordered   11  Pravastatin Sodium 80 MG Oral Tablet; TAKE ONE TABLET BY MOUTH ONCE DAILY; Therapy: 71UNG5425 to 452 8137)  Requested for: 24Oct2017; Last  Rx:04Oct2017 Ordered   12  Sertraline HCl - 50 MG Oral Tablet; TAKE ONE TABLET BY MOUTH ONCE DAILY; Therapy: 40KWG6439 to (Evaluate:94Dtv4884)  Requested for: 24Oct2017; Last  Rx:04Oct2017 Ordered   13  Sucralfate 1 GM Oral Tablet; TAKE 1 TABLET 4 times daily 1 hour after meals; Therapy: 48XAG0817 to (Last Rx:05Dec2017)  Requested for: 74HXV5215 Ordered   14  TraZODone HCl - 150 MG Oral Tablet; TAKE ONE TABLET BY MOUTH AT BEDTIME;   Therapy: 20EWB6579 to (Evaluate:02Apr2018)  Requested for: [phone]; Last Rx: 65ICO2692 Ordered   15  Xarelto 20 MG Oral Tablet; Take 1 tablet daily with breakfast  Requested for: 18AYT4819;  Last Rx:37Rpl7479 Ordered  Medication List Reviewed: The medication list was reviewed and updated today  Allergies  Medication    1  No Known Drug Allergies  Non-Medication    2  No Known Latex Allergies    Vitals  Vital Signs    Recorded: 39IFR8024 02:45PM   Heart Rate 77, Apical   Systolic 317, LUE, Sitting   Diastolic 62, LUE, Sitting   BP CUFF SIZE Large   Height 5 ft 6 in   Weight 253 lb    BMI Calculated 40 84   BSA Calculated 2 21   O2 Saturation 96, RA     Physical Exam   Constitutional - General appearance: No acute distress, well appearing and well nourished  Eyes - Conjunctiva and Sclera examination: Conjunctiva pink, sclera anicteric  Neck - Normal, no JVD   Pulmonary - Respiratory effort: No signs of respiratory distress  -- Auscultation of lungs: Clear to auscultation  Cardiovascular - Auscultation of heart: Normal rate and rhythm, normal S1 and S2, no murmurs  -- Pedal pulses: Normal, 2+ bilaterally  -- Examination of extremities for edema and/or varicosities: Abnormal   bilateral ankle --U+ pitting edema-- and-- bilateral pretibial --U+ pitting edema  Chest -  Chest: Abnormal-- bilateral gynecomastia  Left ICD site tender to palptation  Abdomen - Soft  Musculoskeletal - Gait and station: Normal gait  Skin -  Skin and subcutaneous tissue: Abnormal-- scar left leg  Neurologic - Speech normal  No focal deficits  Psychiatric - Orientation to person, place, and time: Normal       Results/Data  Diagnostic Studies Reviewed Cardio:  Echocardiogram/FADI: Normal EF  ICD/ Pacer Interpretation Done in office today  No events  BiV pacing 99% of time  No episodes of atrial fibrillation  ECG Report:  Rhythm and rate:  normal sinus rhythm  Abnormal Rhythym: Pacemaker rhythm: with normal sensing-- and-- with normal capture  Comparison to prior ECGs: Ventricular pacemaker  Future Appointments    Date/Time Provider Specialty Site   12/28/2017 08:00 AM [facility]iology Device [facility], Remote [address] CARDIOLOGY [facility] [place]   02/08/2018 01:30 PM Cardiology Device Clinic, [facility]OLOG[facility]   12/19/2017 09:00 AM Wiliam Rodriguez MD Hematology [facility]     Signatures   Electronically signed by : Madalyn Amaro DO; Dec 18 2017  4:16PM EST                       (Author)

## 2017-12-21 ENCOUNTER — TRANSCRIBE ORDERS (OUTPATIENT)
Dept: ADMINISTRATIVE | Facility: HOSPITAL | Age: 77
End: 2017-12-21

## 2017-12-21 DIAGNOSIS — R13.10 PROBLEMS WITH SWALLOWING AND MASTICATION: ICD-10-CM

## 2017-12-21 DIAGNOSIS — C15.9 MALIGNANT NEOPLASM OF ESOPHAGUS, UNSPECIFIED LOCATION (HCC): Primary | ICD-10-CM

## 2017-12-22 ENCOUNTER — ANESTHESIA EVENT (OUTPATIENT)
Dept: PERIOP | Facility: HOSPITAL | Age: 77
End: 2017-12-22

## 2017-12-22 ENCOUNTER — GENERIC CONVERSION - ENCOUNTER (OUTPATIENT)
Dept: OTHER | Facility: OTHER | Age: 77
End: 2017-12-22

## 2017-12-22 ENCOUNTER — HOSPITAL ENCOUNTER (OUTPATIENT)
Dept: RADIOLOGY | Facility: HOSPITAL | Age: 77
Discharge: HOME | End: 2017-12-22

## 2017-12-22 ENCOUNTER — HOSPITAL ENCOUNTER (EMERGENCY)
Facility: HOSPITAL | Age: 77
Discharge: STILL ADMITTED OR ADMINISTRATIVE | End: 2017-12-22
Attending: EMERGENCY MEDICINE | Admitting: INTERNAL MEDICINE
Payer: COMMERCIAL

## 2017-12-22 ENCOUNTER — GENERIC CONVERSION - ENCOUNTER (OUTPATIENT)
Dept: GASTROENTEROLOGY | Facility: CLINIC | Age: 77
End: 2017-12-22

## 2017-12-22 ENCOUNTER — ANESTHESIA (OUTPATIENT)
Dept: PERIOP | Facility: HOSPITAL | Age: 77
End: 2017-12-22

## 2017-12-22 ENCOUNTER — HOSPITAL ENCOUNTER (OUTPATIENT)
Dept: RADIOLOGY | Facility: HOSPITAL | Age: 77
Discharge: HOME | End: 2017-12-22
Attending: INTERNAL MEDICINE | Admitting: INTERNAL MEDICINE
Payer: COMMERCIAL

## 2017-12-22 VITALS
WEIGHT: 255 LBS | DIASTOLIC BLOOD PRESSURE: 65 MMHG | HEART RATE: 70 BPM | OXYGEN SATURATION: 96 % | TEMPERATURE: 98.2 F | BODY MASS INDEX: 40.02 KG/M2 | RESPIRATION RATE: 16 BRPM | HEIGHT: 67 IN | SYSTOLIC BLOOD PRESSURE: 150 MMHG

## 2017-12-22 DIAGNOSIS — R13.10 DYSPHAGIA: ICD-10-CM

## 2017-12-22 DIAGNOSIS — R13.19 ESOPHAGEAL DYSPHAGIA: Primary | ICD-10-CM

## 2017-12-22 DIAGNOSIS — C15.9 MALIGNANT NEOPLASM OF ESOPHAGUS (HCC): ICD-10-CM

## 2017-12-22 DIAGNOSIS — K21.9 GASTRO-ESOPHAGEAL REFLUX DISEASE WITHOUT ESOPHAGITIS: ICD-10-CM

## 2017-12-22 DIAGNOSIS — T85.528A MIGRATION OF ESOPHAGEAL STENT, INITIAL ENCOUNTER: ICD-10-CM

## 2017-12-22 DIAGNOSIS — C15.9 MALIGNANT NEOPLASM OF ESOPHAGUS, UNSPECIFIED LOCATION (HCC): ICD-10-CM

## 2017-12-22 DIAGNOSIS — K22.9 DISEASE OF ESOPHAGUS: ICD-10-CM

## 2017-12-22 DIAGNOSIS — R13.10 PROBLEMS WITH SWALLOWING AND MASTICATION: ICD-10-CM

## 2017-12-22 LAB
ALBUMIN SERPL BCP-MCNC: 3.4 G/DL (ref 3.5–5)
ALP SERPL-CCNC: 55 U/L (ref 46–116)
ALT SERPL W P-5'-P-CCNC: 25 U/L (ref 12–78)
ANION GAP SERPL CALCULATED.3IONS-SCNC: 11 MMOL/L (ref 4–13)
AST SERPL W P-5'-P-CCNC: 16 U/L (ref 5–45)
BASOPHILS # BLD AUTO: 0 THOUSANDS/ΜL (ref 0–0.1)
BASOPHILS NFR BLD AUTO: 1 % (ref 0–1)
BILIRUB SERPL-MCNC: 0.2 MG/DL (ref 0.2–1)
BUN SERPL-MCNC: 21 MG/DL (ref 5–25)
CALCIUM SERPL-MCNC: 8.8 MG/DL (ref 8.3–10.1)
CHLORIDE SERPL-SCNC: 104 MMOL/L (ref 100–108)
CO2 SERPL-SCNC: 26 MMOL/L (ref 21–32)
CREAT SERPL-MCNC: 1.72 MG/DL (ref 0.6–1.3)
EOSINOPHIL # BLD AUTO: 0.3 THOUSAND/ΜL (ref 0–0.61)
EOSINOPHIL NFR BLD AUTO: 5 % (ref 0–6)
ERYTHROCYTE [DISTWIDTH] IN BLOOD BY AUTOMATED COUNT: 15.3 % (ref 11.6–15.1)
GFR SERPL CREATININE-BSD FRML MDRD: 38 ML/MIN/1.73SQ M
GLUCOSE SERPL-MCNC: 98 MG/DL (ref 65–140)
HCT VFR BLD AUTO: 36.3 % (ref 42–52)
HGB BLD-MCNC: 11.8 G/DL (ref 14–18)
LIPASE SERPL-CCNC: 232 U/L (ref 73–393)
LYMPHOCYTES # BLD AUTO: 1.4 THOUSANDS/ΜL (ref 0.6–4.47)
LYMPHOCYTES NFR BLD AUTO: 22 % (ref 14–44)
MCH RBC QN AUTO: 29.1 PG (ref 27–31)
MCHC RBC AUTO-ENTMCNC: 32.5 G/DL (ref 31.4–37.4)
MCV RBC AUTO: 90 FL (ref 82–98)
MONOCYTES # BLD AUTO: 0.4 THOUSAND/ΜL (ref 0.17–1.22)
MONOCYTES NFR BLD AUTO: 6 % (ref 4–12)
NEUTROPHILS # BLD AUTO: 4.1 THOUSANDS/ΜL (ref 1.85–7.62)
NEUTS SEG NFR BLD AUTO: 66 % (ref 43–75)
NRBC BLD AUTO-RTO: 0 /100 WBCS
PLATELET # BLD AUTO: 216 THOUSANDS/UL (ref 130–400)
PMV BLD AUTO: 7.1 FL (ref 8.9–12.7)
POTASSIUM SERPL-SCNC: 4.2 MMOL/L (ref 3.5–5.3)
PROT SERPL-MCNC: 8 G/DL (ref 6.4–8.2)
RBC # BLD AUTO: 4.05 MILLION/UL (ref 4.7–6.1)
SODIUM SERPL-SCNC: 141 MMOL/L (ref 136–145)
WBC # BLD AUTO: 6.2 THOUSAND/UL (ref 4.8–10.8)

## 2017-12-22 PROCEDURE — 85025 COMPLETE CBC W/AUTO DIFF WBC: CPT | Performed by: EMERGENCY MEDICINE

## 2017-12-22 PROCEDURE — 99284 EMERGENCY DEPT VISIT MOD MDM: CPT

## 2017-12-22 PROCEDURE — 36415 COLL VENOUS BLD VENIPUNCTURE: CPT | Performed by: EMERGENCY MEDICINE

## 2017-12-22 PROCEDURE — 80053 COMPREHEN METABOLIC PANEL: CPT | Performed by: EMERGENCY MEDICINE

## 2017-12-22 PROCEDURE — 83690 ASSAY OF LIPASE: CPT | Performed by: EMERGENCY MEDICINE

## 2017-12-22 PROCEDURE — 74220 X-RAY XM ESOPHAGUS 1CNTRST: CPT

## 2017-12-22 RX ORDER — SODIUM CHLORIDE 9 MG/ML
INJECTION, SOLUTION INTRAVENOUS CONTINUOUS PRN
Status: DISCONTINUED | OUTPATIENT
Start: 2017-12-22 | End: 2017-12-22 | Stop reason: SURG

## 2017-12-22 RX ORDER — FENTANYL CITRATE/PF 50 MCG/ML
25 SYRINGE (ML) INJECTION AS NEEDED
Status: ACTIVE | OUTPATIENT
Start: 2017-12-22

## 2017-12-22 RX ORDER — PROMETHAZINE HYDROCHLORIDE 25 MG/ML
12.5 INJECTION, SOLUTION INTRAMUSCULAR; INTRAVENOUS ONCE AS NEEDED
Status: ACTIVE | OUTPATIENT
Start: 2017-12-22

## 2017-12-22 RX ORDER — SUCCINYLCHOLINE CHLORIDE 20 MG/ML
INJECTION INTRAMUSCULAR; INTRAVENOUS AS NEEDED
Status: DISCONTINUED | OUTPATIENT
Start: 2017-12-22 | End: 2017-12-22 | Stop reason: SURG

## 2017-12-22 RX ORDER — ONDANSETRON 2 MG/ML
4 INJECTION INTRAMUSCULAR; INTRAVENOUS ONCE AS NEEDED
Status: ACTIVE | OUTPATIENT
Start: 2017-12-22

## 2017-12-22 RX ORDER — ONDANSETRON 2 MG/ML
INJECTION INTRAMUSCULAR; INTRAVENOUS AS NEEDED
Status: DISCONTINUED | OUTPATIENT
Start: 2017-12-22 | End: 2017-12-22 | Stop reason: SURG

## 2017-12-22 RX ORDER — LIDOCAINE HYDROCHLORIDE 10 MG/ML
INJECTION, SOLUTION INFILTRATION; PERINEURAL AS NEEDED
Status: DISCONTINUED | OUTPATIENT
Start: 2017-12-22 | End: 2017-12-22 | Stop reason: SURG

## 2017-12-22 RX ORDER — SODIUM CHLORIDE 9 MG/ML
125 INJECTION, SOLUTION INTRAVENOUS CONTINUOUS
Status: DISCONTINUED | OUTPATIENT
Start: 2017-12-22 | End: 2017-12-22 | Stop reason: HOSPADM

## 2017-12-22 RX ORDER — PROPOFOL 10 MG/ML
INJECTION, EMULSION INTRAVENOUS AS NEEDED
Status: DISCONTINUED | OUTPATIENT
Start: 2017-12-22 | End: 2017-12-22 | Stop reason: SURG

## 2017-12-22 RX ORDER — FENTANYL CITRATE 50 UG/ML
INJECTION, SOLUTION INTRAMUSCULAR; INTRAVENOUS AS NEEDED
Status: DISCONTINUED | OUTPATIENT
Start: 2017-12-22 | End: 2017-12-22 | Stop reason: SURG

## 2017-12-22 RX ADMIN — PROPOFOL 200 MG: 10 INJECTION, EMULSION INTRAVENOUS at 15:43

## 2017-12-22 RX ADMIN — SUCCINYLCHOLINE CHLORIDE 100 MG: 20 INJECTION, SOLUTION INTRAMUSCULAR; INTRAVENOUS at 15:43

## 2017-12-22 RX ADMIN — FENTANYL CITRATE 50 MCG: 50 INJECTION, SOLUTION INTRAMUSCULAR; INTRAVENOUS at 15:52

## 2017-12-22 RX ADMIN — SODIUM CHLORIDE: 0.9 INJECTION, SOLUTION INTRAVENOUS at 15:39

## 2017-12-22 RX ADMIN — LIDOCAINE HYDROCHLORIDE 50 MG: 10 INJECTION, SOLUTION INFILTRATION; PERINEURAL at 15:43

## 2017-12-22 RX ADMIN — FENTANYL CITRATE 50 MCG: 50 INJECTION, SOLUTION INTRAMUSCULAR; INTRAVENOUS at 15:43

## 2017-12-22 RX ADMIN — ONDANSETRON 4 MG: 2 INJECTION INTRAMUSCULAR; INTRAVENOUS at 15:52

## 2017-12-22 RX ADMIN — SODIUM CHLORIDE 125 ML/HR: 0.9 INJECTION, SOLUTION INTRAVENOUS at 13:49

## 2017-12-22 NOTE — CONSULTS
Consultation -  [facility]  Rom Oquendo  69 y o  male MRN: [id number]  Unit/Bed#: ED 03 Encounter: [id number]        Consults    Reason for Consult / Principal Problem: esophageal dysphagia    ASSESSMENT and PLAN:    Principal Problem:    Esophageal dysphagia    #1  Esophageal dysphagia with esophageal stent migration: patient noted to have stent within stomach on barium swallow today  Has been having dysphagia for last few days  Has not started chemotherapy yet  -NPO  -Plan for emergent endoscopy in OR today with stent retrieval and new stent placement in esophagus  -------------------------------------------------------------------------------------------------------------------    HPI: This is a 69year old male with a hx of HTN, HLD, GERD, cardiac disease, atrial fibrillation, on Xarelto and arthritis who presented for an outpatient barium swallow today when he was noted to have difficulty swallowing the last three days  He had a stent placed in the esophagus on 11/17 secondary to an obstructing esophageal tumor  The barium swallow showed migration of the stent into the stomach  He has been tolerating liquids and some soft foods the last few days  He only vomited once yesterday morning when attempting to eat a donut  Denies abdominal pain or changes in bowel habits  Last oral intake last night aside from barium during barium swallow today  REVIEW OF SYSTEMS:    CONSTITUTIONAL: Denies any fever, chills, or rigors  Good appetite, and no recent weight loss  HEENT: No earache or tinnitus  Denies hearing loss or visual disturbances  CARDIOVASCULAR: No chest pain or palpitations  RESPIRATORY: Denies any cough, hemoptysis, shortness of breath or dyspnea on exertion  GASTROINTESTINAL: As noted in the History of Present Illness  GENITOURINARY: No problems with urination  Denies any hematuria or dysuria  NEUROLOGIC: No dizziness or vertigo, denies headaches     MUSCULOSKELETAL: Denies any muscle or joint pain  SKIN: Denies skin rashes or itching  ENDOCRINE: Denies excessive thirst  Denies intolerance to heat or cold  PSYCHOSOCIAL: Denies depression or anxiety  Denies any recent memory loss  Historical Information   Past Medical History:   Diagnosis Date    Anxiety     Arthritis     Atrial fibrillation ([address] )     h/o atrial fib/ flutter    Cardiac disease     Disease of thyroid gland     Dysphagia     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    GERD (gastroesophageal reflux disease)     Hernia, abdominal     chronic umbilical    Hyperlipidemia     Hypertension      Past Surgical History:   Procedure Laterality Date    CARDIAC PACEMAKER PLACEMENT      CARDIAC PACEMAKER PLACEMENT Left     placed 25 years ago    ESOPHAGOGASTRODUODENOSCOPY N/A 10/31/2017    Procedure: ESOPHAGOGASTRODUODENOSCOPY (EGD); Surgeon: Lawrence Hernández MD;  Location: [facility]; Service: Gastroenterology    ESOPHAGOGASTRODUODENOSCOPY N/A 11/17/2017    Procedure: ESOPHAGOGASTRODUODENOSCOPY (EGD) WITH STENT;  Surgeon: Lawrence Hernández MD;  Location[facility];  Service: Gastroenterology    FRACTURE SURGERY      LEG SURGERY      TIBIA FRACTURE SURGERY      orif left leg 1986     Social History   History   Alcohol Use No     History   Drug Use No     History   Smoking Status    Former Smoker    Packs/day: 1 00    Types: Cigarettes    Quit date: 11/16/1997   Smokeless Tobacco    Never Used     History reviewed  No pertinent family history  Meds/Allergies       (Not in a hospital admission)  No current facility-administered medications for this encounter  No Known Allergies        Objective     Blood pressure 163/81, pulse 70, temperature 98 2 °F (36 8 °C), temperature source Tympanic, resp  rate 20, height 5' 7" (1 702 m), weight 116 kg (255 lb), SpO2 96 %      No intake or output data in the 24 hours ending 12/22/17 1537      PHYSICAL EXAM:      General Appearance:   Alert, cooperative, no distress, appears stated age    HEENT:   Normocephalic, atraumatic, anicteric, no oropharyngeal thrush present      Neck:  Supple, symmetrical, trachea midline, no adenopathy;    thyroid: no enlargement/tenderness/nodules; no carotid  bruit or JVD    Lungs:   Clear to auscultation bilaterally; no rales, rhonchi or wheezing; respirations unlabored    Heart[de-identified]   S1 and S2 normal; regular rate and rhythm; no murmur, rub, or gallop  Abdomen:   Soft, non-tender, non-distended; normal bowel sounds; no masses, no organomegaly    Genitalia:   Deferred    Rectal:   Deferred    Extremities:  No cyanosis, clubbing or edema    Pulses:  2+ and symmetric all extremities    Skin:  Skin color, texture, turgor normal, no rashes or lesions    Lymph nodes:  No palpable cervical, axillary or inguinal lymphadenopathy        Lab Results:     Results from last 7 days  Lab Units 12/22/17  1348   WBC Thousand/uL 6 20   HEMOGLOBIN g/dL 11 8*   HEMATOCRIT % 36 3*   PLATELETS Thousands/uL 216   NEUTROS PCT % 66   LYMPHS PCT % 22   MONOS PCT % 6   EOS PCT % 5       Results from last 7 days  Lab Units 12/22/17  1348   SODIUM mmol/L 141   POTASSIUM mmol/L 4 2   CHLORIDE mmol/L 104   CO2 mmol/L 26   BUN mg/dL 21   CREATININE mg/dL 1 72*   CALCIUM mg/dL 8 8   TOTAL PROTEIN g/dL 8 0   BILIRUBIN TOTAL mg/dL 0 20   ALK PHOS U/L 55   ALT U/L 25   AST U/L 16   GLUCOSE RANDOM mg/dL 98           Results from last 7 days  Lab Units 12/22/17  1348   LIPASE u/L 232       Imaging Studies: I have personally reviewed pertinent imaging studies  Fl Barium Swallow    Result Date: 12/22/2017  Impression: Migration of esophageal stent into the stomach beyond the distal esophageal carcinoma  These findings were discussed with the patient's caregiver at the time of the study by the radiology technologist  Workstation performed: [id number]           Patient was seen and examined by Dr Nasir Quijano  All key medical decisions were made by Dr Nasir Quijano  Thank you for allowing us to participate in the care of this present patient  We will follow-up with you closely

## 2017-12-22 NOTE — ANESTHESIA PREPROCEDURE EVALUATION
Review of Systems/Medical History  Patient summary reviewed  Chart reviewed  No history of anesthetic complications     Cardiovascular  Pacemaker/AICD, Hyperlipidemia, Hypertension , CAD, , Dysrhythmias, atrial flutter and atrial fibrillation, CHF (Cardiomyopathy) ,   Comment: LEFT VENTRICLE:  Systolic function was mildly reduced by visual assessment  Ejection fraction was estimated in the range of 45 % to 50 %  There was mild diffuse hypokinesis with no identifiable regional variations  There was mild concentric hypertrophy  Doppler parameters were consistent with elevated mean left atrial filling pressure      VENTRICULAR SEPTUM:  There was paradoxical motion  These changes are consistent with right ventricular pacing      MITRAL VALVE:  There was mild regurgitation      AORTIC VALVE:  There was no evidence for stenosis  There was no regurgitation      TRICUSPID VALVE:  There was mild regurgitation  Pulmonary artery systolic pressure was within the normal range    ,  Pulmonary  Smoker ex-smoker , ,        GI/Hepatic    GERD , Esophageal disease (s/p stent now with migration to stomach) esophageal cancer,             Endo/Other  History of thyroid disease , hypothyroidism, Arthritis     GYN       Hematology   Musculoskeletal  Obesity  morbid obesity,        Neurology   Psychology   Anxiety,            Physical Exam    Airway    Mallampati score: II  TM Distance: >3 FB  Neck ROM: full     Dental   No notable dental hx     Cardiovascular  Rhythm: regular, Rate: normal, Cardiovascular exam normal    Pulmonary  Pulmonary exam normal Breath sounds clear to auscultation,     Other Findings  No teeth      Anesthesia Plan  ASA Score- 4     Anesthesia Type- general with ASA Monitors  Additional Monitors:   Airway Plan: ETT  Plan Factors-    Induction- intravenous  Postoperative Plan-     Informed Consent- Anesthetic plan and risks discussed with patient    I personally reviewed this patient with the CRNA  Discussed and agreed on the Anesthesia Plan with the SILVA Duenas

## 2017-12-22 NOTE — ANESTHESIA POSTPROCEDURE EVALUATION
Post-Op Assessment Note      CV Status:  Stable    Mental Status:  Awake    Hydration Status:  Stable    PONV Controlled:  None    Airway Patency:  Patent    Post Op Vitals Reviewed: Yes          Staff: Anesthesiologist           /95 (12/22/17 1620)    Temp      Pulse 89 (12/22/17 1620)   Resp 16 (12/22/17 1620)    SpO2

## 2017-12-22 NOTE — OP NOTE
OPERATIVE REPORT  PATIENT NAME: Chong Cole Sr     :  1940  MRN: [id number]  Pt Location: [facility] OR ROOM 02    SURGERY DATE: 2017    Surgeon(s) and Role:     * Mona Bradley MD - Primary    ESOPHAGOGASTRODUODENOSCOPY    PROCEDURE: EGD    SEDATION: Monitored anesthesia care, check anesthesia records    ASA Class: 3    INDICATIONS: Migrated esophageal stent    CONSENT:  Informed consent was obtained for the procedure, including sedation after explaining the risks and benefits of the procedure  Risks including but not limited to bleeding, perforation, infection, and missed lesion  PREPARATION:   Telemetry, pulse oximetry, blood pressure were monitored throughout the procedure  Patient was identified by myself both verbally and by visual inspection of ID band  DESCRIPTION:   Patient was placed in the left lateral decubitus position and was sedated with the above medication  The gastroscope was introduced in to the oropharynx and the esophagus was intubated under direct visualization  Scope was passed down the esophagus up to 2nd part of the duodenum  A careful inspection was made as the gastroscope was withdrawn, including a retroflexed view of the stomach; findings and interventions are described below  FINDINGS:    #1  Esophagus- There was a malignant stricture in the distal esophagus  #2  Stomach- Stent was seen in the body of the stomach  A rat tooth forceps was used to grasp the string and pull it and the proximal part of the stent into the scope  Then the stent was removed  Relook showed a small amount of oozing but no gross bleeding  #3  Duodenum- Normal          IMPRESSIONS:      Esophageal stent removed with rat tooth forceps  RECOMMENDATIONS:     Resume soft diet  Continue Xarelto  If bleeding, pain, or fever, return to the hospital     COMPLICATIONS:  None; patient tolerated the procedure well            DISPOSITION: PACU           CONDITION: Stable      SIGNATURE: Janee Wilson Sivan Cochran MD  DATE: December 22, 2017  TIME: 4:16 PM

## 2017-12-22 NOTE — ED PROVIDER NOTES
History  Chief Complaint   Patient presents with    Difficulty Swallowing     pt sent from Radiology for stent displacement, history cancer  pt c/o being "unable to eat" certain foods  Patient presents for evaluation from outpatient radiology after being evaluated for difficulty swallowing  Patient has history of esophageal cancer with stent placement  The barium swallow today showed stent migration to the stomach  History provided by:  Patient   used: No        Prior to Admission Medications   Prescriptions Last Dose Informant Patient Reported? Taking? Multiple Vitamin (MULTIVITAMIN) tablet 12/21/2017 at 0800  Yes Yes   Sig: Take 1 tablet by mouth daily   Omega-3-Acid Eth Est, Dietary, 1 g CAPS 12/21/2017 at 0800  Yes Yes   Sig: Take by mouth   amiodarone 200 mg tablet 12/21/2017 at 0800  No Yes   Sig: Take 1 tablet by mouth 2 (two) times a day   aspirin (ASPIRIN LOW DOSE) 81 MG tablet 12/21/2017 at 0800  Yes Yes   Sig: Take by mouth   furosemide (LASIX) 40 mg tablet 12/21/2017 at 0800  Yes Yes   Sig: Take by mouth daily     levothyroxine 25 mcg tablet 12/21/2017 at 0800  Yes Yes   Sig: Take 25 mcg by mouth daily     lisinopril (ZESTRIL) 5 mg tablet 12/21/2017 at 0800  Yes Yes   Sig: Take 5 mg by mouth     metoprolol succinate (TOPROL-XL) 50 mg 24 hr tablet 12/21/2017 at 0800  Yes Yes   Sig: Take 50 mg by mouth daily Takes 3 tabs    omeprazole (PriLOSEC) 40 MG capsule 12/21/2017 at 0800 time  Yes Yes   Sig: Take by mouth daily     pravastatin (PRAVACHOL) 80 mg tablet 12/21/2017 at 0800  Yes Yes   Sig: Take by mouth daily     rivaroxaban (XARELTO) 20 mg tablet 12/21/2017 at 0800  No Yes   Sig: Take 1 tablet by mouth daily with breakfast   Patient taking differently: Take 20 mg by mouth daily with breakfast Last dose end of oct      sertraline (ZOLOFT) 50 mg tablet 12/21/2017 at 0800  Yes Yes   Sig: Take 50 mg by mouth daily     sucralfate (CARAFATE) 1 g/10 mL suspension 12/21/2017 at 2200  No Yes   Sig: Take 10 mL by mouth 4 (four) times a day (before meals and at bedtime)   traZODone (DESYREL) 150 mg tablet 12/21/2017 at 2200  Yes Yes   Sig: Take 150 mg by mouth daily at bedtime        Facility-Administered Medications: None       Past Medical History:   Diagnosis Date    Anxiety     Arthritis     Atrial fibrillation (HCC)     h/o atrial fib/ flutter    Cardiac disease     Disease of thyroid gland     Dysphagia     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    GERD (gastroesophageal reflux disease)     Hernia, abdominal     chronic umbilical    Hyperlipidemia     Hypertension        Past Surgical History:   Procedure Laterality Date    CARDIAC PACEMAKER PLACEMENT      CARDIAC PACEMAKER PLACEMENT Left     placed 25 years ago    ESOPHAGOGASTRODUODENOSCOPY N/A 10/31/2017    Procedure: ESOPHAGOGASTRODUODENOSCOPY (EGD); Surgeon: Amber Campbell MD;  Location: [facility]; Service: Gastroenterology    ESOPHAGOGASTRODUODENOSCOPY N/A 11/17/2017    Procedure: ESOPHAGOGASTRODUODENOSCOPY (EGD) WITH STENT;  Surgeon: Amber Campbell MD;  Location: [facility];  Service: Gastroenterology    FRACTURE SURGERY      LEG SURGERY      TIBIA FRACTURE SURGERY      orif left leg 1986       History reviewed  No pertinent family history  I have reviewed and agree with the history as documented  Social History   Substance Use Topics    Smoking status: Former Smoker     Packs/day: 1 00     Types: Cigarettes     Quit date: 11/16/1997    Smokeless tobacco: Never Used    Alcohol use No        Review of Systems   HENT: Positive for trouble swallowing  All other systems reviewed and are negative        Physical Exam  ED Triage Vitals [12/22/17 1256]   Temperature Pulse Respirations Blood Pressure SpO2   97 6 °F (36 4 °C) 69 18 159/72 98 %      Temp Source Heart Rate Source Patient Position - Orthostatic VS BP Location FiO2 (%)   Tympanic Monitor Sitting Right arm --      Pain Score       No Pain           Orthostatic Vital Signs  Vitals:    12/22/17 1256 12/22/17 1420 12/22/17 1620 12/22/17 1630   BP: 159/72 163/81 156/95 150/65   Pulse: 69 70 89 70   Patient Position - Orthostatic VS: Sitting          Physical Exam   Constitutional: He is oriented to person, place, and time  No distress  HENT:   Mouth/Throat: Oropharynx is clear and moist    Eyes: Pupils are equal, round, and reactive to light  Neck: Normal range of motion  Cardiovascular: Normal rate, regular rhythm and intact distal pulses  Pulmonary/Chest: Effort normal and breath sounds normal  No respiratory distress  Abdominal: Soft  There is no tenderness  Musculoskeletal: Normal range of motion  Neurological: He is alert and oriented to person, place, and time  Skin: Capillary refill takes less than 2 seconds  He is not diaphoretic  Nursing note and vitals reviewed  ED Medications  Medications   fentaNYL (SUBLIMAZE) injection 25 mcg (not administered)   ondansetron (ZOFRAN) injection 4 mg (not administered)   promethazine (PHENERGAN) injection 12 5 mg (not administered)       Diagnostic Studies  Results Reviewed     Procedure Component Value Units Date/Time    Comprehensive metabolic panel [[id number]]  (Abnormal) Collected:  12/22/17 1348    Lab Status:  Final result Specimen:  Blood from Arm, Right Updated:  12/22/17 1411     Sodium 141 mmol/L      Potassium 4 2 mmol/L      Chloride 104 mmol/L      CO2 26 mmol/L      Anion Gap 11 mmol/L      BUN 21 mg/dL      Creatinine 1 72 (H) mg/dL      Glucose 98 mg/dL      Calcium 8 8 mg/dL      AST 16 U/L      ALT 25 U/L      Alkaline Phosphatase 55 U/L      Total Protein 8 0 g/dL      Albumin 3 4 (L) g/dL      Total Bilirubin 0 20 mg/dL      eGFR 38 ml/min/1 73sq m     Narrative:         National Kidney Disease Education Program recommendations are as follows:  GFR calculation is accurate only with a steady state creatinine  Chronic Kidney disease less than 60 ml/min/1 73 sq  meters  Kidney failure less than 15 ml/min/1 73 sq  meters  Lipase [[id number]]  (Normal) Collected:  12/22/17 1348    Lab Status:  Final result Specimen:  Blood from Arm, Right Updated:  12/22/17 1411     Lipase 232 u/L     CBC and differential [[id number]]  (Abnormal) Collected:  12/22/17 1348    Lab Status:  Final result Specimen:  Blood from Arm, Right Updated:  12/22/17 1356     WBC 6 20 Thousand/uL      RBC 4 05 (L) Million/uL      Hemoglobin 11 8 (L) g/dL      Hematocrit 36 3 (L) %      MCV 90 fL      MCH 29 1 pg      MCHC 32 5 g/dL      RDW 15 3 (H) %      MPV 7 1 (L) fL      Platelets 933 Thousands/uL      nRBC 0 /100 WBCs      Neutrophils Relative 66 %      Lymphocytes Relative 22 %      Monocytes Relative 6 %      Eosinophils Relative 5 %      Basophils Relative 1 %      Neutrophils Absolute 4 10 Thousands/µL      Lymphocytes Absolute 1 40 Thousands/µL      Monocytes Absolute 0 40 Thousand/µL      Eosinophils Absolute 0 30 Thousand/µL      Basophils Absolute 0 00 Thousands/µL                  No orders to display              Procedures  Procedures       Phone Contacts  ED Phone Contact    ED Course  ED Course                                MDM  Number of Diagnoses or Management Options  Dysphagia:   Diagnosis management comments: Pulse ox 96% on RA indicating adequate oxygenation    Marielos ALVARES for GI called and case discussed  Wanted patient admitted for stent revision  Called back and was able to get OR this afternoon, will go from here to OR for procedure and will be discharged from there after the procedure          Amount and/or Complexity of Data Reviewed  Clinical lab tests: ordered and reviewed  Discuss the patient with other providers: yes    Patient Progress  Patient progress: stable    CritCare Time    Disposition  Final diagnoses:   Dysphagia     Time reflects when diagnosis was documented in both MDM as applicable and the Disposition within this note     Time User Action Codes Description Comment    12/22/2017  1:11 PM Del Gum Add [R13 10] Esophageal dysphagia     12/22/2017  1:11 PM Del Gum Modify [R13 10] Esophageal dysphagia     12/22/2017  1:59 PM Geraldine Santiago Add [R13 10] Dysphagia       ED Disposition     ED Disposition Condition Comment    Send to OR        Follow-up Information    None       Discharge Medication List as of 12/22/2017  4:33 PM      CONTINUE these medications which have NOT CHANGED    Details   amiodarone 200 mg tablet Take 1 tablet by mouth 2 (two) times a day, Starting Fri 9/1/2017, No Print      aspirin (ASPIRIN LOW DOSE) 81 MG tablet Take by mouth, Historical Med      furosemide (LASIX) 40 mg tablet Take by mouth daily  , Starting Wed 7/19/2017, Historical Med      levothyroxine 25 mcg tablet Take 25 mcg by mouth daily  , Starting Fri 9/2/2016, Historical Med      lisinopril (ZESTRIL) 5 mg tablet Take 5 mg by mouth  , Starting Thu 8/4/2016, Historical Med      metoprolol succinate (TOPROL-XL) 50 mg 24 hr tablet Take 50 mg by mouth daily Takes 3 tabs , Starting Wed 8/23/2017, Historical Med      Multiple Vitamin (MULTIVITAMIN) tablet Take 1 tablet by mouth daily, Historical Med      Omega-3-Acid Eth Est, Dietary, 1 g CAPS Take by mouth, Starting Mon 1/23/2017, Historical Med      omeprazole (PriLOSEC) 40 MG capsule Take by mouth daily  , Starting Thu 3/24/2016, Historical Med      pravastatin (PRAVACHOL) 80 mg tablet Take by mouth daily  , Starting Tue 3/28/2017, Historical Med      rivaroxaban (XARELTO) 20 mg tablet Take 1 tablet by mouth daily with breakfast, Starting Fri 9/1/2017, Print      sertraline (ZOLOFT) 50 mg tablet Take 50 mg by mouth daily  , Starting Wed 7/19/2017, Historical Med      sucralfate (CARAFATE) 1 g/10 mL suspension Take 10 mL by mouth 4 (four) times a day (before meals and at bedtime), Starting Wed 11/29/2017, Print      traZODone (DESYREL) 150 mg tablet Take 150 mg by mouth daily at bedtime  , Starting Tue 6/6/2017, Historical Med No discharge procedures on file      ED Provider  Electronically Signed by           Ac Christianson DO  12/22/17 6419

## 2018-01-01 ENCOUNTER — GENERIC CONVERSION - ENCOUNTER (OUTPATIENT)
Dept: OTHER | Facility: OTHER | Age: 78
End: 2018-01-01

## 2018-01-08 ENCOUNTER — GENERIC CONVERSION - ENCOUNTER (OUTPATIENT)
Dept: GASTROENTEROLOGY | Facility: CLINIC | Age: 78
End: 2018-01-08

## 2018-01-09 NOTE — MISCELLANEOUS
Provider Comments  Provider Comments:   CALLED PT AND HE SAID HE FORGOT ABOUT APPT  HE DIDN'T WANT TO RESCHEDULE BECAUSE HE IS LEAVING FOR [place] TOMORROW        Signatures   Electronically signed by : CHUCHO Bradford ; Apr 6 2017  1:44AM EST                       (Author)

## 2018-01-10 NOTE — RESULT NOTES
Verified Results  [facility] BARIUM SWALLOW [id number] 10:25AM Virginia Kidd Order Number: [id number]    - Patient Instructions: To schedule this appointment, please contact Central Scheduling at [phone]  Test Name Result Flag Reference   FL ESOPHAGRAM COMPLETE (Report)     BARIUM SWALLOW-ESOPHAGRAM     INDICATION: Food gets stuck in the mid chest      COMPARISON: None     IMAGES: 44     FLUOROSCOPY TIME:  2 2 min      TECHNIQUE:   The patient was given effervescent granules and barium by mouth and images of the esophagus were obtained  FINDINGS:     The proximal esophagus is normal  There is an area of mucosal irregularity and narrowing in the distal esophagus at the EG junction  The esophageal lumen is narrowed and the mucosa appears irregular  The barium does pass through this region into the    stomach  Esophageal motility is normal      The vallecula and piriform sinuses are normal and there is no abnormality at the base of the tongue  Gastroesophageal reflux was not observed  A defibrillator device is present  IMPRESSION:   IMPRESSION:      The distal esophagus is abnormal  The lumen is narrowed and the mucosa appears irregular  Findings are concerning for a mass in this region  Carcinoma of the esophagus 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

## 2018-01-10 NOTE — MISCELLANEOUS
Message   Recorded as Task   Date: 02/09/2016 12:01 PM, Created By: Deandre Humphries   Task Name: Med Renewal Request   Assigned To: Linda Navarro   Regarding Patient: Jeanne Lundborg, Status: Active   CommentVivian Nepalese - 09 Feb 2016 12:01 PM     TASK CREATED  Caller: [address]; Renew Medication; [phone]  DR WHITMORE - PHARM IS CALLING FOR PRIOR AUTH FOR TORSEMIDE  THEY FAXED A FORM FOR YOU TO FILL OUT  CALL [phone]  PT IS OUT AND NEEDS THIS TO BE DONE RIGHT AWAY  ZEE FROM PHARM WANTS TO SPEAK TO YOU  SHE SAID PT IS VERY CONFUSED  Margret Cat - 09 Feb 2016 12:36 PM     TASK REASSIGNED: Previously Assigned To [employer],team   Anmolsingh,Margret - 11 Feb 2016 4:16 PM     TASK REASSIGNED: Previously Assigned To Eddie Gee - 11 Feb 2016 6:53 PM     TASK EDITED  I don't have the faxed form  I did speak to the pt this evening  See note   Spoke to pt  Says that leg swelling has gone down but still has R thigh pain  Has been taking Torsemide 80 mg  Cr has increased from 1 57 to 2 72  Pt denies confusion  He lives with his son and daughter in law who keep an eye on him and will be able to tell if if pt develops confusion  Pt has appt with me tomorrow  Signatures   Electronically signed by :  CHUCHO Rodgers ; Feb 11 2016  6:55PM EST                       (Author)

## 2018-01-10 NOTE — RESULT NOTES
Verified Results  (1) COMPREHENSIVE METABOLIC PANEL [id number] 68:75ER Marylou Sandy     Test Name Result Flag Reference   ALT (SGPT) 20 IU/L  0-44   AST (SGOT) 21 IU/L  0-40   Alkaline Phosphatase, S 70 IU/L     Bilirubin, Total 0 4 mg/dL  0 0-1 2   A/G Ratio 1 3  1 2-2 2   **Please note reference interval change**   Globulin, Total 3 1 g/dL  1 5-4 5   Albumin, Serum 3 9 g/dL  3 5-4 8   Protein, Total, Serum 7 0 g/dL  6 0-8 5   Calcium, Serum 9 0 mg/dL  8 6-10 2   Carbon Dioxide, Total 26 mmol/L  18-29   Chloride, Serum 98 mmol/L     Potassium, Serum 4 1 mmol/L  3 5-5 2   Sodium, Serum 140 mmol/L  134-144   BUN/Creatinine Ratio 18  10-22   eGFR If Africn Am 54 mL/min/1 73 L >59   eGFR If NonAfricn Am 47 mL/min/1 73 L >59   Creatinine, Serum 1 45 mg/dL H 0 76-1 27   BUN 26 mg/dL  8-27   Glucose, Serum 122 mg/dL H 65-99     (1) HEMOGLOBIN A1C 22Mar2017 08:31AM [facility]     Test Name Result Flag Reference   Hemoglobin A1c 5 7 % H 4 8-5 6   Pre-diabetes: 5 7 - 6 4           Diabetes: >6 4           Glycemic control for adults with diabetes: <7 0     (1) LIPID PANEL, FASTING [id number] 08:31AM [facility]     Test Name Result Flag Reference   T  Chol/HDL Ratio 5 6 ratio units H 0 0-5 0   T  Chol/HDL Ratio                                                             Men  Women                                               1/2 Avg  Risk  3 4    3 3                                                   Avg Risk  5 0    4 4                                                2X Avg  Risk  9 6    7 1                                                3X Avg  Risk 23 4   11 0   LDL Cholesterol Calc 96 mg/dL  0-99   VLDL Cholesterol Dario 65 mg/dL H 5-40   HDL Cholesterol 35 mg/dL L >39   Triglycerides 325 mg/dL H 0-149   Cholesterol, Total 196 mg/dL  100-199     (1) CBC/ PLT (NO DIFF) [id number] 08:31AM [facility]     Test Name Result Flag Reference   Platelets 911 X48B8/QG  150-379   RDW 13 9 %  12 3-15 4   MCHC 33 5 g/dL  31 5-35 7   MCH 29 5 pg  26 6-33 0   MCV 88 fL  79-97   Hematocrit 40 3 %  37 5-51 0   Hemoglobin 13 5 g/dL  12 6-17 7   RBC 4 57 x10E6/uL  4 14-5 80   WBC 10 8 x10E3/uL  3 4-10 8     (1) TSH [id number] 08:31AM [facility]     Test Name Result Flag Reference   TSH 1 490 uIU/mL  0 450-4 500

## 2018-01-11 ENCOUNTER — GENERIC CONVERSION - ENCOUNTER (OUTPATIENT)
Dept: OTHER | Facility: OTHER | Age: 78
End: 2018-01-11

## 2018-01-11 ENCOUNTER — GENERIC CONVERSION - ENCOUNTER (OUTPATIENT)
Dept: GASTROENTEROLOGY | Facility: CLINIC | Age: 78
End: 2018-01-11

## 2018-01-11 ENCOUNTER — HOSPITAL ENCOUNTER (OUTPATIENT)
Dept: RADIOLOGY | Facility: HOSPITAL | Age: 78
Setting detail: OUTPATIENT SURGERY
Discharge: HOME | End: 2018-01-11
Payer: MEDICARE

## 2018-01-11 ENCOUNTER — HOSPITAL ENCOUNTER (OUTPATIENT)
Facility: HOSPITAL | Age: 78
Setting detail: OUTPATIENT SURGERY
Discharge: HOME | End: 2018-01-11
Attending: INTERNAL MEDICINE | Admitting: INTERNAL MEDICINE
Payer: MEDICARE

## 2018-01-11 ENCOUNTER — ANESTHESIA (OUTPATIENT)
Dept: PERIOP | Facility: HOSPITAL | Age: 78
End: 2018-01-11
Payer: MEDICARE

## 2018-01-11 ENCOUNTER — ANESTHESIA EVENT (OUTPATIENT)
Dept: PERIOP | Facility: HOSPITAL | Age: 78
End: 2018-01-11
Payer: MEDICARE

## 2018-01-11 VITALS
TEMPERATURE: 97.3 F | RESPIRATION RATE: 18 BRPM | HEART RATE: 70 BPM | SYSTOLIC BLOOD PRESSURE: 153 MMHG | DIASTOLIC BLOOD PRESSURE: 70 MMHG | OXYGEN SATURATION: 94 %

## 2018-01-11 DIAGNOSIS — C15.9 MALIGNANT NEOPLASM OF ESOPHAGUS (HCC): ICD-10-CM

## 2018-01-11 PROCEDURE — C1874 STENT, COATED/COV W/DEL SYS: HCPCS | Performed by: INTERNAL MEDICINE

## 2018-01-11 PROCEDURE — 71045 X-RAY EXAM CHEST 1 VIEW: CPT

## 2018-01-11 PROCEDURE — C1769 GUIDE WIRE: HCPCS | Performed by: INTERNAL MEDICINE

## 2018-01-11 DEVICE — STENT SYSTEM
Type: IMPLANTABLE DEVICE | Site: ESOPHAGUS | Status: FUNCTIONAL
Brand: WALLFLEX™ ESOPHAGEAL

## 2018-01-11 RX ORDER — SUCCINYLCHOLINE CHLORIDE 20 MG/ML
INJECTION INTRAMUSCULAR; INTRAVENOUS AS NEEDED
Status: DISCONTINUED | OUTPATIENT
Start: 2018-01-11 | End: 2018-01-11 | Stop reason: SURG

## 2018-01-11 RX ORDER — LIDOCAINE HYDROCHLORIDE 10 MG/ML
INJECTION, SOLUTION INFILTRATION; PERINEURAL AS NEEDED
Status: DISCONTINUED | OUTPATIENT
Start: 2018-01-11 | End: 2018-01-11 | Stop reason: SURG

## 2018-01-11 RX ORDER — PROPOFOL 10 MG/ML
INJECTION, EMULSION INTRAVENOUS AS NEEDED
Status: DISCONTINUED | OUTPATIENT
Start: 2018-01-11 | End: 2018-01-11 | Stop reason: SURG

## 2018-01-11 RX ORDER — FENTANYL CITRATE 50 UG/ML
INJECTION, SOLUTION INTRAMUSCULAR; INTRAVENOUS AS NEEDED
Status: DISCONTINUED | OUTPATIENT
Start: 2018-01-11 | End: 2018-01-11 | Stop reason: SURG

## 2018-01-11 RX ORDER — ONDANSETRON 2 MG/ML
INJECTION INTRAMUSCULAR; INTRAVENOUS AS NEEDED
Status: DISCONTINUED | OUTPATIENT
Start: 2018-01-11 | End: 2018-01-11 | Stop reason: SURG

## 2018-01-11 RX ADMIN — ONDANSETRON 4 MG: 2 INJECTION INTRAMUSCULAR; INTRAVENOUS at 13:43

## 2018-01-11 RX ADMIN — LIDOCAINE HYDROCHLORIDE 50 MG: 10 INJECTION, SOLUTION INFILTRATION; PERINEURAL at 13:34

## 2018-01-11 RX ADMIN — FENTANYL CITRATE 100 MCG: 50 INJECTION, SOLUTION INTRAMUSCULAR; INTRAVENOUS at 13:34

## 2018-01-11 RX ADMIN — SUCCINYLCHOLINE CHLORIDE 100 MG: 20 INJECTION, SOLUTION INTRAMUSCULAR; INTRAVENOUS at 13:34

## 2018-01-11 RX ADMIN — PROPOFOL 70 MG: 10 INJECTION, EMULSION INTRAVENOUS at 13:34

## 2018-01-11 NOTE — PERIOPERATIVE NURSING NOTE
1250pm-Patient reported during pre-op interview with writer, that he took his Xarelto and ASA on 1/10/18 at 0830 dr To Rai was made aware, orders received to continue with intake and procedure at this time  Receiving intra-op RN Aguila Welch)

## 2018-01-11 NOTE — DISCHARGE INSTRUCTIONS
Discharge home  Clear liquid diet today  Twice daily PPI therapy, Phenergan as needed  Full liquid diet tomorrow  Resume Xarelto in 2 days (saturday)  There after soft foods only such as ground meats, avoid all breads, no hard vegetables or fruits  Follow up with radiation oncologist

## 2018-01-11 NOTE — H&P
History and Physical - [facility]  Nitamariah Santos Sr  69 y o  male MRN: [id number]    HPI: Alisha Malloy  is a 69y o  year old male who presents with obstructing esophageal cancer, for stent  Review of Systems    Historical Information   Past Medical History:   Diagnosis Date    Anxiety     Arthritis     Atrial fibrillation ([address] )     h/o atrial fib/ flutter    Cardiac disease     Disease of thyroid gland     Dysphagia     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    GERD (gastroesophageal reflux disease)     Hernia, abdominal     chronic umbilical    Hyperlipidemia     Hypertension      Past Surgical History:   Procedure Laterality Date    CARDIAC PACEMAKER PLACEMENT      pacemaker/defibrilator    CARDIAC PACEMAKER PLACEMENT Left     placed 25 years ago    ESOPHAGOGASTRODUODENOSCOPY N/A 10/31/2017    Procedure: ESOPHAGOGASTRODUODENOSCOPY (EGD); Surgeon: Antionette Anderson MD;  Location: [facility]; Service: Gastroenterology    ESOPHAGOGASTRODUODENOSCOPY N/A 11/17/2017    Procedure: ESOPHAGOGASTRODUODENOSCOPY (EGD) WITH STENT;  Surgeon: Antionette Anderson MD;  Location[facility];  Service: Gastroenterology    ESOPHAGOGASTRODUODENOSCOPY N/A 12/22/2017    Procedure: ESOPHAGOGASTRODUODENOSCOPY (EGD) with stent retrieval;  Surgeon: Kolton Hilario MD;  Location: [address] [place] [address];  Service: Gastroenterology    FRACTURE SURGERY      LEG SURGERY      TIBIA FRACTURE SURGERY      orif left leg 1986     Social History   History   Alcohol Use No     History   Drug Use No     History   Smoking Status    Former Smoker    Packs/day: 1 00    Types: Cigarettes    Quit date: 11/16/1997   Smokeless Tobacco    Never Used     History reviewed  No pertinent family history      Meds/Allergies     Prescriptions Prior to Admission   Medication    amiodarone 200 mg tablet    aspirin (ASPIRIN LOW DOSE) 81 MG tablet    furosemide (LASIX) 40 mg tablet    levothyroxine 25 mcg tablet    lisinopril (ZESTRIL) 5 mg tablet    metoprolol succinate (TOPROL-XL) 50 mg 24 hr tablet    Multiple Vitamin (MULTIVITAMIN) tablet    Omega-3-Acid Eth Est, Dietary, 1 g CAPS    omeprazole (PriLOSEC) 40 MG capsule    pravastatin (PRAVACHOL) 80 mg tablet    rivaroxaban (XARELTO) 20 mg tablet    sertraline (ZOLOFT) 50 mg tablet    sucralfate (CARAFATE) 1 g/10 mL suspension    traZODone (DESYREL) 150 mg tablet       No Known Allergies    Objective     Blood pressure 146/65, pulse 69, temperature 98 9 °F (37 2 °C), temperature source Tympanic, resp  rate 20, SpO2 97 %  PHYSICAL EXAM    Gen: NAD, obese  CV: RRR  CHEST: Clear  ABD: soft, NT/ND  EXT: no edema  Neuro: AAO      ASSESSMENT/PLAN:  This is a 69y o  year old male here for obstructing esophageal cancer, for stent         PLAN:   Procedure: egd w stent

## 2018-01-11 NOTE — MISCELLANEOUS
Message  Tried to contact patient multiple times over the last week  Tried emergency contact  Both numbers have message stating provider not available and no voicemail option  Patient recently had barium swallow evalualtion suspicious for distal esophagus mass  Patient will require assessment for malignancy  CT neck/chest/abdomen/pelvis w wo contrast ordered  Referral to GI  Patient has appointment on 11/2  GI was contacted about findings and made aware  EGD was ordered  Notified [facility] office staff to send patient a letter stating that we need to speak with him as soon as possible  Attempted to call patient today same as above        Signatures   Electronically signed by : Yola Lynn MD; Oct 20 2017  5:23PM EST                       (Author)

## 2018-01-11 NOTE — ANESTHESIA PREPROCEDURE EVALUATION
Review of Systems/Medical History  Patient summary reviewed  Chart reviewed  No history of anesthetic complications     Cardiovascular  Pacemaker/AICD, Hyperlipidemia, Hypertension , CAD, , Dysrhythmias, atrial flutter, CHF (cardiomyopathy) ,   Comment: SUMMARY:  -  Stress results: There was normal resting blood pressure with a hypertensive response to stress  There was no chest pain during stress  -  Perfusion imaging: There was a moderate-sized, moderately severe, reversible myocardial perfusion defect of the septal and anterior wall  -  Gated SPECT: The calculated left ventricular ejection fraction was 44 %  There was moderately reduced myocardial thickening and motion of the septal wall of the left ventricle      IMPRESSIONS: Abnormal study after pharmacologic stress  There was a moderate amount of ischemia in the anterior region extending to the septal wall  Left ventricular systolic function was reduced, with regional wall motion abnormalities  CARDIOLOGY Note 12/2017   CHF - ACC/AHA Stage C  S/p ICD with normalization of EF  Continue Metoprolol 50 mg daily along with lisinopril 5 mg daily  Will continue furosemide 40 mg twice a day  2  Status post biventricular pacemaker/ICD -pacemaker interrogation done today showed normal functioning  No further episodes of atrial flutter  3  Dyslipidemia - pravastatin  4  CAD - ASA5  Atrial flutter - continue Amiodarone and Xarelto  Follow up in 4 months  Pacemaker interrogation routinely   ,  Pulmonary       GI/Hepatic    GERD ,             Endo/Other  History of thyroid disease , hypothyroidism, Arthritis     GYN       Hematology   Musculoskeletal  Obesity ,        Neurology   Psychology   Anxiety,            Physical Exam    Airway    Mallampati score: III  TM Distance: >3 FB  Neck ROM: full     Dental   No notable dental hx     Cardiovascular  Cardiovascular exam normal    Pulmonary  Pulmonary exam normal     Other Findings        Anesthesia Plan  ASA Score- 3 Anesthesia Type- IV sedation with anesthesia with ASA Monitors  Additional Monitors:   Airway Plan:         Plan Factors-    Induction-     Postoperative Plan-     Informed Consent- Anesthetic plan and risks discussed with patient  I personally reviewed this patient with the CRNA  Discussed and agreed on the Anesthesia Plan with the CRNA  Yvonne Giraldo

## 2018-01-12 VITALS
TEMPERATURE: 98.3 F | HEIGHT: 66 IN | OXYGEN SATURATION: 95 % | DIASTOLIC BLOOD PRESSURE: 64 MMHG | RESPIRATION RATE: 20 BRPM | SYSTOLIC BLOOD PRESSURE: 144 MMHG | WEIGHT: 270.5 LBS | HEART RATE: 70 BPM | BODY MASS INDEX: 43.47 KG/M2

## 2018-01-12 NOTE — MISCELLANEOUS
Provider Comments  Provider Comments:   no show, called and left message on machine        Signatures   Electronically signed by : CHUCHO Sánchez ; Nov 29 2016 10:39AM EST                       (Author)

## 2018-01-12 NOTE — MISCELLANEOUS
Discussion/Summary  Discussion Summary:   [facility]  Chief Complaint  Chief Complaint Free Text Note Form: Patient has MAY Appointment on 09/13  Feeling better  - patient Cathy Gayle 0276      History of Present Illness  TCM Communication  Luke: The patient is being contacted for follow-up after hospitalization  Hospital records were reviewed  He was hospitalized at [facility]  The date of admission: 08/30/2017, date of discharge: 09/01/2017  Diagnosis: Atrial Flutter  He was discharged to home  Medications reviewed and updated today  He scheduled a follow up appointment  The patient is currently asymptomatic  Communication performed and completed by Efrain Galvez CMA      Active Problems    1  Anxiety disorder (300 00) (F41 9)   2  BMI 45 0-49 9, adult (V85 42) (Z68 42)   3  Bursitis of left shoulder (726 10) (M75 52)   4  Cardiomyopathy (425 4) (I42 9)   5  Chronic combined systolic and diastolic congestive heart failure (428 42,428 0) (I50 42)   6  CKD (chronic kidney disease), stage III (585 3) (N18 3)   7  Coronary artery disease (414 00) (I25 10)   8  Cubital tunnel syndrome on left (354 2) (G56 22)   9  Depression with anxiety (300 4) (F41 8)   10  Diabetes mellitus screening (V77 1) (Z13 1)   11  Diastolic heart failure, NYHA class 1 (428 30) (I50 30)   12  Elevated TSH (794 5) (R94 6)   13  Esophageal reflux (530 81) (K21 9)   14  Gynecomastia, male (611 1) (N62)   15  Hand pain (729 5) (M79 643)   16  Hand tingling (782 0) (R20 2)   17  Hyperkalemia (276 7) (E87 5)   18  Hypertensive Renal Sclerosis (403 90)   19  ICD (implantable cardioverter-defibrillator) in place (V45 02) (Z95 810)   20  Iliotibial band syndrome, left leg (728 89) (M76 32)   21  Insomnia (780 52) (G47 00)   22  Localized, primary osteoarthritis of lower leg, unspecified laterality   23  Mixed hyperlipidemia (272 2) (E78 2)   24  Muscle twitching (781 0) (R25 3)   25  Need for DTaP, hepatitis B, and IPV vaccination (V06 8) (Z23)   26   Need for influenza vaccination (V04 81) (Z23)   27  Need for pneumococcal vaccination (V03 82) (Z23)   28  Need for vaccination with 13-polyvalent pneumococcal conjugate vaccine (V03 82) (Z23)   29  Pacemaker (V45 01) (Z95 0)   30  Peripheral edema (782 3) (R60 9)   31  Prediabetes (790 29) (R73 03)   32  Screening for neurological condition (V80 09) (Z13 89)   33  Subclinical hypothyroidism (244 8) (E03 9)   34  Thyroid disorder screening (V77 0) (Z13 29)    Past Medical History    1  History of Acute thigh pain, right (729 5) (M79 651)   2  History of Anxiety (300 00) (F41 9)   3  History of Benign essential hypertension (401 1) (I10)   4  History of Cardiomyopathy, ischemic (414 8) (I25 5)   5  History of chronic kidney disease (V13 09) (Z87 448)   6  History of hyperlipidemia (V12 29) (Z86 39)    Surgical History    1  History of Colonoscopy (Fiberoptic) Screening   2  History of Open Treatment Of Tibial Shaft Fracture With Implant   3  History of Pacemaker - Pulse Generator Replacement   4  History of Pacemaker Placement    Family History  Mother    1  Family history of sudden cardiac death (SCD) (V17 41) (Z80 37)    Social History    · Denied: History of Alcohol use   · Always uses seat belt   ·    · Denied: History of Drug use   · Exercises daily (V49 89) (Z78 9)   · History of    · Never a smoker   · Pets/Animals: Dog   · Sleeps 8 - 10 hours a day    Current Meds   1  Aspirin Low Dose 81 MG TABS; TAKE 1 TABLET DAILY; Therapy: (Recorded:22Nov2013) to Recorded   2  Furosemide 40 MG Oral Tablet; take one tablet by mouth twice daily; Therapy: 60BOA5742 to (Evaluate:17Oct2017)  Requested for: 17KVJ6393; Last   Rx:[id number] Ordered   3  Levothyroxine Sodium 25 MCG Oral Tablet; TAKE ONE TABLET BY MOUTH ONCE DAILY; Therapy: 83Tjc7711 to (Evaluate:11Oct2017)  Requested for: 56Buv6366; Last   Rx:[id number]; Status: ACTIVE - Renewal Denied, Transmit to Pharmacy - Awaiting   Verification Ordered   4  Lisinopril 5 MG Oral Tablet; TAKE 1 TABLET DAILY; Therapy: 01RZA9458 to (Evaluate:31Jan2017)  Requested for: 25Bzo1812; Last   Rx:56Qym5781 Ordered   5  Metoprolol Succinate ER 50 MG Oral Tablet Extended Release 24 Hour; TAKE THREE   TABLETS BY MOUTH ONCE DAILY; Therapy: 27HGC7200 to (Evaluate:51Ibi5611)  Requested for: 98Mzu8198; Last   Rx:29Gdb9787 Ordered   6  Omega-3-acid Ethyl Esters 1 GM Oral Capsule; TAKE ONE CAPSULE BY MOUTH TWICE   DAILY; Therapy: 38Uac4563 to ([phone])  Requested for: 98QRT0120; Last   Rx:23Jan2017 Ordered   7  Omeprazole 40 MG Oral Capsule Delayed Release; TAKE ONE CAPSULE BY MOUTH   ONCE DAILY; Therapy: 11EFT3822 to (Halley Ibanez)  Requested for: 90Ash5210; Last   Rx:48Fhf1414 Ordered   8  Pravastatin Sodium 80 MG Oral Tablet; TAKE ONE TABLET BY MOUTH ONCE DAILY; Therapy: 20YWA9901 to (Evaluate:44Dke7927)  Requested for: 28Mar2017; Last   Rx:28Mar2017 Ordered   9  Sertraline HCl - 50 MG Oral Tablet; TAKE ONE TABLET BY MOUTH ONCE DAILY; Therapy: 19PMH6880 to (Cindy Stewart)  Requested for: 05Sep2017; Last   Rx:05Sep2017 Ordered   10  TraZODone HCl - 150 MG Oral Tablet; TAKE ONE TABLET BY MOUTH AT BEDTIME; Therapy: 32FQI6921 to (Evaluate:26Tha3349)  Requested for: 20Jun2017; Last    Rx:20Jun2017 Ordered    Allergies    1  No Known Drug Allergies    2  No Known Latex Allergies    Message   Recorded as Task   Date: 09/01/2017 05:09 PM, Created By: System   Task Name: [facility] MAY   Assigned To: Garrett Gomez   Regarding Patient: Anabelle Disla, Status:  In Progress   Comment:    System - 01 Sep 2017 5:09 PM     Patient discharged from hospital   Patient Name: Varsha Cline  Patient YOB: 1940  Discharge Date: 9/1/2017  Facility: [facility] - 05 Sep 2017 8:43 AM     TASK EDITED   Nick Perez - 06 Sep 2017 4:35 PM     TASK IN PROGRESS     Future Appointments    Date/Time Provider Specialty Site   09/28/2017 02:00 PM Katharine Bloch, DO Cardiology [place]     Signatures   Electronically signed by : Lizette Glover MD; Sep 13 2017 10:54AM EST                       (Author)    Electronically signed by : CHUCHO Biswas ; Sep 13 2017  6:11PM EST                       (Author)

## 2018-01-13 VITALS
HEIGHT: 66 IN | HEART RATE: 70 BPM | WEIGHT: 279.56 LBS | BODY MASS INDEX: 44.93 KG/M2 | DIASTOLIC BLOOD PRESSURE: 76 MMHG | SYSTOLIC BLOOD PRESSURE: 140 MMHG

## 2018-01-13 VITALS
SYSTOLIC BLOOD PRESSURE: 120 MMHG | HEIGHT: 66 IN | BODY MASS INDEX: 44.93 KG/M2 | OXYGEN SATURATION: 93 % | TEMPERATURE: 97.9 F | HEART RATE: 71 BPM | DIASTOLIC BLOOD PRESSURE: 62 MMHG | WEIGHT: 279.56 LBS | RESPIRATION RATE: 22 BRPM

## 2018-01-13 VITALS
WEIGHT: 273 LBS | OXYGEN SATURATION: 97 % | DIASTOLIC BLOOD PRESSURE: 68 MMHG | HEART RATE: 70 BPM | BODY MASS INDEX: 43.87 KG/M2 | SYSTOLIC BLOOD PRESSURE: 126 MMHG | HEIGHT: 66 IN

## 2018-01-13 VITALS
BODY MASS INDEX: 44.52 KG/M2 | HEART RATE: 123 BPM | WEIGHT: 277 LBS | HEIGHT: 66 IN | DIASTOLIC BLOOD PRESSURE: 80 MMHG | RESPIRATION RATE: 22 BRPM | TEMPERATURE: 97.8 F | SYSTOLIC BLOOD PRESSURE: 130 MMHG | OXYGEN SATURATION: 95 %

## 2018-01-13 NOTE — MISCELLANEOUS
Provider Comments  Provider Comments:   CALLED PT FOR NOT SHOW, L/M TO RESCHEDULED          Signatures   Electronically signed by : CHUCHO Beck ; Aug  2 2017 11:36AM EST                       (Author)

## 2018-01-13 NOTE — CONSULTS
I had the pleasure of evaluating your patient, Leonila Bond  My full evaluation follows:      Chief Complaint  New patient, recent diagnosis of esophageal adenocarcinoma  History of Present Illness  78-year-old male referred for the above  Mr Lanny Birch states having progressive dysphagia over the past 2 or 3 months  Patient states that the food would get stuck midway down  No nausea or vomiting  No abdominal pain or constipation  Mr Lanny Birch states that he has had problems with diarrhea recently, taking Pepto-Bismol  Patient believes that he has lost 10 pounds over the past 3 or 4 months  No fevers, chills or sweats  No chest pain or pressure  No headaches, blurred vision or dizziness  No  issues  Review of Systems    Constitutional: as noted in HPI  Eyes: No complaints of eye pain, no red eyes, no discharge from eyes, no itchy eyes  ENT: no complaints of earache, no hearing loss, no nosebleeds, no nasal discharge, no sore throat, no hoarseness  Cardiovascular: No complaints of slow heart rate, no fast heart rate, no chest pain, no palpitations, no leg claudication, no lower extremity  Respiratory: No complaints of shortness of breath, no wheezing, no cough, no SOB on exertion, no orthopnea or PND  Gastrointestinal: as noted in HPI  Genitourinary: No complaints of dysuria, no incontinence, no hesitancy, no nocturia, no genital lesion, no testicular pain  Musculoskeletal: No complaints of arthralgia, no myalgias, no joint swelling or stiffness, no limb pain or swelling  Integumentary: No complaints of skin rash or skin lesions, no itching, no skin wound, no dry skin  Neurological: No compliants of headache, no confusion, no convulsions, no numbness or tingling, no dizziness or fainting, no limb weakness, no difficulty walking  Psychiatric: Is not suicidal, no sleep disturbances, no anxiety or depression, no change in personality, no emotional problems     Endocrine: No complaints of proptosis, no hot flashes, no muscle weakness, no erectile dysfunction, no deepening of the voice, no feelings of weakness  Hematologic/Lymphatic: No complaints of swollen glands, no swollen glands in the neck, does not bleed easily, no easy bruising  Active Problems    1  Abdominal hernia (553 9) (K46 9)   2  Anxiety disorder (300 00) (F41 9)   3  Atrial flutter (427 32) (I48 92)   4  Benign essential hypertension (401 1) (I10)   5  BMI 45 0-49 9, adult (V85 42) (Z68 42)   6  Bursitis of left shoulder (726 10) (M75 52)   7  Cardiomyopathy (425 4) (I42 9)   8  Chronic combined systolic and diastolic congestive heart failure (428 42,428 0) (I50 42)   9  CKD (chronic kidney disease), stage III (585 3) (N18 3)   10  Coronary artery disease (414 00) (I25 10)   11  Cubital tunnel syndrome on left (354 2) (G56 22)   12  Depression with anxiety (300 4) (F41 8)   13  Diabetes mellitus screening (V77 1) (Z13 1)   14  Diastolic heart failure, NYHA class 1 (428 30) (I50 30)   15  Difficulty swallowing (787 20) (R13 10)   16  Elevated TSH (794 5) (R94 6)   17  Esophageal cancer (150 9) (C15 9)   18  Esophageal dysmotility (530 5) (K22 4)   19  Esophageal mass (530 9) (K22 9)   20  Esophageal reflux (530 81) (K21 9)   21  GERD (gastroesophageal reflux disease) (530 81) (K21 9)   22  Gynecomastia, male (611 1) (N62)   21  Hand pain (729 5) (M79 643)   24  Hand tingling (782 0) (R20 2)   25  Hyperkalemia (276 7) (E87 5)   26  Hypertensive Renal Sclerosis (403 90)   27  ICD (implantable cardioverter-defibrillator) in place (V45 02) (Z95 810)   28  Iliotibial band syndrome, left leg (728 89) (M76 32)   29  Insomnia (780 52) (G47 00)   30  Localized, primary osteoarthritis of lower leg, unspecified laterality   31  Mixed hyperlipidemia (272 2) (E78 2)   32  Muscle twitching (781 0) (R25 3)   33  Need for DTaP, hepatitis B, and IPV vaccination (V06 8) (Z23)   34  Need for influenza vaccination (V04 81) (Z23)   35   Need for pneumococcal vaccination (V03 82) (Z23)   36  Need for vaccination with 13-polyvalent pneumococcal conjugate vaccine (V03 82) (Z23)   37  Pacemaker (V45 01) (Z95 0)   38  Peripheral edema (782 3) (R60 9)   39  Prediabetes (790 29) (R73 03)   40  Screening for neurological condition (V80 09) (Z13 89)   41  Subclinical hypothyroidism (244 8) (E03 9)   42  Thyroid disorder screening (V77 0) (Z13 29)    Past Medical History    · History of Acute thigh pain, right (729 5) (M79 651)   · History of Anxiety (300 00) (F41 9)   · History of Cardiomyopathy, ischemic (414 8) (I25 5)   · History of chronic kidney disease (V13 09) (Z87 448)   · History of hyperlipidemia (V12 29) (Z86 39)    The active problems and past medical history were reviewed and updated today  As above, reflux disease, anxiety disorder, chronic kidney disease, hyperlipidemia CAD, irregular heartbeat, obesity, hypertension, hypothyroidism, normal childhood illnesses and vaccinations        Surgical History    · History of Colonoscopy (Fiberoptic) Screening   · History of Open Treatment Of Tibial Shaft Fracture With Implant   · History of Pacemaker - Pulse Generator Replacement   · History of Pacemaker Placement    The surgical history was reviewed and updated today  Patient is a former and caught his left leg in a farm implement approximately 30 years ago requiring surgery        Family History    · Denied: Family history of colon cancer   · Denied: Family history of colonic polyps   · Denied: Family history of liver disease   · Family history of sudden cardiac death (SCD) (V17 41) (Z82 41)    · Denied: Family history of colon cancer   · Denied: Family history of colonic polyps   · Denied: Family history of liver disease    The family history was reviewed and updated today      4 sons in reported good general health although patient does not see his son stool often, no known familial or genetic diseases        Social History    · Denied: History of Alcohol use   · Always uses seat belt   ·    · Denied: History of Drug use   · Exercises daily (V49 89) (Z78 9)   · Sit-n-cycle bike   · Feels safe at home   · History of    · Never a smoker   · Pets/Animals: Dog   · Sleeps 8 - 10 hours a day  The social history was reviewed and updated today  , patient is living with his grandchild and their parents, retired farming and [employer], discontinued tobacco use 25 years ago, no alcohol or drug abuse, 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        Current Meds   1  Amiodarone HCl - 200 MG Oral Tablet; TAKE 1 TABLET TWICE DAILY; Therapy: (Recorded:41Tzn4306) to Recorded   2  Aspirin Low Dose 81 MG TABS; TAKE 1 TABLET DAILY; Therapy: (Gloria Mejia) to Recorded   3  Furosemide 40 MG Oral Tablet; take one tablet by mouth twice daily; Therapy: 91QJP2385 to (Evaluate:02Jan2018)  Requested for: 24Oct2017; Last   Rx:20Psz6329 Ordered   4  Levothyroxine Sodium 25 MCG Oral Tablet; TAKE ONE TABLET BY MOUTH ONCE DAILY; Therapy: 85Oem1512 to (Evaluate:02Jan2018)  Requested for: 61QEL1909; Last   Rx:45Rxk8185 Ordered   5  Lisinopril 5 MG Oral Tablet; TAKE 1 TABLET DAILY; Therapy: 20Hzr3067 to (Evaluate:75Wls6966)  Requested for: 24Oct2017; Last   Rx:18Vpv7700 Ordered   6  Metoprolol Succinate ER 50 MG Oral Tablet Extended Release 24 Hour; TAKE THREE   TABLETS BY MOUTH ONCE DAILY; Therapy: 33WED9661 to (Evaluate:02Apr2018)  Requested for: 24Oct2017; Last   Rx:04Oct2017 Ordered   7  Omega-3-acid Ethyl Esters 1 GM Oral Capsule; TAKE ONE CAPSULE BY MOUTH TWICE   DAILY; Therapy: 68Sot9386 to (Oz Zakia)  Requested for: 95DYO4479; Last   SI:08QCO5436 Ordered   8  Omeprazole 40 MG Oral Capsule Delayed Release; TAKE ONE CAPSULE BY MOUTH   ONCE DAILY; Therapy: 24ZQI3884 to (Evaluate:10Tat0399)  Requested for: 52IGA7291; Last   Rx:04Oct2017 Ordered   9   Pravastatin Sodium 80 MG Oral Tablet; TAKE ONE TABLET BY MOUTH ONCE DAILY; Therapy: 66IOT6530 to ([phone])  Requested for: 24Oct2017; Last   Rx:04Oct2017 Ordered   10  Sertraline HCl - 50 MG Oral Tablet; TAKE ONE TABLET BY MOUTH ONCE DAILY; Therapy: 22DAC4296 to (Evaluate:34Kys8440)  Requested for: 24Oct2017; Last    Rx:35Amn4713 Ordered   11  TraZODone HCl - 150 MG Oral Tablet; TAKE ONE TABLET BY MOUTH AT BEDTIME; Therapy: 82JNV3463 to (Evaluate:02Apr2018)  Requested for: 24Oct2017; Last    Rx:04Oct2017 Ordered   12  Xarelto 20 MG Oral Tablet; Take 1 tablet daily with breakfast  Requested for: 22ZPI5480;    Last Rx:04Oct2017 Ordered    Allergies    1  No Known Drug Allergies    2  No Known Latex Allergies    Vitals   Recorded: 84EYG0253 09:27AM   Temperature 96 2 F   Heart Rate 69   Respiration 16   Systolic 679   Diastolic 80   Height 5 ft 6 in   Weight 265 lb 6 oz   BMI Calculated 42 83   BSA Calculated 2 26   O2 Saturation 97     Physical Exam    Constitutional Obese male, no respiratory distress  Eyes   Conjunctiva and lids: No swelling, erythema, or discharge  Pupils and irises: Equal, round and reactive to light  Ears, Nose, Mouth, and Throat   External inspection of ears and nose: Normal     Nasal mucosa, septum, and turbinates: Normal without edema or erythema  Oropharynx: Normal with no erythema, edema, exudate or lesions  Pulmonary Distant breath sounds bilaterally, scattered rhonchi  Cardiovascular   Palpation of heart: Normal PMI, no thrills  Auscultation of heart: Normal rate and rhythm, normal S1 and S2, without murmurs  1+ bilateral lower extremity edema, no cords, pulses are 1+  Carotid pulses: Normal     Abdomen Obese, nontender, cannot palpate liver or spleen, + bowel sounds  Liver and spleen: Abnormal   + Periumbilical hernia  Lymphatic No adenopathy in the neck, supraclavicular region, axilla and groin bilaterally  Musculoskeletal   Gait and station: Normal     Digits and nails: Normal without clubbing or cyanosis  Inspection/palpation of joints, bones, and muscles: Normal     Skin Warm, moist, good color, no petechiae or ecchymoses  Neurologic   Cranial nerves: Cranial nerves 2-12 intact  Reflexes: 2+ and symmetric  Sensation: No sensory loss  Psychiatric   Orientation to person, place and time: Normal     Mood and affect: Normal     Additional Exam:  Left lower extremity with well-healed scars with trauma to the medial muscles  Results/Data    Results   Pathology 10/31/17 distal esophageal tumor biopsy demonstrated poorly differentiated adenocarcinoma in the background of intestinal metaplasia  Radiology 11/2/17 CAT scan soft tissue of the neck did not demonstrate any mass  Patient had shotty paratracheal adenopathy on the right  Patient had atherosclerotic calcification of the carotid bifurcation bilaterally with moderate left stenosis  CAT scan of the chest and abdomen/pelvis demonstrated a circumferential thickening of the distal esophagus consistent with a history of esophageal mass  Patient had missed I'll adenopathy suspicious for metastatic disease  Patient had a 4 mm left lower lobe pulmonary nodule and high-density material in the gallbladder  Patient has small hiatal hernia, diverticulosis and a ventral abdominal wall hernia containing a loop of bowel  Patient had bilateral inguinal hernias right greater than left  The right-sided hernia containing portion of the bladder  Lab Results 10/5/17 BUN = 22 creatinine = 1 86 calcium = 8 8  Assessment    1  Esophageal cancer (150 9) (C15 9)    Plan  Abdominal hernia    · (1) CBC/PLT/DIFF; Status:Active; Requested for:55Vzf5525;    Perform:[facility]; Due:30Szv7070; Ordered; For:Abdominal hernia; Ordered By:Lamont RUEDA;  Esophageal cancer    · (1) CEA; Status:Active; Requested for:53Dqn1439;    Perform:[facility]; Due:57Xtw3609; Ordered;  For:Esophageal cancer; Ordered By:Lamont RUEDA;   · (1) COMPREHENSIVE METABOLIC PANEL; Status:Active; Requested for:91Aid7944;    Perform:[facility]; Due:89Oxg2377; Ordered;  For:Esophageal cancer; Ordered By:Lamont Roa;   · * NM PET CT SKULL BASE TO MID THIGH; Status:Need Information - Financial  Authorization; Requested for:01Zvs7009;    Perform:[facility]; WHW:62VAZ4424; Ordered;  For:Esophageal cancer; Ordered By:Lamont RUEDA;   · Follow-up visit in 2 weeks Evaluation and Treatment  Follow-up  Status: Hold For -  Scheduling  Requested for: 39CBD2854   Ordered; For: Esophageal cancer; Ordered By: Major Jiménez Performed:  Due: 71DDD2502    Discussion/Summary    50-year-old male with a number of medical problems recently diagnosed with esophageal adenocarcinoma  Mr Ligia Jeronimo feels relatively well and clinically there are no obvious signs of metastatic disease  Issues:    1 Esophageal cancer  More information is needed  CAT scans demonstrated mediastinal adenopathy but these lymph nodes are 1 0-1 2 cm (borderline)  The PET/CT has been requested  Patient will also have CBC, CEA and LFTs drawn  Mr Ligia Jeronimo mentioned stent placement  I will speak to Dr To Rai about this including if EUS is planned  Possibly biopsy of one of the mediastinal lymph nodes will be necessary (to help with staging)  NCCN guidelines divide esophageal cancers into adenocarcinoma and squamous cell carcinoma  Local regional disease (stage I-III) requires a multidisciplinary evaluation (surgery, radiation oncology, medical oncology)  Treatment options depend upon if patient is a surgical candidate and if the patient is medically fit for surgery  At this moment, it is not clear if patient would be a candidate for upfront surgery versus preoperative chemoRT versus definitive chemoRT  After the PET/CT results are available, patient will be referred to radiation oncology +/- surgical oncology  2 Multiple other medical and cardiac issues, elevated creatinine level  Patient will follow-up with [facility] as directed  Treatments may need to be modified because of the decreased renal function  The above was discussed at length with patient and his friend; Mr Brianna Mesa had no questions  Patient is to return in 2 weeks but this will change when the above information is available  Patient knows to call the office if he has any other oncology questions or concerns  Carefully review your medication list and verify that the list is accurate and up-to-date  Please call the hematology/oncology office if there are medications missing from the list, medications on the list that you are not currently taking or if there is a dosage or instruction that is different from how you're taking a medication  Thank you very much for allowing me to participate in the care of this patient  If you have any questions, please do not hesitate to contact me        Signatures   Electronically signed by : Venus Ratliff MD; Nov 15 2017 10:15AM EST                       (Author)

## 2018-01-14 VITALS
HEIGHT: 66 IN | RESPIRATION RATE: 20 BRPM | DIASTOLIC BLOOD PRESSURE: 54 MMHG | TEMPERATURE: 98.2 F | SYSTOLIC BLOOD PRESSURE: 112 MMHG | HEART RATE: 70 BPM | BODY MASS INDEX: 45.32 KG/M2 | WEIGHT: 282 LBS | OXYGEN SATURATION: 93 %

## 2018-01-14 VITALS
RESPIRATION RATE: 16 BRPM | BODY MASS INDEX: 42.65 KG/M2 | SYSTOLIC BLOOD PRESSURE: 140 MMHG | HEIGHT: 66 IN | TEMPERATURE: 96.2 F | DIASTOLIC BLOOD PRESSURE: 80 MMHG | OXYGEN SATURATION: 97 % | HEART RATE: 69 BPM | WEIGHT: 265.38 LBS

## 2018-01-14 VITALS
HEIGHT: 66 IN | HEART RATE: 76 BPM | TEMPERATURE: 99.2 F | BODY MASS INDEX: 45.32 KG/M2 | SYSTOLIC BLOOD PRESSURE: 118 MMHG | RESPIRATION RATE: 22 BRPM | DIASTOLIC BLOOD PRESSURE: 68 MMHG | WEIGHT: 282 LBS | OXYGEN SATURATION: 94 %

## 2018-01-14 NOTE — RESULT NOTES
Verified Results  XR SPINE CERVICAL 1 VIEW 70TVD7040 05:21PM Deni Dennis     Test Name Result Flag Reference   XR SPINE CERVICAL 1 VIEW (Report)     C-ARM - CERVICAL SPINE     INDICATION: Procedure guidance     COMPARISON: None     TECHNIQUE:     FLUOROSCOPY TIME:  126 97     1 FLUOROSCOPIC IMAGES     FINDINGS:     Fluoroscopy is provided for esophageal stent placement per Dr Ailin Patel  Osseous and soft tissue detail limited by technique  IMPRESSION:     Fluoroscopy provided for esophageal stent placement  Please refer to the separate procedure notes for additional details         Workstation performed: [id number]     Signed by:   Ryann Bryson MD   11/21/17

## 2018-01-14 NOTE — PROCEDURES
Procedures by Beatriz Verdin MD at 11/28/2017   1:03 PM      Author:  Beatriz Verdin MD Service:  Cardiology Author Type:  Physician    Filed:  11/28/2017  1:09 PM Date of Service:  11/28/2017  1:03 PM Status:  Signed    :  Beatriz Verdin MD (Physician)         Procedures  Cardiac Catheterization Operative Report    [facility]  [id number]  11/28/2017  Demetrius Delatorre DO    Indication: Cardiomyopathy  Procedure: L Heart Catheterization                     Ventriculogram                     Coronary Angiogram    Procedure Details  The risks, benefits, complications, treatment options, and expected outcomes were discussed with the patient  The patient and/or family concurred with the proposed plan, giving informed consent  Patient was brought to the cath lab after IV hydration was  begun and oral premedication was given  He was further sedated with fentanyl and midazolam  He was prepped and draped in the usual manner  Using the modified Seldinger access technique, a 5 [place] sheath was placed in the  radial artery  Heparin was administered  A left heart catheterization and ventriculogram was completed with a JR4 catheter after the JR4 catheter across the aortic valve  There was difficulty in cannulating the right coronary unable to cannulate with  a JR4, JR3 5, and engagement successfully with a AR1  A tiger catheter was used to cannulate the left coronary artery  After the procedure was completed, sedation was stopped and the sheaths and catheters were all removed  Hemostasis was achieved with manual pressure  Findings:    Hemodynamics   LVEDP 19mmHg, no gradient on pullback   Left Main  short caliber widely patent   RCA   30% proximal disease   LAD   Mild luminal regularities   Circ    50-60%% mid-cx    LV  Normal LV size, Mild diffuse hypokinesis 40-45% grossly       Complications  None     Estimated Blood Loss:  Minimal         Complications:  None; patient tolerated the procedure well  Disposition: hemodynamically stable and PACU - hemodynamically stable           Condition: stable    A/P Mild- moderate diffuse disease including 50-60% cx disease         Mildly elevated filling pressures  -- TR band 2 hours                 Received Jenni BENZ    Nov 28 2017  1:10PM [facility] Standard Time

## 2018-01-15 VITALS
BODY MASS INDEX: 43.71 KG/M2 | HEART RATE: 87 BPM | SYSTOLIC BLOOD PRESSURE: 158 MMHG | TEMPERATURE: 98.4 F | WEIGHT: 272 LBS | HEIGHT: 66 IN | OXYGEN SATURATION: 97 % | DIASTOLIC BLOOD PRESSURE: 82 MMHG | RESPIRATION RATE: 16 BRPM

## 2018-01-15 NOTE — RESULT NOTES
Discussion/Summary   I spoke to patient, he is aware of diagnosis of esophageal cancer  I am awaiting final results for his CT scan  Will refer him to Oncology  I also need to get him scheduled for an esophageal stent  Verified Results  (1) TISSUE EXAM [id number] 08:16AM Natalie Gillis     Test Name Result Flag Reference   LAB AP CASE REPORT (Report)     Surgical Pathology Report             Case: [id number]                   Authorizing Provider: Joey Mcguire MD      Collected:      10/31/2017 0816        Ordering Location:   [facility]  Received:      10/31/2017 [address] [facility]                                     Pathologist:      Sonal Walker MD                                 Specimen:  Esophagus, Bx Distal Esophageal Tumor--esophagus   LAB AP FINAL DIAGNOSIS (Report)     A  Distal esophagus, tumor (biopsy):  - Poorly differentiated adenocarcinoma  - Background intestinal metaplasia     Comment:   - Intradepartmental consultation concurs with the diagnosis of   adenocarcinoma  - Unstained slides from block A1 are available for molecular testing   - Dr Leonidas Lau notified of diagnosis on 11/2/17 at 9:21 am     Interpretation performed at , [address]      Electronically signed by Sonal Walker MD on 11/2/2017 at 10:34 AM   LAB AP SURGICAL ADDITIONAL INFORMATION (Report)     All controls performed with the immunohistochemical stains reported above   reacted appropriately  These tests were developed and their performance   characteristics determined by [facility] or   [facility]  They may not be cleared or approved by the [place]  Food and Drug Administration  The FDA has determined that such clearance   or approval is not necessary  These tests are used for clinical purposes  They should not be regarded as investigational or for research   This   laboratory has been approved by CLIA 88, designated as a high-complexity   laboratory and is qualified to perform these tests  LAB AP GROSS DESCRIPTION (Report)     A  The specimen is received in formalin, labeled with the patient's name   and medical record number, and is designated Biopsy distal esophageal   tumor, esophagus at 33 cm  The specimen consists of multiple tan-red soft   tissue fragments measuring in aggregate 0 5 x 0 5 x 0 1 cm  Entirely   submitted  One cassette  Note: The estimated total formalin fixation time based upon information   provided by the submitting clinician and the standard processing schedule   is less than 72 hours  MAC   LAB AP CLINICAL INFORMATION      Suspected esophageal cancer; please expedite/rush specimen   Suspected esophageal cancer; please expedite/rus specimen   LAB AP ADDENDUM 1      Mucicarmine is focally positive in lumina and rare cell  The original   diagnosis is UNCHANGED  Addendum electronically signed by Chandana Ritter MD on 11/2/2017 at 2:31 PM       Plan  Esophageal cancer    · * CT CHEST ABDOMEN PELVIS W CONTRAST; Status:Need Information - Financial  Authorization;  Requested KVE:[id number];

## 2018-01-15 NOTE — RESULT NOTES
Verified Results  (1) BASIC METABOLIC PROFILE [id number] 01:43PM Rissa Gibbons     Test Name Result Flag Reference   Glucose, Serum 103 mg/dL H 65-99   BUN 15 mg/dL  8-27   Creatinine, Serum 1 26 mg/dL  0 76-1 27   BUN/Creatinine Ratio 12  10-24   Sodium, Serum 135 mmol/L  134-144   Potassium, Serum 4 2 mmol/L  3 5-5 2   Chloride, Serum 94 mmol/L L    Carbon Dioxide, Total 24 mmol/L  18-29   Calcium, Serum 9 3 mg/dL  8 6-10 2   eGFR If NonAfricn Am 55 mL/min/1 73 L >59   eGFR If Africn Am 64 mL/min/1 73  >59

## 2018-01-16 ENCOUNTER — GENERIC CONVERSION - ENCOUNTER (OUTPATIENT)
Dept: OTHER | Facility: OTHER | Age: 78
End: 2018-01-16

## 2018-01-16 NOTE — MISCELLANEOUS
Message   Recorded as Task   Date: 04/15/2016 03:33 PM, Created By: Carroll Hickey   Task Name: Call Back   Assigned To: Vibha Melgar   Regarding Patient: Sandeep Rivas, Status: Active   CommentBella  - 15 Apr 2016 3:33 PM     TASK CREATED  Caller: Self; Other; [phone] (Home)  DR Robbin Josue - PT CALLING TO LET YOU KNOW HIS LEGS ARE STILL SWOLLEN  HE HAD A BUSY DAY TODAY  HE WILL KEEP TAKING THE MEDICATION AND CALL YOU NEXT WEEK  Jeannie Valle - 17 Apr 2016 8:21 PM     TASK REASSIGNED: Previously Assigned To [employer],team   Left message for pt to increase Spironolactone dose from 1/2 tab of 50 mg to 1 tab of 50 mg BID  Asked him to call me in 4 days to let me know how his legs are  Signatures   Electronically signed by :  CHUCHO Krishnamurthy ; Apr 21 2016  5:34PM EST                       (Author)

## 2018-01-16 NOTE — MISCELLANEOUS
Message   Recorded as Task   Date: 11/22/2016 12:34 PM, Created By: Fuentes Cm   Task Name: Med Renewal Request   Assigned To: Rosa Watts   Regarding Patient: Kiran Ferguson, Status: Active   Comment:    Shelley Burnett - 22 Nov 2016 12:34 PM     TASK CREATED  Caller: Self; Renew Medication; [phone] (Home)  Pt has an appt with Dr Scott Dia on Monday, 11/28/16  He is requesting a refill of his Trazadone 50mg tabs to [facility] - 22 Nov 2016 5:19 PM     TASK REASSIGNED: Previously Assigned To Isrrael Lewis - 25 Nov 2016 9:50 AM     TASK REASSIGNED: Previously Assigned To Whitney Parham  This patient is requesting trazodone which is not on his medication list, you are his primary and I don't know if you know about him   Message Free Text Note Form: Left VM that we have never given this medication  Will be eval by Dr Scott Dia Monday        Signatures   Electronically signed by : Lauren Cortez DO; Nov 25 2016 10:08AM EST                       (Author)

## 2018-01-16 NOTE — MISCELLANEOUS
Message  Called pt to discuss family guidance labs  Has borderline hypothyroidism and says he gets fatigued easily  Will start on Synthroid 25 daily and he will rto in 6 weeks to check levels        Plan  Subclinical hypothyroidism    · Levothyroxine Sodium 25 MCG Oral Tablet (Synthroid); TAKE 1 TABLET DAILY    Signatures   Electronically signed by : Ree Rai, ; Sep  2 2016  2:50PM EST                       (Author)

## 2018-01-16 NOTE — MISCELLANEOUS
Message  Per records, pt got started on levothyroxine early Sept 2016 - was supposed to get a recheck of TSH 6 weeks, did not get one - tried calling pt twice, left voicemail for pt to call us back  Will refill med right now for 30 days - but we need a repeat TSH to adjust his dose accordingly        Signatures   Electronically signed by : Tc Hernandez DO; Jan 4 2017  5:01PM EST                       (Author)

## 2018-01-17 NOTE — MISCELLANEOUS
Message  Called pt to discuss refills  PT says insurance issues with nexium and torsemide, although he does have those meds currently  Has enough to cover him to visit with Dr Patricia Benjamin on Mar 14  Told pt to discuss insurance issues with Dr Patricia Benjamin on MAr 14 to see if he needs alternatives to those meds        Plan  Cardiomyopathy    · From  AmLODIPine Besylate 5 MG Oral Tablet  To AmLODIPine Besylate 5  MG Oral Tablet TAKE 1 TABLET DAILY  Coronary artery disease    · From  Pravastatin Sodium 80 MG Oral Tablet  To Pravastatin Sodium 80 MG  Oral Tablet TAKE ONE TABLET BY MOUTH ONCE DAILY  Diastolic heart failure, NYHA class 1    · Metoprolol Succinate ER 50 MG Oral Tablet Extended Release 24 Hour; TAKE 3  TABLET Daily    Signatures   Electronically signed by : Catherine Smith, ; Mar  1 2016  4:31PM EST                       (Author)

## 2018-01-18 NOTE — RESULT NOTES
Verified Results  (1) MICROALBUMIN CREATININE RATIO, RANDOM URINE 22Mar2017 08:31AM Ltanya Innocent     Test Name Result Flag Reference   Creatinine, Urine 145 8 mg/dL  Not Estab  Microalbumin, Urine 29 8 ug/mL  Not Estab     Microalb/Creat Ratio 20 4 mg/g creat  0 0-30 0

## 2018-01-22 VITALS
DIASTOLIC BLOOD PRESSURE: 65 MMHG | HEART RATE: 70 BPM | BODY MASS INDEX: 45.18 KG/M2 | SYSTOLIC BLOOD PRESSURE: 141 MMHG | WEIGHT: 281.13 LBS | HEIGHT: 66 IN

## 2018-01-22 VITALS — TEMPERATURE: 97.4 F | RESPIRATION RATE: 17 BRPM | WEIGHT: 254.98 LBS | HEIGHT: 66 IN | BODY MASS INDEX: 40.98 KG/M2

## 2018-01-23 VITALS
BODY MASS INDEX: 40.66 KG/M2 | DIASTOLIC BLOOD PRESSURE: 62 MMHG | WEIGHT: 253 LBS | SYSTOLIC BLOOD PRESSURE: 108 MMHG | HEIGHT: 66 IN | HEART RATE: 77 BPM | OXYGEN SATURATION: 96 %

## 2018-01-23 VITALS
OXYGEN SATURATION: 92 % | WEIGHT: 256.25 LBS | DIASTOLIC BLOOD PRESSURE: 62 MMHG | RESPIRATION RATE: 16 BRPM | SYSTOLIC BLOOD PRESSURE: 110 MMHG | HEIGHT: 66 IN | TEMPERATURE: 97.5 F | BODY MASS INDEX: 41.18 KG/M2 | HEART RATE: 70 BPM

## 2018-01-23 VITALS
SYSTOLIC BLOOD PRESSURE: 92 MMHG | OXYGEN SATURATION: 94 % | HEIGHT: 66 IN | HEART RATE: 70 BPM | BODY MASS INDEX: 41.14 KG/M2 | TEMPERATURE: 98.7 F | RESPIRATION RATE: 18 BRPM | DIASTOLIC BLOOD PRESSURE: 54 MMHG | WEIGHT: 256 LBS

## 2018-01-23 NOTE — MISCELLANEOUS
Message  I tried to call the patient to find out how he was doing since stent was placed  Please contact patient or family members, I need to review dietary gastric in        Signatures   Electronically signed by : CHUCHO Macias ; Jan 16 2018  9:44AM EST                       (Author)

## 2018-01-23 NOTE — MISCELLANEOUS
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) (K22 9)   4  Abscess of back (682 2) (L02 212)    Plan  Need for influenza vaccination    · Influenza   For: Need for influenza vaccination; Ordered By:Obinna Yoon; Effective Date:12Dec2017; Administered by: Gregg Escobar: 12/12/2017 4:08:00 PM; Last Updated By: Bala Almaraz; 12/12/2017 4:09:17 PM    Discussion/Summary  Discussion Summary:   The patien was here for transition of care after hospitalization for a 3 a flutter and dysphagia secondary to esophageal mass, all hospital records reviewed, current the patient is feeling well has no complaints   atrial flutter: stable patient will follow-up with his cardiologist Dr Omega Crandall    esophageal mass: stent was placed during hospitalization, currently patient is stable, patient will follow-up with his gastroenterologist    skin abscess: the abscess was drained last her stay, there are no signs of infection, patient will follow up in 2 days for his wound check  will give him the flu vaccine today  Medication SE Review and Pt Understands Tx: The treatment plan was reviewed with the patient/guardian  The patient/guardian understands and agrees with the treatment plan   Self Referrals:   Self Referrals: No      Chief Complaint  Chief Complaint Free Text Note Form: SPoke to patient for JULISSA HACKETT communication  Asked patient if we could review his meds and he declined at the time of the call  Follow up appt  is scheduled for 12/12/17 at 2:45 with Dr Michelle Bell RN      History of Present Illness  TCM Communication [facility]: The patient is being contacted for follow-up after hospitalization  Hospital records were reviewed  He was hospitalized at [facility]  The date of admission: 11/25/17, date of discharge: 11/29/17  Diagnosis: Atrial flutter with RVR  He was discharged to home  Medications were not reviewed today  He scheduled a follow up appointment  Follow-up appointments with other specialists: Dr Jason James and Dr Efrem Diaz  The patient is currently experiencing symptoms  "burning in my stomach" Referrals Needed:  none  Communication performed and completed by Guillaume Carrion RN   HPI: 64-year-old male came to the clinic for transitional care, he was admitted to that [facility] November 25th and discharged November 29th for a 3 of for atrial flutter, and esophageal mass, all hospital records reviewed, patient currently is doing well, sometimes he feels some epigastric disturbance but is not bad, patient will follow up with Dr Jason James who is his cardiologist, and with Dr Urvashi Meier who is the gastroenterologist who did the esophageal stent  he was seen last week for skin abscess of the back which was I&D last Thursday, the dressing was changed today before coming here, there was minimal pus on it, no erythema  he denies any pain, denies any fever or chills, denies any nausea or vomiting  has no complaints today  he will need a influenza vaccine today and he agreed to get it      Review of Systems  Complete-Male:   Constitutional: no fever, not feeling poorly, no chills and not feeling tired  Eyes: no eye pain, no eyesight problems, no dryness of the eyes, eyes not red, no purulent discharge from the eyes and no itching of the eyes  ENT: no earache, no nosebleeds, no sore throat, no nasal discharge and no hoarseness  Cardiovascular: the heart rate was not slow, no chest pain, the heart rate was not fast, no palpitations and no extremity edema  Respiratory: shortness of breath during exertion, but no shortness of breath, no cough and no wheezing  Gastrointestinal: abdominal pain, but no nausea, no vomiting, no diarrhea and no blood in stools  Genitourinary: no dysuria  Musculoskeletal: arthralgias, but no joint swelling, no myalgias and no joint stiffness  Integumentary: skin wound, but no rashes, no itching, no dry skin and no skin lesions  Neurological: numbness and tingling, but no headache and no dizziness  Psychiatric: not suicidal, no anxiety, no sleep disturbances and no depression  Hematologic/Lymphatic: no swollen glands, no tendency for easy bleeding, no tendency for easy bruising and no swollen glands in the neck  Active Problems    1  Abdominal hernia (553 9) (K46 9)   2  Anxiety disorder (300 00) (F41 9)   3  Benign essential hypertension (401 1) (I10)   4  BMI 45 0-49 9, adult (V85 42) (Z68 42)   5  Bursitis of left shoulder (726 10) (M75 52)   6  Cardiomyopathy (425 4) (I42 9)   7  Chronic combined systolic and diastolic congestive heart failure (428 42,428 0) (I50 42)   8  CKD (chronic kidney disease), stage III (585 3) (N18 3)   9  Coronary artery disease (414 00) (I25 10)   10  Cubital tunnel syndrome on left (354 2) (G56 22)   11  Depression with anxiety (300 4) (F41 8)   12  Diabetes mellitus screening (V77 1) (Z13 1)   13  Diastolic heart failure, NYHA class 1 (428 30) (I50 30)   14  Difficulty swallowing (787 20) (R13 10)   15  Elevated TSH (794 5) (R94 6)   16  Esophageal dysmotility (530 5) (K22 4)   17  Esophageal reflux (530 81) (K21 9)   18  Gynecomastia, male (611 1) (N62)   23  Hand pain (729 5) (M79 643)   20  Hand tingling (782 0) (R20 2)   21  Hyperkalemia (276 7) (E87 5)   22  Hypertensive Renal Sclerosis (403 90)   23  ICD (implantable cardioverter-defibrillator) in place (V45 02) (Z95 810)   24  Iliotibial band syndrome, left leg (728 89) (M76 32)   25  Insomnia (780 52) (G47 00)   26  Localized, primary osteoarthritis of lower leg, unspecified laterality   27  Mixed hyperlipidemia (272 2) (E78 2)   28  Muscle twitching (781 0) (R25 3)   29  Need for DTaP, hepatitis B, and IPV vaccination (V06 8) (Z23)   30  Need for influenza vaccination (V04 81) (Z23)   31  Need for pneumococcal vaccination (V03 82) (Z23)   32  Need for vaccination with 13-polyvalent pneumococcal conjugate vaccine (V03 82) (Z23)   33  Pacemaker (V45 01) (Z95 0)   34  Peripheral edema (782 3) (R60 9)   35  Prediabetes (790 29) (R73 03)   36  Screening for neurological condition (V80 09) (Z13 89)   37  Subclinical hypothyroidism (244 8) (E03 9)   38  Thyroid disorder screening (V77 0) (Z13 29)    Past Medical History    1  History of Acute thigh pain, right (729 5) (M79 651)   2  History of Anxiety (300 00) (F41 9)   3  History of Cardiomyopathy, ischemic (414 8) (I25 5)   4  History of chronic kidney disease (V13 09) (Z87 448)   5  History of hyperlipidemia (V12 29) (Z86 39)    Surgical History    1  History of Colonoscopy (Fiberoptic) Screening   2  History of Open Treatment Of Tibial Shaft Fracture With Implant   3  History of Pacemaker - Pulse Generator Replacement   4  History of Pacemaker Placement    Family History  Mother    1  Denied: Family history of colon cancer   2  Denied: Family history of colonic polyps   3  Denied: Family history of liver disease   4  Family history of sudden cardiac death (SCD) (V17 41) (Z82 41)  Father    5  Denied: Family history of colon cancer   6  Denied: Family history of colonic polyps   7  Denied: Family history of liver disease    Social History    · Denied: History of Alcohol use   · Always uses seat belt   ·    · Denied: History of Drug use   · Exercises daily (V49 89) (Z78 9)   · Feels safe at home   · History of    · Never a smoker   · Pets/Animals: Dog   · Sleeps 8 - 10 hours a day  Social History Reviewed: The social history was reviewed and updated today  The social history was reviewed and is unchanged  Current Meds   1  Amiodarone HCl - 200 MG Oral Tablet; TAKE 1 TABLET TWICE DAILY; Therapy: (Recorded:36Dhj9085) to Recorded   2  Aspirin 81 MG TABS; TAKE 1 TABLET DAILY; Therapy: (Recorded:26Mag4075) to Recorded   3  Aspirin Low Dose 81 MG TABS; TAKE 1 TABLET DAILY; Therapy: (Isabel Driscoll) to Recorded   4   Carafate 1 GM/10ML Oral Suspension; TAKE 10 ML 4 TIMES DAILY; Therapy: (Recorded:30Zxl4536) to Recorded   5  Daily Multivitamin TABS; TAKE 1 TABLET DAILY; Therapy: (Recorded:80Yke4136) to Recorded   6  Furosemide 40 MG Oral Tablet; take one tablet by mouth twice daily; Therapy: 09HQO8947 to (Evaluate:02Jan2018)  Requested for: 24Oct2017; Last   Rx:33Wac9208 Ordered   7  Lasix 40 MG Oral Tablet; TAKE 1 TABLET DAILY; Therapy: (Recorded:79Ydl9936) to Recorded   8  Levothyroxine Sodium 25 MCG Oral Tablet; TAKE ONE TABLET BY MOUTH ONCE DAILY; Therapy: 90Nws6284 to (Evaluate:02Jan2018)  Requested for: 73FDT4833; Last   Rx:04Oct2017 Ordered   9  Lisinopril 5 MG Oral Tablet; TAKE 1 TABLET DAILY; Therapy: 39Iuo1873 to (Evaluate:03Dec2017)  Requested for: 24Oct2017; Last   Rx:04Oct2017 Ordered   10  Metoprolol Succinate ER 50 MG Oral Tablet Extended Release 24 Hour; TAKE THREE    TABLETS BY MOUTH ONCE DAILY; Therapy: 74XXG2389 to (Evaluate:02Apr2018)  Requested for: 24Oct2017; Last    Rx:04Oct2017 Ordered   11  Omega-3-acid Ethyl Esters 1 GM Oral Capsule; TAKE ONE CAPSULE BY MOUTH TWICE    DAILY; Therapy: 03Vgr4351 to (Zen King)  Requested for: 32ENW9804; Last    Rx:23Jan2017 Ordered   12  Omeprazole 40 MG Oral Capsule Delayed Release; TAKE ONE CAPSULE BY MOUTH    ONCE DAILY; Therapy: 22BGZ9507 to (Evaluate:01Feb2018)  Requested for: 95AQJ3943; Last    Rx:04Oct2017 Ordered   13  Pravastatin Sodium 80 MG Oral Tablet; TAKE ONE TABLET BY MOUTH ONCE DAILY; Therapy: 79USX7417 to (Bright Martinez)  Requested for: 24Oct2017; Last    Rx:04Oct2017 Ordered   14  Sertraline HCl - 50 MG Oral Tablet; TAKE ONE TABLET BY MOUTH ONCE DAILY; Therapy: 13XQO7027 to (Evaluate:01Feb2018)  Requested for: 24Oct2017; Last    Rx:04Oct2017 Ordered   15  TraZODone HCl - 150 MG Oral Tablet; TAKE ONE TABLET BY MOUTH AT BEDTIME; Therapy: 71JBV8976 to (Evaluate:02Apr2018)  Requested for: 24Oct2017; Last    Rx:04Oct2017 Ordered   16  Xarelto 20 MG Oral Tablet;  Take 1 tablet daily with breakfast  Requested for: 15JSG2790;    Last Rx:49Zuh5198 Ordered    Allergies    1  No Known Drug Allergies    2  No Known Latex Allergies    Vitals  Signs   Recorded: 44Tcu3466 03:29PM   Temperature: 98 1 F  Heart Rate: 72  Respiration: 18  Systolic: 187  Diastolic: 70  Height: 5 ft 6 in  Weight: 255 lb   BMI Calculated: 41 16  BSA Calculated: 2 22  O2 Saturation: 92  Recorded: 23FLL8635 10:29AM   Temperature: 97 4 F  Respiration: 17  Height: 5 ft 6 in  Weight: 254 lb 15 71 oz  BMI Calculated: 41 16  BSA Calculated: 2 22    Physical Exam    Constitutional   General appearance: Abnormal   obese patient, well dressed, well nourished, not in acute distress  Eyes   Conjunctiva and lids: No swelling, erythema, or discharge  Pupils and irises: Equal, round and reactive to light  Ears, Nose, Mouth, and Throat   External inspection of ears and nose: Normal     Nasal mucosa, septum, and turbinates: Normal without edema or erythema  Oropharynx: Normal with no erythema, edema, exudate or lesions  Pulmonary   Respiratory effort: No increased work of breathing or signs of respiratory distress  Auscultation of lungs: Clear to auscultation, equal breath sounds bilaterally, no wheezes, no rales, no rhonci  Cardiovascular   Palpation of heart: Normal PMI, no thrills  Auscultation of heart: Normal rate and rhythm, normal S1 and S2, without murmurs  Examination of extremities for edema and/or varicosities: Normal     Carotid pulses: Normal     Abdomen   Abdomen: Abnormal   Morbidly obese, no tenderness, no guarding, bowel sounds positive  Liver and spleen: No hepatomegaly or splenomegaly  Lymphatic   Palpation of lymph nodes in neck: No lymphadenopathy      Musculoskeletal   Gait and station: Normal     Inspection/palpation of joints, bones, and muscles: Normal     Skin   Skin and subcutaneous tissue: Abnormal   old scar on the left shin, secondary to old fracture not healed correctly, the abscess on the back is newly dressed with no drainage  Neurologic   Cranial nerves: Cranial nerves 2-12 intact  Reflexes: 2+ and symmetric  Psychiatric   Orientation to person, place and time: Normal     Mood and affect: Normal          Message   Recorded as Task   Date: 12/01/2017 12:05 PM, Created By: Kesha Tolbert   Task Name: Miscellaneous   Assigned To: Rob Minium   Regarding Patient: Veronica Hernández, Status: In Progress   Reji Murok - 01 Dec 2017 12:05 PM     TASK CREATED  [id number]-ELYSSA Murphy - 01 Dec 2017 12:16 PM     TASK IN PROGRESS     Future Appointments    Date/Time Provider Specialty Site   12/18/2017 02:40 PM Tamika Azevedo DO Cardiology [facility]   12/28/2017 08:00 AM [facility], [address]   02/08/2018 01:30 PM Cardiology [facility], [address] [facility]   12/19/2017 09:00 AM Reyes Escalante MD Hematology [facility]   12/15/2017 03:15 PM CHUCHO Solis   Family Medicine [facility]     Signatures   Electronically signed by : CHUCHO Ramirez ; Dec 13 2017 11:04AM EST                       (Author)    Electronically signed by : CHUCHO Valentine ; Jan 19 2018  4:06PM EST

## 2018-01-23 NOTE — RESULT NOTES
Verified Results  XR CHEST PA ONLY [id number] 06:51PM Deni Dennis     Test Name Result Flag Reference   XR CHEST PA ONLY (Report)     C-ARM - CHEST     INDICATION: Procedure guidance     COMPARISON: None     TECHNIQUE:     FLUOROSCOPY TIME:  97 2 S     1 FLUOROSCOPIC IMAGES     FINDINGS:     Fluoroscopy is provided via the C-arm for esophageal stent placement per Dr Jessy Cade  The stent is seen on both sides of the gastroesophageal junction  Osseous and soft tissue detail limited by technique  IMPRESSION:     Fluoroscopy provided for esophageal stent placement  Please refer to the separate procedure notes for additional details         Workstation performed: [id number]     Signed by:   Cristobal Olmstead MD   1/12/18

## 2018-01-23 NOTE — RESULT NOTES
Verified Results  * FL BARIUM SWALLOW [id number] 11:12AM Kristal Benavides     Test Name Result Flag Reference   FL ESOPHAGRAM COMPLETE (Report)     BARIUM SWALLOW-ESOPHAGRAM     INDICATION: Evaluation of esophageal stent  The patient has a history of distal esophageal carcinoma  COMPARISON: 10/11/2017  IMAGES: 8     FLUOROSCOPY TIME:  42 SEC      TECHNIQUE:   The patient was given thin barium by mouth and images of the esophagus were obtained  FINDINGS:     A  image performed prior to the swallow confirms the esophageal stent to be below the left hemidiaphragm  The patient then swallowed thin liquid barium without difficulty  Mucosal irregularity of the distal esophagus at the gastroesophageal junction again noted consistent with known history of esophageal carcinoma  There is no significant delayed clearance   of the barium from the proximal and mid esophagus due to the mass  The esophageal stent however is located in the stomach below the hemidiaphragm  Gastroesophageal reflux was not observed  There is no hiatal hernia  IMPRESSION:     Migration of esophageal stent into the stomach beyond the distal esophageal carcinoma       These findings were discussed with the patient's caregiver at the time of the study by the radiology technologist        Workstation performed: [id number]     Signed by:   Lesly Klein MD   12/22/17

## 2018-01-23 NOTE — CONSULTS
I had the pleasure of evaluating your patient, Saeed Garces  My full evaluation follows:      Chief Complaint  Chief Complaint:   The patient presents to the office today with Esophageal adenocarcinoma, follow-up  History of Present Illness  52-year-old male recently referred for the above  Mr Alma Chapa states having progressive dysphagia over the past 2 or 3 months  Patient states that the food would get stuck midway down  No nausea or vomiting  No abdominal pain or constipation  Mr Alma Chapa states that he has had problems with diarrhea recently, taking Pepto-Bismol  Patient believes that he has lost 10 pounds over the past 3 or 4 months  Presently patient states feeling okay, about the same as before  No fevers, chills or sweats  No chest pain or pressure  No headaches, blurred vision or dizziness  No  issues  Although the specifics are not presently available, Mr Alma Chapa was recently admitted because the pacemaker was not working properly  Patient also states that he has a borderline his right shoulder that is somewhat painful  Review of Systems    Constitutional: as noted in HPI  Eyes: No complaints of eye pain, no red eyes, no discharge from eyes, no itchy eyes  ENT: no complaints of earache, no hearing loss, no nosebleeds, no nasal discharge, no sore throat, no hoarseness  Cardiovascular: as noted in HPI  Respiratory: No complaints of shortness of breath, no wheezing, no cough, no SOB on exertion, no orthopnea or PND  Gastrointestinal: as noted in HPI  Genitourinary: No complaints of dysuria, no incontinence, no hesitancy, no nocturia, no genital lesion, no testicular pain  Musculoskeletal: No complaints of arthralgia, no myalgias, no joint swelling or stiffness, no limb pain or swelling  Integumentary: No complaints of skin rash or skin lesions, no itching, no skin wound, no dry skin     Neurological: No compliants of headache, no confusion, no convulsions, no numbness or tingling, no dizziness or fainting, no limb weakness, no difficulty walking  Psychiatric: Is not suicidal, no sleep disturbances, no anxiety or depression, no change in personality, no emotional problems  Endocrine: No complaints of proptosis, no hot flashes, no muscle weakness, no erectile dysfunction, no deepening of the voice, no feelings of weakness  Hematologic/Lymphatic: No complaints of swollen glands, no swollen glands in the neck, does not bleed easily, no easy bruising  Active Problems    1  Abdominal hernia (553 9) (K46 9)   2  Anxiety disorder (300 00) (F41 9)   3  Atrial flutter (427 32) (I48 92)   4  Benign essential hypertension (401 1) (I10)   5  BMI 45 0-49 9, adult (V85 42) (Z68 42)   6  Bursitis of left shoulder (726 10) (M75 52)   7  Cardiomyopathy (425 4) (I42 9)   8  Chronic combined systolic and diastolic congestive heart failure (428 42,428 0) (I50 42)   9  CKD (chronic kidney disease), stage III (585 3) (N18 3)   10  Coronary artery disease (414 00) (I25 10)   11  Cubital tunnel syndrome on left (354 2) (G56 22)   12  Depression with anxiety (300 4) (F41 8)   13  Diabetes mellitus screening (V77 1) (Z13 1)   14  Diastolic heart failure, NYHA class 1 (428 30) (I50 30)   15  Difficulty swallowing (787 20) (R13 10)   16  Elevated TSH (794 5) (R94 6)   17  Esophageal cancer (150 9) (C15 9)   18  Esophageal dysmotility (530 5) (K22 4)   19  Esophageal mass (530 9) (K22 9)   20  Esophageal reflux (530 81) (K21 9)   21  GERD (gastroesophageal reflux disease) (530 81) (K21 9)   22  Gynecomastia, male (611 1) (N62)   21  Hand pain (729 5) (M79 643)   24  Hand tingling (782 0) (R20 2)   25  Hyperkalemia (276 7) (E87 5)   26  Hypertensive Renal Sclerosis (403 90)   27  ICD (implantable cardioverter-defibrillator) in place (V45 02) (Z95 810)   28  Iliotibial band syndrome, left leg (728 89) (M76 32)   29  Insomnia (780 52) (G47 00)   30   Localized, primary osteoarthritis of lower leg, unspecified laterality   31  Mixed hyperlipidemia (272 2) (E78 2)   32  Muscle twitching (781 0) (R25 3)   33  Need for DTaP, hepatitis B, and IPV vaccination (V06 8) (Z23)   34  Need for influenza vaccination (V04 81) (Z23)   35  Need for pneumococcal vaccination (V03 82) (Z23)   36  Need for vaccination with 13-polyvalent pneumococcal conjugate vaccine (V03 82) (Z23)   37  Pacemaker (V45 01) (Z95 0)   38  Peripheral edema (782 3) (R60 9)   39  Prediabetes (790 29) (R73 03)   40  Screening for neurological condition (V80 09) (Z13 89)   41  Subclinical hypothyroidism (244 8) (E03 9)   42  Thyroid disorder screening (V77 0) (Z13 29)    Past Medical History    · History of Acute thigh pain, right (729 5) (M79 651)   · History of Anxiety (300 00) (F41 9)   · History of Cardiomyopathy, ischemic (414 8) (I25 5)   · History of chronic kidney disease (V13 09) (Z87 448)   · History of hyperlipidemia (V12 29) (Z86 39)    Surgical History    · History of Colonoscopy (Fiberoptic) Screening   · History of Open Treatment Of Tibial Shaft Fracture With Implant   · History of Pacemaker - Pulse Generator Replacement   · History of Pacemaker Placement    Family History    · Denied: Family history of colon cancer   · Denied: Family history of colonic polyps   · Denied: Family history of liver disease   · Family history of sudden cardiac death (SCD) (V17 41) (Z82 41)    · Denied: Family history of colon cancer   · Denied: Family history of colonic polyps   · Denied: Family history of liver disease    Social History    · Denied: History of Alcohol use   · Always uses seat belt   ·    · Denied: History of Drug use   · Exercises daily (V49 89) (Z78 9)   · Feels safe at home   · History of    · Never a smoker   · Pets/Animals: Dog   · Sleeps 8 - 10 hours a day    Current Meds   1  Amiodarone HCl - 200 MG Oral Tablet; TAKE 1 TABLET TWICE DAILY; Therapy: (Recorded:19Smr8750) to Recorded   2   Aspirin 81 MG TABS; TAKE 1 TABLET DAILY; Therapy: (Recorded:30Cgw4354) to Recorded   3  Aspirin Low Dose 81 MG TABS; TAKE 1 TABLET DAILY; Therapy: (Tiffany Martinez) to Recorded   4  Carafate 1 GM/10ML Oral Suspension; TAKE 10 ML 4 TIMES DAILY; Therapy: (Recorded:24Rch6779) to Recorded   5  Daily Multivitamin TABS; TAKE 1 TABLET DAILY; Therapy: (Recorded:61Vqt8203) to Recorded   6  Lasix 40 MG Oral Tablet; TAKE 1 TABLET DAILY; Therapy: (Recorded:62Tee8022) to Recorded   7  Levothyroxine Sodium 25 MCG Oral Tablet; TAKE ONE TABLET BY MOUTH ONCE DAILY; Therapy: 37Yjo5973 to (Evaluate:02Jan2018)  Requested for: 73XDG8760; Last   Rx:04Oct2017 Ordered   8  Lisinopril 5 MG Oral Tablet; TAKE 1 TABLET DAILY; Therapy: 09Icv7047 to (Evaluate:34Mgq2336)  Requested for: 24Oct2017; Last   Rx:98Jlj8543 Ordered   9  Metoprolol Succinate ER 50 MG Oral Tablet Extended Release 24 Hour; TAKE THREE   TABLETS BY MOUTH ONCE DAILY; Therapy: 72NZE8606 to (Evaluate:02Apr2018)  Requested for: 24Oct2017; Last   Rx:04Oct2017 Ordered   10  Omega-3-acid Ethyl Esters 1 GM Oral Capsule; TAKE ONE CAPSULE BY MOUTH TWICE    DAILY; Therapy: 23Afv3298 to ([id number])  Requested for: 85UNK3243; Last    Rx:23Jan2017 Ordered   11  Omeprazole 40 MG Oral Capsule Delayed Release; TAKE ONE CAPSULE BY MOUTH    ONCE DAILY; Therapy: 44FYW3798 to (Evaluate:01Feb2018)  Requested for: 88QUI1441; Last    Rx:04Oct2017 Ordered   12  Pravastatin Sodium 80 MG Oral Tablet; TAKE ONE TABLET BY MOUTH ONCE DAILY; Therapy: 37CAU3119 to (Evaluate:02Apr2018)  Requested for: 24Oct2017; Last    Rx:04Oct2017 Ordered   13  Sertraline HCl - 50 MG Oral Tablet; TAKE ONE TABLET BY MOUTH ONCE DAILY; Therapy: 30QAV9675 to (Evaluate:68Opt3492)  Requested for: 24Oct2017; Last    Rx:78Vgb2732 Ordered   14  TraZODone HCl - 150 MG Oral Tablet; TAKE ONE TABLET BY MOUTH AT BEDTIME; Therapy: 24ATW0960 to (Evaluate:02Apr2018)  Requested for: 24Oct2017; Last    Rx:04Oct2017 Ordered   15  Xarelto 20 MG Oral Tablet; Take 1 tablet daily with breakfast  Requested for: 98QAF0534;    Last Rx:04Oct2017 Ordered    Allergies    1  No Known Drug Allergies    2  No Known Latex Allergies    Vitals   Recorded: 02VFD8133 11:42AM   Temperature 98 7 F   Heart Rate 70   Respiration 18   Systolic 92   Diastolic 54   Height 5 ft 6 in   Weight 256 lb    BMI Calculated 41 32   BSA Calculated 2 22   O2 Saturation 94     Physical Exam    Constitutional Obese male, no respiratory distress  Eyes   Conjunctiva and lids: No swelling, erythema, or discharge  Pupils and irises: Equal, round and reactive to light  Ears, Nose, Mouth, and Throat   External inspection of ears and nose: Normal     Nasal mucosa, septum, and turbinates: Normal without edema or erythema  Oropharynx: Normal with no erythema, edema, exudate or lesions  Pulmonary Distant breath sounds bilaterally, scattered rhonchi  Cardiovascular   Palpation of heart: Normal PMI, no thrills  Auscultation of heart: Normal rate and rhythm, normal S1 and S2, without murmurs  1+ bilateral lower extremity edema, no cords, pulses are 1+  Carotid pulses: Normal     Abdomen Obese, nontender, cannot palpate liver or spleen, + bowel sounds  Liver and spleen: Abnormal   + Periumbilical hernia  Lymphatic No adenopathy in the neck, supraclavicular region, axilla and groin bilaterally  Musculoskeletal   Gait and station: Normal     Digits and nails: Normal without clubbing or cyanosis  Inspection/palpation of joints, bones, and muscles: Normal     Skin Warm, moist, good color, no petechiae or ecchymoses  Neurologic   Cranial nerves: Cranial nerves 2-12 intact  Reflexes: 2+ and symmetric  Sensation: No sensory loss  Psychiatric   Orientation to person, place and time: Normal     Mood and affect: Normal     Additional Exam:  Left lower extremity with well-healed scars with trauma to the medial muscles          Results/Data    Results   Pathology 10/31/17 distal esophageal tumor biopsy demonstrated poorly differentiated adenocarcinoma in the background of intestinal metaplasia  Radiology 11/2/17 CAT scan soft tissue of the neck did not demonstrate any mass  Patient had shotty paratracheal adenopathy on the right  Patient had atherosclerotic calcification of the carotid bifurcation bilaterally with moderate left stenosis  CAT scan of the chest and abdomen/pelvis demonstrated a circumferential thickening of the distal esophagus consistent with a history of esophageal mass  Patient had missed I'll adenopathy suspicious for metastatic disease  Patient had a 4 mm left lower lobe pulmonary nodule and high-density material in the gallbladder  Patient has small hiatal hernia, diverticulosis and a ventral abdominal wall hernia containing a loop of bowel  Patient had bilateral inguinal hernias right greater than left  The right-sided hernia containing portion of the bladder  Lab Results 11/16/17 WBC = 7 1 hemoglobin = 12 5 hematocrit = 36 9 platelet = 537 urine = 23 creatinine = 1 53 calcium = 9 2 total bilirubin <0 2 alkaline phosphatase = 59 and AST = 19 ALT = 13 CEA = 2 1  10/5/17 BUN = 22 creatinine = 1 86 calcium = 8 8    PET 12/1/17 PET/CT demonstrated intense FDG activity over approximately a 6 cm segment of the distal esophagus, SUV = 15 2  Patient had 2 right paraesophageal nodes with mild hypermetabolism as well as mild right hilar activity  No other evidence of metastases in the neck, abdomen or pelvis  Patient had left basilar infiltrates/atelectasis and small bilateral pleural effusions  Patient had prostatic calcifications with somewhat prominent FDG activity, SUV = 5 3  Assessment    1  Esophageal cancer (150 9) (C15 9)    Plan  Esophageal cancer    · Follow-up visit in 2 weeks Evaluation and Treatment  Follow-up  Status: Complete  Done:  05MTM0439 03:05PM   Ordered;  For: Esophageal cancer; Ordered By: Dena Ahumada Performed:  Due: 92GYX1706; Last Updated By: Dede Humphrey; 12/4/2017 12:13:05 PM   · *1- [facility] INTERVENTIONAL RADIOLOGY Co-Management  *  Status: Active - Retrospective  By Protocol Authorization  Requested for: 82RPR7846 01:30PM   Ordered; For: Esophageal cancer; Ordered By: Jeremy Mayfield Performed:  Due: 03QGR3245; Last Updated By: Dede Humphrey; 12/4/2017 12:27:06 PM  Care Summary provided  : Yes  GERD (gastroesophageal reflux disease)    · Carafate 1 GM/10ML Oral Suspension; TAKE 10 ML 4 TIMES DAILY   Rx By: Josue Leger; Dispense: 11 Days ; #:1 X 420 ML Bottle; Refill: 0; For: GERD (gastroesophageal reflux disease); AVANI = N; Verified Transmission to [facility]; Last Updated By: System, [employer]; 12/2/2017 9:16:25 AM    Discussion/Summary    78-year-old male with a number of medical problems recently diagnosed with esophageal adenocarcinoma  Mr Leanna Winslow feels relatively well and clinically there are no obvious signs of metastatic disease  Issues:    1 Esophageal cancer  PET/CT did not demonstrate any evidence of metastatic disease although there were lymph nodes that had increased FDG activity  Any T N1 = stage IIIA; any T N2 = stage IIIB  NCCN guidelines state that for any T, N+ disease, primary treatment includes preoperative chemoradiation (category 1, paclitaxel and carboplatin weekly during RT)  Patients are then re-evaluated by surgery  Mr Leanna Winslow has a radiation oncology appointment for next week  I will see the patient in 2 weeks but this may change depending upon RT recommendations  Discussions will need to take place regarding feeding tube placement  I do not see a surgical evaluation consult note in the chart  This needs to be clarified  If patient is a surgical candidate, he should be seen by surgical oncology prior to beginning treatment  2 Stent placement  Follow-up with GI is ongoing  3 Multiple other medical and cardiac issues, elevated creatinine level   Patient will follow-up with [facility] as directed  The above was discussed at length with patient and his friend; Mr Brianna Mesa had no questions  Patient is to return in 2 weeks but this will change when the above information is available  Patient knows to call the office if he has any other oncology questions or concerns  Carefully review your medication list and verify that the list is accurate and up-to-date  Please call the hematology/oncology office if there are medications missing from the list, medications on the list that you are not currently taking or if there is a dosage or instruction that is different from how you're taking a medication  Thank you very much for allowing me to participate in the care of this patient  If you have any questions, please do not hesitate to contact me        Signatures   Electronically signed by : Venus Ratliff MD; Dec  4 2017 12:45PM EST                       (Author)

## 2018-01-23 NOTE — MISCELLANEOUS
Message    Patient with esophageal cancer s/p esophageal stent placement 11/17 with dysphagia the last few days had stat barium swallow today to evaluate for esophageal stent migration  I was called by the radiology tech and was told that the esophageal stent had completely migrated into the stomach  I spoke with the patient directly via telephone and instructed him to go to the emergency room to be admitted  The patient will ultimately an upper endoscopy to for retrieval of the stent  I spoke also with the emergency room staff to inform them of his arrival  He is to be NPO for now  We will see him in consult at the hospital       Plan  Difficulty swallowing, Esophageal cancer    · * XR SPINE CERVICAL COMPLETE 4 OR 5 VW NON INJURY; Status:Active;  Requested  for:29Xya6258;     Signatures   Electronically signed by : Renée Sharma, [facility]; Dec 22 2017 12:39PM EST                       (Author)

## 2018-01-24 ENCOUNTER — GENERIC CONVERSION - ENCOUNTER (OUTPATIENT)
Dept: OTHER | Facility: OTHER | Age: 78
End: 2018-01-24

## 2018-01-24 VITALS
DIASTOLIC BLOOD PRESSURE: 70 MMHG | TEMPERATURE: 98.1 F | WEIGHT: 255 LBS | BODY MASS INDEX: 40.98 KG/M2 | HEIGHT: 66 IN | SYSTOLIC BLOOD PRESSURE: 140 MMHG | HEART RATE: 72 BPM | OXYGEN SATURATION: 92 % | RESPIRATION RATE: 18 BRPM

## 2018-01-24 VITALS
HEART RATE: 69 BPM | OXYGEN SATURATION: 94 % | RESPIRATION RATE: 16 BRPM | WEIGHT: 252 LBS | BODY MASS INDEX: 40.5 KG/M2 | HEIGHT: 66 IN | DIASTOLIC BLOOD PRESSURE: 58 MMHG | SYSTOLIC BLOOD PRESSURE: 102 MMHG | TEMPERATURE: 98.3 F

## 2018-01-24 VITALS
HEART RATE: 72 BPM | BODY MASS INDEX: 40.82 KG/M2 | HEIGHT: 66 IN | TEMPERATURE: 98 F | WEIGHT: 254 LBS | RESPIRATION RATE: 18 BRPM | SYSTOLIC BLOOD PRESSURE: 110 MMHG | DIASTOLIC BLOOD PRESSURE: 74 MMHG

## 2018-01-24 VITALS — SYSTOLIC BLOOD PRESSURE: 100 MMHG | DIASTOLIC BLOOD PRESSURE: 70 MMHG

## 2018-01-25 ENCOUNTER — OFFICE VISIT (OUTPATIENT)
Dept: HEMATOLOGY ONCOLOGY | Facility: MEDICAL CENTER | Age: 78
End: 2018-01-25
Payer: MEDICARE

## 2018-01-25 ENCOUNTER — TELEPHONE (OUTPATIENT)
Dept: FAMILY MEDICINE CLINIC | Facility: CLINIC | Age: 78
End: 2018-01-25

## 2018-01-25 VITALS
SYSTOLIC BLOOD PRESSURE: 122 MMHG | HEART RATE: 83 BPM | WEIGHT: 248 LBS | DIASTOLIC BLOOD PRESSURE: 70 MMHG | HEIGHT: 67 IN | TEMPERATURE: 99.6 F | BODY MASS INDEX: 38.92 KG/M2

## 2018-01-25 DIAGNOSIS — C15.9 ESOPHAGEAL ADENOCARCINOMA (HCC): Primary | ICD-10-CM

## 2018-01-25 PROCEDURE — 99214 OFFICE O/P EST MOD 30 MIN: CPT | Performed by: INTERNAL MEDICINE

## 2018-01-25 RX ORDER — PROMETHAZINE HYDROCHLORIDE 6.25 MG/5ML
10 SYRUP ORAL 3 TIMES DAILY
COMMUNITY
Start: 2018-01-11

## 2018-01-25 RX ORDER — ERGOCALCIFEROL (VITAMIN D2) 10 MCG
TABLET ORAL
COMMUNITY
End: 2018-01-25 | Stop reason: CLARIF

## 2018-01-25 RX ORDER — SULFAMETHOXAZOLE AND TRIMETHOPRIM 800; 160 MG/1; MG/1
TABLET ORAL
COMMUNITY
Start: 2017-12-07 | End: 2018-02-05 | Stop reason: ALTCHOICE

## 2018-01-25 NOTE — PROGRESS NOTES
Erica Schuster  1940  [facility]  [address] [place] [id number]    Chief Complaint   Patient presents with    Esophageal Cancer       History of Present Illness:    60-year-old male recently referred for the above  Mr Maira Ortiz states having progressive dysphagia over the past 2 or 3 months  Patient states that the food would get stuck midway down  No nausea or vomiting  No abdominal pain or constipation  Mr Maira Ortiz was seen by GI and a stent was placed  Patient was also seen by radiation oncology and is to begin concurrent treatment  Although the specifics are not presently clear, Mr Maira Ortiz was also seen by a GI surgeon at [facility]  Dr Leonidas Mandujano, radiation oncology stated that the patient is not a surgical candidate for a number of reasons including comorbidities  The plan is for definitive concurrent treatment  Mr Maira Ortiz returns for follow-up  Patient states feeling okay, about the same as before  No swallowing issues, no pain  Appetite is good, patient is able to get down full meals  Patient initially lost approximately 15 lb but this is been stable over the past month  No problems with excessive bruising or bleeding  No other GI or  issues  Activities are limited but no different than before  No shortness of breath or dyspnea on exertion  No other pain control issues  Patient has a cyst on the right shoulder that recently became more inflamed  The cyst has been there for years  Review of Systems   Constitutional: as noted in HPI  Eyes: No complaints of eye pain, no red eyes, no discharge from eyes, no itchy eyes  ENT: no complaints of earache, no hearing loss, no nosebleeds, no nasal discharge, no sore throat, no hoarseness  Cardiovascular: as noted in HPI  Respiratory: No complaints of shortness of breath, no wheezing, no cough, no SOB on exertion, no orthopnea or PND  Gastrointestinal: as noted in HPI  Genitourinary: No complaints of dysuria, no incontinence, no hesitancy, no nocturia, no genital lesion, no testicular pain  Musculoskeletal: No complaints of arthralgia, no myalgias, no joint swelling or stiffness, no limb pain or swelling  Integumentary:  Right posterior shoulder cyst, no bruising or bleeding, no rashes    Neurological: No compliants of headache, no confusion, no convulsions, no numbness or tingling, no dizziness or fainting, no limb weakness, no difficulty walking  Psychiatric: Is not suicidal, no sleep disturbances, no anxiety or depression, no change in personality, no emotional problems  Endocrine: No complaints of proptosis, no hot flashes, no muscle weakness, no erectile dysfunction, no deepening of the voice, no feelings of weakness  Hematologic/Lymphatic: No complaints of swollen glands, no swollen glands in the neck, does not bleed easily, no easy bruising       Patient Active Problem List   Diagnosis    Atrial flutter with rapid ventricular response (HCC)    Dysphagia    Hypothyroidism    Anxiety disorder    Body mass index (BMI) of 45 0-49 9 in adult ([place] [address] )    Cardiomyopathy ([place] [address] )    Chronic kidney disease    Chronic coronary artery disease    Mixed anxiety depressive disorder    Gastroesophageal reflux disease    Multiple-type hyperlipidemia    Presence of cardiac pacemaker    Esophageal cancer ([place] [address] )    Esophageal dysphagia     Past Medical History:   Diagnosis Date    Anxiety     Arthritis     Atrial fibrillation ([place] [address] )     h/o atrial fib/ flutter    Cardiac disease     Disease of thyroid gland     Dysphagia     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    Esophageal cancer ([place] [address] )     GERD (gastroesophageal reflux disease)     Hernia, abdominal     chronic umbilical    Hyperlipidemia     Hypertension      Past Surgical History:   Procedure Laterality Date    CARDIAC PACEMAKER PLACEMENT pacemaker/defibrilator    CARDIAC PACEMAKER PLACEMENT Left     placed 25 years ago    ESOPHAGOGASTRODUODENOSCOPY N/A 10/31/2017    Procedure: ESOPHAGOGASTRODUODENOSCOPY (EGD); Surgeon: Loyda Pa MD;  Location: [facility]; Service: Gastroenterology    ESOPHAGOGASTRODUODENOSCOPY N/A 11/17/2017    Procedure: ESOPHAGOGASTRODUODENOSCOPY (EGD) WITH STENT;  Surgeon: Loyda Pa MD;  Location: [address] [place] [address];  Service: Gastroenterology    ESOPHAGOGASTRODUODENOSCOPY N/A 12/22/2017    Procedure: ESOPHAGOGASTRODUODENOSCOPY (EGD) with stent retrieval;  Surgeon: Nils Moritz, MD;  Location: [address] [place] [address];  Service: Gastroenterology    ESOPHAGOGASTRODUODENOSCOPY N/A 1/11/2018    Procedure: ESOPHAGOGASTRODUODENOSCOPY (EGD) WITH STENT PLACEMENT;  Surgeon: Loyda Pa MD;  Location[facility];  Service: Gastroenterology    FRACTURE SURGERY      LEG SURGERY      TIBIA FRACTURE SURGERY      orif left leg 1986     History reviewed  No pertinent family history  Social History     Social History    Marital status:      Spouse name: N/A    Number of children: N/A    Years of education: N/A     Occupational History    Not on file       Social History Main Topics    Smoking status: Former Smoker     Packs/day: 1 00     Types: Cigarettes     Quit date: 11/16/1997    Smokeless tobacco: Never Used    Alcohol use No    Drug use: No    Sexual activity: Not on file     Other Topics Concern    Not on file     Social History Narrative    No narrative on file       Current Outpatient Prescriptions:     amiodarone 200 mg tablet, Take 1 tablet by mouth 2 (two) times a day, Disp: 60 tablet, Rfl: 0    aspirin (ASPIRIN LOW DOSE) 81 MG tablet, Take by mouth, Disp: , Rfl:     furosemide (LASIX) 40 mg tablet, Take by mouth daily  , Disp: , Rfl:     levothyroxine 25 mcg tablet, Take 25 mcg by mouth daily  , Disp: , Rfl:     lisinopril (ZESTRIL) 5 mg tablet, Take 5 mg by mouth  , Disp: , Rfl:     metoprolol succinate (TOPROL-XL) 50 mg 24 hr tablet, Take 50 mg by mouth daily Takes 3 tabs , Disp: , Rfl:     Multiple Vitamin (MULTIVITAMIN) tablet, Take 1 tablet by mouth daily, Disp: , Rfl:     Omega-3-Acid Eth Est, Dietary, 1 g CAPS, Take by mouth, Disp: , Rfl:     omeprazole (PriLOSEC) 40 MG capsule, Take by mouth daily  , Disp: , Rfl:     pravastatin (PRAVACHOL) 80 mg tablet, Take by mouth daily  , Disp: , Rfl:     rivaroxaban (XARELTO) 20 mg tablet, Take 1 tablet by mouth daily with breakfast (Patient taking differently: Take 20 mg by mouth daily with breakfast Last dose end of oct  ), Disp: 30 tablet, Rfl: 1    sertraline (ZOLOFT) 50 mg tablet, Take 50 mg by mouth daily  , Disp: , Rfl:     sucralfate (CARAFATE) 1 g/10 mL suspension, Take 10 mL by mouth 4 (four) times a day (before meals and at bedtime), Disp: 420 mL, Rfl: 0    traZODone (DESYREL) 150 mg tablet, Take 150 mg by mouth daily at bedtime  , Disp: , Rfl:   No current facility-administered medications for this visit  Facility-Administered Medications Ordered in Other Visits:     fentaNYL (SUBLIMAZE) injection 25 mcg, 25 mcg, Intravenous, PRN, Zohreh Murrieta MD    ondansetron [facility]) injection 4 mg, 4 mg, Intravenous, Once PRN, Zohreh Murrieta MD    [facility]) injection 12 5 mg, 12 5 mg, Intravenous, Once PRN, Zohreh Murrieta MD  No Known Allergies  Vitals:    01/25/18 1203   BP: 122/70   Pulse: 83   Temp: 99 6 °F (37 6 °C)     Physical Exam     Constitutional Obese male, no respiratory distress  Eyes  Conjunctiva and lids: No swelling, erythema, or discharge  Pupils and irises: Equal, round and reactive to light  Ears, Nose, Mouth, and Throat  External inspection of ears and nose: Normal    Nasal mucosa, septum, and turbinates: Normal without edema or erythema  Oropharynx: Normal with no erythema, edema, exudate or lesions  Pulmonary Distant breath sounds bilaterally, scattered rhonchi  Cardiovascular  Palpation of heart: Normal PMI, no thrills  Auscultation of heart: Normal rate and rhythm, normal S1 and S2, without murmurs  -- 1+ bilateral lower extremity edema, no cords, pulses are 1+  Carotid pulses: Normal      Abdomen Obese, nontender, cannot palpate liver or spleen, + bowel sounds, + Periumbilical hernia  Lymphatic No adenopathy in the neck, supraclavicular region, axilla and groin bilaterally  Musculoskeletal  Gait and station: Normal    Digits and nails: Normal without clubbing or cyanosis  Inspection/palpation of joints, bones, and muscles: Normal      Skin Warm, moist, good color, dry, no petechiae or ecchymoses  Neurologic  Cranial nerves: Cranial nerves 2-12 intact  Reflexes: 2+ and symmetric  Sensation: No sensory loss  Psychiatric  Orientation to person, place and time: Normal    Mood and affect: Normal      Additional Exam:  Left lower extremity with well-healed scars with trauma to the medial muscles  Right posterior shoulder with cyst with fluctuance, area is slightly inflamed, cyst is approximately 1 cm in diameter    Performance Status:  ECOG = 1    Labs:    11/16/17 WBC = 7 1 hemoglobin = 12 5 hematocrit = 36 9 platelet = 460 urine = 23 creatinine = 1 53 calcium = 9 2 total bilirubin <0 2 alkaline phosphatase = 59 and AST = 19 ALT = 13 CEA = 2 110/5/17 BUN = 22 creatinine = 1 86 calcium = 8 8  Imaging    11/2/17 CAT scan soft tissue of the neck did not demonstrate any mass  Patient had shotty paratracheal adenopathy on the right  Patient had atherosclerotic calcification of the carotid bifurcation bilaterally with moderate left stenosis  CAT scan of the chest and abdomen/pelvis demonstrated a circumferential thickening of the distal esophagus consistent with a history of esophageal mass  Patient had missed I'll adenopathy suspicious for metastatic disease   Patient had a 4 mm left lower lobe pulmonary nodule and high-density material in the gallbladder  Patient has small hiatal hernia, diverticulosis and a ventral abdominal wall hernia containing a loop of bowel  Patient had bilateral inguinal hernias right greater than left  The right-sided hernia containing portion of the bladder  PET 12/1/17 PET/CT demonstrated intense FDG activity over approximately a 6 cm segment of the distal esophagus, SUV = 15 2  Patient had 2 right paraesophageal nodes with mild hypermetabolism as well as mild right hilar activity  No other evidence of metastases in the neck, abdomen or pelvis  Patient had left basilar infiltrates/atelectasis and small bilateral pleural effusions  Patient had prostatic calcifications with somewhat prominent FDG activity, SUV = 5 3  Pathology    10/31/17 distal esophageal tumor biopsy demonstrated poorly differentiated adenocarcinoma in the background of intestinal metaplasia  Discussion/Summary:    73-year-old male with a number of medical problems recently diagnosed with esophageal adenocarcinoma  Mr Soriano feels relatively well and clinically there are no obvious signs of metastatic disease  Issues:    1 Esophageal cancer  PET/CT did not demonstrate any evidence of metastatic disease although there were lymph nodes that had increased FDG activity  Any T N1 = stage IIIA; any T N2 = stage IIIB  NCCN guidelines state that for any T, N+ disease, primary treatment includes preoperative chemoradiation or definitive chemo radiation or esophagectomy (other options)  For definitive chemo radiation, a preferred but not category 1 regimen includes paclitaxel and carboplatin  After discussing options with the patient and reviewing the comorbidities, this regimen has been decided to be used (I believe that the fluorouracil cis-platinum or fluorouracil oxaliplatin am regimens would be too toxic for this patient)      Regimen  Paclitaxel 50 mg/M2 IV weekly  Carboplatin AUC = 2 IV weekly  Both drugs are given during radiation (usually approximately 5 weeks)  Goal = cure    2 Stent placement  Follow-up with GI is ongoing - a new stent was recently placed  3 Right posterior shoulder cyst   Patient has been referred to surgery  4 Multiple other medical and cardiac issues, elevated creatinine level  Patient will follow-up with [facility] as directed  Mr Shobha Casas is to return to the office in 2 weeks  Carefully review your medication list and verify that the list is accurate and up-to-date  Please call the hematology/oncology office if there are medications missing from the list, medications on the list that you are not currently taking or if there is a dosage or instruction that is different from how you're taking a medication

## 2018-01-25 NOTE — TELEPHONE ENCOUNTER
Spoke to patient and patient's daughter who reported that patient had a cyst on his back previously that was drained here at [facility]  The area has filled up again  Patient will be starting Chemo and Radiation and the Oncologist wants this taken care of before this starts  An appointment was scheduled for Wednesday 1/31/18 at 8:30 am with Dr Chucho Garcia

## 2018-01-31 ENCOUNTER — OFFICE VISIT (OUTPATIENT)
Dept: FAMILY MEDICINE CLINIC | Facility: CLINIC | Age: 78
End: 2018-01-31
Payer: MEDICARE

## 2018-01-31 VITALS
HEART RATE: 72 BPM | HEIGHT: 67 IN | OXYGEN SATURATION: 93 % | WEIGHT: 245.8 LBS | BODY MASS INDEX: 38.58 KG/M2 | RESPIRATION RATE: 16 BRPM | SYSTOLIC BLOOD PRESSURE: 124 MMHG | DIASTOLIC BLOOD PRESSURE: 70 MMHG

## 2018-01-31 DIAGNOSIS — IMO0002 INFECTED CYST OF RIGHT SHOULDER: Primary | ICD-10-CM

## 2018-01-31 PROCEDURE — 99213 OFFICE O/P EST LOW 20 MIN: CPT | Performed by: STUDENT IN AN ORGANIZED HEALTH CARE EDUCATION/TRAINING PROGRAM

## 2018-01-31 RX ORDER — CLINDAMYCIN HYDROCHLORIDE 300 MG/1
300 CAPSULE ORAL 4 TIMES DAILY
Qty: 28 CAPSULE | Refills: 0 | Status: SHIPPED | OUTPATIENT
Start: 2018-01-31 | End: 2018-02-07

## 2018-01-31 NOTE — PATIENT INSTRUCTIONS
Cyst   WHAT YOU NEED TO KNOW:   A cyst is a round, firm lump found almost anywhere on your body  Cysts may grow slowly but are not cancerous  Treatment is not needed if you have no symptoms  Cysts can be opened and drained if they become infected or cause problems  Cysts can grow larger and make it hard for you to do your daily activities  You may also need antibiotics if there is an infection  You may need surgery to remove the cyst completely  DISCHARGE INSTRUCTIONS:   Medicines:   · Antibiotics  may be given to treat or prevent an infection  · Take your medicine as directed  Contact your healthcare provider if you think your medicine is not helping or if you have side effects  Tell him of her if you are allergic to any medicine  Keep a list of the medicines, vitamins, and herbs you take  Include the amounts, and when and why you take them  Bring the list or the pill bottles to follow-up visits  Carry your medicine list with you in case of an emergency  Return to the emergency department if:   · You develop a fever  · The area around your cyst becomes swollen, red, and painful  · Your cyst continues to drain for 2 days after you start antibiotics  Care for your wound as directed: If you have had your cyst drained or removed, care for your wound as directed  Carefully wash the wound with soap and water  Dry the area and put on new, clean bandages as directed  Change your bandages when they get wet or dirty  Follow up with your healthcare provider as directed: Your wound may need to be checked if your cyst was removed in the emergency department  You may need to see a surgeon if your cyst could not be removed  Write down your questions so you remember to ask during your visits  © 2017 [employer]  Information is for End User's use only and may not be sold, redistributed or otherwise used for commercial purposes   All illustrations and images included in CareNotes® are the copyrighted property of [employer]  or [employer]et[employer]  The above information is an  only  It is not intended as medical advice for individual conditions or treatments  Talk to your doctor, nurse or pharmacist before following any medical regimen to see if it is safe and effective for you

## 2018-01-31 NOTE — PROGRESS NOTES
Assessment/Plan:    No problem-specific Assessment & Plan notes found for this encounter  Diagnoses and all orders for this visit:    Infected cyst of right shoulder  -     Ambulatory referral to General Surgery; Future  -     clindamycin (CLEOCIN) 300 MG capsule; Take 1 capsule (300 mg total) by mouth 4 (four) times a day for 7 days        Pt advised to have sac removed by Dr Brooke Dave and I personally called his office and spoke to his staff about getting him an appt quickly  Pt understands well the plan at this time and has an appt for Dr Brooke Dave tomorrow  Pt given a course of clindamycin as well  Subjective:      Patient ID: Annalise Swanson is a 69 y o  male  Pt here today for continued draining from an abscess on the right shoulder after I&D in office last month  Pt has no pain at site, but is due to start chemo soon, and was advised to have this resolved  Pt brought in with a family friend and said the abx last time only helped minimally  A culture was not performed and it has returned in size  Initial cyst was present for one yr before we saw him last visit  This has been now worsening for 2-3 weeks  Pt has swelling, purpura and pus present  Pt was given bactrim for 7days  Pt given referral for gen surgery, but was not able to go yet  Pt needs cyst sac removed  No systemic signs of illness or infection  The following portions of the patient's history were reviewed and updated as appropriate: allergies, current medications, past family history, past medical history, past social history, past surgical history and problem list     Review of Systems   Constitutional: Negative for chills, diaphoresis, fatigue and fever  HENT: Negative for congestion, postnasal drip and sore throat  Respiratory: Negative for cough and shortness of breath  Cardiovascular: Negative for chest pain, palpitations and leg swelling     Gastrointestinal: Negative for abdominal distention, abdominal pain, constipation, diarrhea, nausea and vomiting  Musculoskeletal: Negative for arthralgias and back pain  Skin: Positive for color change and wound  Negative for pallor and rash  Purulent cyst on right shoulder, still draining since last visit  Objective:     Physical Exam   Constitutional: He is oriented to person, place, and time  He appears well-developed and well-nourished  No distress  HENT:   Head: Normocephalic and atraumatic  Eyes: Right eye exhibits no discharge  Left eye exhibits no discharge  No scleral icterus  Neck: Normal range of motion  Neck supple  No thyromegaly present  Cardiovascular: Normal rate, regular rhythm, normal heart sounds and intact distal pulses  Exam reveals no gallop and no friction rub  No murmur heard  Pulmonary/Chest: No stridor  No respiratory distress  He has no wheezes  He has no rales  He exhibits no tenderness  Abdominal: Soft  Bowel sounds are normal  He exhibits no distension and no mass  There is no tenderness  There is no rebound and no guarding  Musculoskeletal: Normal range of motion  He exhibits no edema, tenderness or deformity  Lymphadenopathy:     He has no cervical adenopathy  Neurological: He is alert and oriented to person, place, and time  No cranial nerve deficit  Skin: Skin is warm and dry  Bruising, ecchymosis and lesion noted  No abrasion, no burn, no laceration and no rash noted  He is not diaphoretic  There is erythema  No cyanosis  No pallor  Nails show no clubbing  Pt has 2 inch diameter wound on right posterior shoulder  Area is dark purple in color, with 4-5 open sites of draining pus  Purulent material oozed out with minimal pressure  Non tender to palpation  Pt had minimal bleeding as well  Psychiatric: He has a normal mood and affect  His behavior is normal    Vitals reviewed

## 2018-02-01 ENCOUNTER — OFFICE VISIT (OUTPATIENT)
Dept: SURGERY | Facility: CLINIC | Age: 78
End: 2018-02-01
Payer: COMMERCIAL

## 2018-02-01 VITALS
DIASTOLIC BLOOD PRESSURE: 60 MMHG | HEART RATE: 76 BPM | BODY MASS INDEX: 35.59 KG/M2 | TEMPERATURE: 97.6 F | HEIGHT: 70 IN | WEIGHT: 248.6 LBS | SYSTOLIC BLOOD PRESSURE: 98 MMHG

## 2018-02-01 DIAGNOSIS — Z79.01 ANTICOAGULATED: ICD-10-CM

## 2018-02-01 DIAGNOSIS — IMO0002 INFECTED CYST OF RIGHT SHOULDER: Primary | ICD-10-CM

## 2018-02-01 PROCEDURE — 99204 OFFICE O/P NEW MOD 45 MIN: CPT | Performed by: SPECIALIST

## 2018-02-01 RX ORDER — HEPARIN SODIUM 5000 [USP'U]/ML
5000 INJECTION, SOLUTION INTRAVENOUS; SUBCUTANEOUS
Status: CANCELLED | OUTPATIENT
Start: 2018-02-01

## 2018-02-01 NOTE — PROGRESS NOTES
History and Physical    Buffy Kauffman 69 y o  male MRN: [id number]  Unit/Bed#:  Encounter: [id number]    History of Present Illness     HPI:  Buffy Kauffman is a 69 y o  male who presents with an abscess of the right shoulder  This was drained to [facility] 26 one month ago  It got a little better  It started draining again three days ago and is referred for evaluation  He is on chemotherapy for esophageal cancer and has a stent  He is on Xarelto for pacemaker and cardiomyopathy and atrial flutter  He comes today to see if drainage of the abscess is needed or can have excision of the cyst   It is not painful  Review of Systems   Skin:        Abscess right shoulder   All other systems reviewed and are negative  Historical Information   Past Medical History:   Diagnosis Date    Anxiety     Arthritis     Atrial fibrillation ([facility] [address] [facility]     h/o atrial fib/ flutter    Cardiac disease     Cardiomyopathy, ischemic     last assessed: 12/8/2015    Chronic kidney disease     last assessed: 12/8/2015    Disease of thyroid gland     Dysphagia     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    Esophageal [facility] [address] [facility]     Esophageal cancer [facility] [address] [facility]     GERD (gastroesophageal reflux disease)     Hernia, abdominal     chronic umbilical    Hyperlipidemia     Hypertension      Past Surgical History:   Procedure Laterality Date    CARDIAC PACEMAKER PLACEMENT      pacemaker/defibrilator    CARDIAC PACEMAKER PLACEMENT Left     placed 25 years ago   [address] [place] [address]      10/22/2015-pulse generatory replacement, pacemaker defibrillator; 7/27/2004 with defibrillator    COLONOSCOPY      (fiberoptic) screening    ESOPHAGOGASTRODUODENOSCOPY N/A 10/31/2017    Procedure: ESOPHAGOGASTRODUODENOSCOPY (EGD); Surgeon: Madeline Bah MD;  Location: [facility] GI LAB;   Service: Gastroenterology    ESOPHAGOGASTRODUODENOSCOPY N/A 11/17/2017    Procedure: ESOPHAGOGASTRODUODENOSCOPY (EGD) WITH STENT; Surgeon: Dione Hodge MD;  Location: [facility];  Service: Gastroenterology    ESOPHAGOGASTRODUODENOSCOPY N/A 12/22/2017    Procedure: ESOPHAGOGASTRODUODENOSCOPY (EGD) with stent retrieval;  Surgeon: Wander Armas MD;  Location: [address] [place] [address];  Service: Gastroenterology    ESOPHAGOGASTRODUODENOSCOPY N/A 1/11/2018    Procedure: ESOPHAGOGASTRODUODENOSCOPY (EGD) WITH STENT PLACEMENT;  Surgeon: Dione Hodge MD;  Location: [facility];  Service: Gastroenterology    FRACTURE SURGERY      LEG SURGERY      TIBIA FRACTURE SURGERY      orif left leg 1986     Social History   History   Alcohol Use No     History   Drug Use No     History   Smoking Status    Former Smoker    Packs/day: 1 00    Types: Cigarettes    Quit date: 11/16/1997   Smokeless Tobacco    Never Used     Family History:   Family History   Problem Relation Age of Onset    Cervical cancer Mother     Sudden death Mother      sudden cardiac death (SCD)       Meds/Allergies   all current active meds have been reviewed, current meds: No current facility-administered medications for this visit        Facility-Administered Medications Ordered in Other Visits   Medication Dose Route Frequency    fentaNYL (SUBLIMAZE) injection 25 mcg  25 mcg Intravenous PRN    ondansetron (ZOFRAN) injection 4 mg  4 mg Intravenous Once PRN    promethazine (PHENERGAN) injection 12 5 mg  12 5 mg Intravenous Once PRN    and PTA meds:    (Not in a hospital admission)  No Known Allergies    Objective   First Vitals:   @VSFIRST2(5,8,6,7,9,11,14,10:FIRST)@    Current Vitals:   Blood Pressure: 98/60 (02/01/18 1355)  Pulse: 76 (02/01/18 1355)  Temperature: 97 6 °F (36 4 °C) (02/01/18 1355)  Temp Source: Oral (02/01/18 1355)  Height: 5' 10" (177 8 cm) (02/01/18 1355)  Weight - Scale: 113 kg (248 lb 9 6 oz) (02/01/18 1355)    [unfilled]    Invasive Devices          No matching active lines, drains, or airways          Physical Exam   Constitutional: He is oriented to person, place, and time  He appears well-developed and well-nourished  HENT:   Head: Normocephalic and atraumatic  Eyes: Conjunctivae and EOM are normal  Pupils are equal, round, and reactive to light  Neck: Normal range of motion  Neck supple  Cardiovascular: Normal heart sounds  Pulmonary/Chest: Effort normal and breath sounds normal    Abdominal: Soft  Bowel sounds are normal    Musculoskeletal: Normal range of motion  Neurological: He is alert and oriented to person, place, and time  He has normal reflexes  Skin:   3 x 3 cm abscess of the right shoulder fluctuant with pus expressed   Psychiatric: He has a normal mood and affect  His behavior is normal  Thought content normal        Lab Results: I have personally reviewed pertinent lab results    , CBC: Lab Results   Component Value Date    WBC 6 20 12/22/2017    HGB 11 8 (L) 12/22/2017    HCT 36 3 (L) 12/22/2017    MCV 90 12/22/2017     12/22/2017    MCH 29 1 12/22/2017    MCHC 32 5 12/22/2017    RDW 15 3 (H) 12/22/2017    MPV 7 1 (L) 12/22/2017    NRBC 0 12/22/2017   , CMP: Lab Results   Component Value Date     12/22/2017     11/16/2017     12/22/2017    CL 96 11/16/2017    CO2 26 12/22/2017    CO2 25 11/16/2017    ANIONGAP 11 12/22/2017    ANIONGAP 13 3 12/16/2015    BUN 21 12/22/2017    BUN 23 11/16/2017    CREATININE 1 72 (H) 12/22/2017    CREATININE 1 53 (H) 11/16/2017    GLUCOSE 98 12/22/2017    GLUCOSE 125 (H) 11/16/2017    CALCIUM 8 8 12/22/2017    CALCIUM 9 2 11/16/2017    AST 16 12/22/2017    AST 19 11/16/2017    ALT 25 12/22/2017    ALT 13 11/16/2017    ALKPHOS 55 12/22/2017    ALKPHOS 59 11/16/2017    PROT 8 0 12/22/2017    PROT 7 0 11/16/2017    BILITOT 0 20 12/22/2017    BILITOT <0 2 11/16/2017    EGFR 38 12/22/2017   , Coagulation:   Lab Results   Component Value Date    INR 0 97 11/25/2017    INR 1 00 12/08/2015   , Urinalysis: Lab Results   Component Value Date    COLORU Yellow 08/31/2017    CLARITYU Clear 08/31/2017 CLARITYU YELLOW 12/08/2015    CLARITYU CLEAR 12/08/2015    SPECGRAV 1 020 08/31/2017    SPECGRAV <=1 005 12/08/2015    PHUR 5 5 08/31/2017    PHUR 6 0 12/08/2015    LEUKOCYTESUR Negative 08/31/2017    LEUKOCYTESUR NEGATIVE 12/08/2015    NITRITE Negative 08/31/2017    NITRITE NEGATIVE 12/08/2015    PROTEINUA Negative 08/31/2017    PROTEINUA NEGATIVE 12/08/2015    GLUCOSEU Negative 08/31/2017    GLUCOSEU NEGATIVE 12/08/2015    KETONESU Negative 08/31/2017    KETONESU NEGATIVE 12/08/2015    BILIRUBINUR Negative 08/31/2017    BILIRUBINUR NEGATIVE 12/08/2015    BLOODU Negative 08/31/2017    BLOODU NEGATIVE 12/08/2015   , Amylase: No results found for: AMYLASE, Lipase: Lab Results   Component Value Date    LIPASE 232 12/22/2017     Imaging: Xr Chest 1 View    Result Date: 1/12/2018  Narrative: C-ARM - CHEST INDICATION: Procedure guidance COMPARISON:  None TECHNIQUE: FLUOROSCOPY TIME:   97 2 S 1 FLUOROSCOPIC IMAGES FINDINGS: Fluoroscopy is provided via the C-arm for esophageal stent placement per Dr Amando Baker  The stent is seen on both sides of the gastroesophageal junction  Osseous and soft tissue detail limited by technique  Impression: Fluoroscopy provided for esophageal stent placement  Please refer to the separate procedure notes for additional details  Workstation performed: [id number]     EKG, Pathology, and Other Studies: I have personally reviewed pertinent reports  Assessment/Plan     Assessment:  Nayla Smith comes today for a recurrent abscess of his right shoulder  This is drained one month ago and has recurred  I want to drain this in the office today but he is still on a blood thinner  Plan:  He needs to have an incision and drainage of the abscess  It is quite large  His doctor recommended this to be done in the operating room  We will schedule this    I spoke to his cardiologist Dr Pasquale rutherford who says it is okay for him to be off the Xarelto for three days before the surgery I have explained to the family we cannot take the abscess and cyst out now until the abscess is cleared  Once this is done we will be able to remove the cyst in the future but not at this time  Counseling / Coordination of Care  Total floor / unit time spent today 45 minutes  Greater than 50% of total time was spent with the patient and / or family counseling and / or coordination of care  A description of the counseling / coordination of care: 20

## 2018-02-01 NOTE — H&P
Patient is a chemo patient  Patient seen by Resident Dr Diana Ferreira 01/31/2018  History and Physical    Regina Baumgarten 69 y o  male MRN: [id number]  Unit/Bed#:  Encounter: [id number]    History of Present Illness     HPI:  Regina Baumgarten is a 69 y o  male who presents with an abscess of the right shoulder  This was drained to [facility] 26 one month ago  It got a little better  It started draining again three days ago and is referred for evaluation  He is on chemotherapy for esophageal cancer and has a stent  He is on Xarelto for pacemaker and cardiomyopathy and atrial flutter  He comes today to see if drainage of the abscess is needed or can have excision of the cyst   It is not painful  Review of Systems   Skin:        Abscess right shoulder   All other systems reviewed and are negative  Historical Information   Past Medical History:   Diagnosis Date    Anxiety     Arthritis     Atrial fibrillation ([facility] [address] [facility]     h/o atrial fib/ flutter    Cardiac disease     Cardiomyopathy, ischemic     last assessed: 12/8/2015    Chronic kidney disease     last assessed: 12/8/2015    Disease of thyroid gland     Dysphagia     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    Esophageal cancer ([facility] [address] [facility]     Esophageal cancer ([facility] [address] [facility]     GERD (gastroesophageal reflux disease)     Hernia, abdominal     chronic umbilical    Hyperlipidemia     Hypertension      Past Surgical History:   Procedure Laterality Date    CARDIAC PACEMAKER PLACEMENT      pacemaker/defibrilator    CARDIAC PACEMAKER PLACEMENT Left     placed 25 years ago   [address] [place] [address]      10/22/2015-pulse generatory replacement, pacemaker defibrillator; 7/27/2004 with defibrillator    COLONOSCOPY      (fiberoptic) screening    ESOPHAGOGASTRODUODENOSCOPY N/A 10/31/2017    Procedure: ESOPHAGOGASTRODUODENOSCOPY (EGD); Surgeon: Amandeep Vogt MD;  Location: [facility] GI LAB;   Service: Gastroenterology    ESOPHAGOGASTRODUODENOSCOPY N/A 11/17/2017    Procedure: ESOPHAGOGASTRODUODENOSCOPY (EGD) WITH STENT;  Surgeon: Meng Young MD;  Location: [facility];  Service: Gastroenterology    ESOPHAGOGASTRODUODENOSCOPY N/A 12/22/2017    Procedure: ESOPHAGOGASTRODUODENOSCOPY (EGD) with stent retrieval;  Surgeon: Mynor King MD;  Location: [address] [place] [address];  Service: Gastroenterology    ESOPHAGOGASTRODUODENOSCOPY N/A 1/11/2018    Procedure: ESOPHAGOGASTRODUODENOSCOPY (EGD) WITH STENT PLACEMENT;  Surgeon: Meng Young MD;  Location: [facility];  Service: Gastroenterology    FRACTURE SURGERY      LEG SURGERY      TIBIA FRACTURE SURGERY      orif left leg 1986     Social History   History   Alcohol Use No     History   Drug Use No     History   Smoking Status    Former Smoker    Packs/day: 1 00    Types: Cigarettes    Quit date: 11/16/1997   Smokeless Tobacco    Never Used     Family History:   Family History   Problem Relation Age of Onset    Cervical cancer Mother     Sudden death Mother      sudden cardiac death (SCD)       Meds/Allergies   all current active meds have been reviewed, current meds: No current facility-administered medications for this visit        Facility-Administered Medications Ordered in Other Visits   Medication Dose Route Frequency    fentaNYL (SUBLIMAZE) injection 25 mcg  25 mcg Intravenous PRN    ondansetron (ZOFRAN) injection 4 mg  4 mg Intravenous Once PRN    promethazine (PHENERGAN) injection 12 5 mg  12 5 mg Intravenous Once PRN    and PTA meds:    (Not in a hospital admission)  No Known Allergies    Objective   First Vitals:   @VSFIRST2(5,8,6,7,9,11,14,10:FIRST)@    Current Vitals:   Blood Pressure: 98/60 (02/01/18 1355)  Pulse: 76 (02/01/18 1355)  Temperature: 97 6 °F (36 4 °C) (02/01/18 1355)  Temp Source: Oral (02/01/18 1355)  Height: 5' 10" (177 8 cm) (02/01/18 1355)  Weight - Scale: 113 kg (248 lb 9 6 oz) (02/01/18 1355)    [unfilled]    Invasive Devices          No matching active lines, drains, or airways Physical Exam   Constitutional: He is oriented to person, place, and time  He appears well-developed and well-nourished  HENT:   Head: Normocephalic and atraumatic  Eyes: Conjunctivae and EOM are normal  Pupils are equal, round, and reactive to light  Neck: Normal range of motion  Neck supple  Cardiovascular: Normal heart sounds  Pulmonary/Chest: Effort normal and breath sounds normal    Abdominal: Soft  Bowel sounds are normal    Musculoskeletal: Normal range of motion  Neurological: He is alert and oriented to person, place, and time  He has normal reflexes  Skin:   3 x 3 cm abscess of the right shoulder fluctuant with pus expressed   Psychiatric: He has a normal mood and affect  His behavior is normal  Thought content normal        Lab Results: I have personally reviewed pertinent lab results    , CBC: Lab Results   Component Value Date    WBC 6 20 12/22/2017    HGB 11 8 (L) 12/22/2017    HCT 36 3 (L) 12/22/2017    MCV 90 12/22/2017     12/22/2017    MCH 29 1 12/22/2017    MCHC 32 5 12/22/2017    RDW 15 3 (H) 12/22/2017    MPV 7 1 (L) 12/22/2017    NRBC 0 12/22/2017   , CMP: Lab Results   Component Value Date     12/22/2017     11/16/2017     12/22/2017    CL 96 11/16/2017    CO2 26 12/22/2017    CO2 25 11/16/2017    ANIONGAP 11 12/22/2017    ANIONGAP 13 3 12/16/2015    BUN 21 12/22/2017    BUN 23 11/16/2017    CREATININE 1 72 (H) 12/22/2017    CREATININE 1 53 (H) 11/16/2017    GLUCOSE 98 12/22/2017    GLUCOSE 125 (H) 11/16/2017    CALCIUM 8 8 12/22/2017    CALCIUM 9 2 11/16/2017    AST 16 12/22/2017    AST 19 11/16/2017    ALT 25 12/22/2017    ALT 13 11/16/2017    ALKPHOS 55 12/22/2017    ALKPHOS 59 11/16/2017    PROT 8 0 12/22/2017    PROT 7 0 11/16/2017    BILITOT 0 20 12/22/2017    BILITOT <0 2 11/16/2017    EGFR 38 12/22/2017   , Coagulation:   Lab Results   Component Value Date    INR 0 97 11/25/2017    INR 1 00 12/08/2015   , Urinalysis: Lab Results   Component Value Date    COLORU Yellow 08/31/2017    CLARITYU Clear 08/31/2017    CLARITYU YELLOW 12/08/2015    CLARITYU CLEAR 12/08/2015    SPECGRAV 1 020 08/31/2017    SPECGRAV <=1 005 12/08/2015    PHUR 5 5 08/31/2017    PHUR 6 0 12/08/2015    LEUKOCYTESUR Negative 08/31/2017    LEUKOCYTESUR NEGATIVE 12/08/2015    NITRITE Negative 08/31/2017    NITRITE NEGATIVE 12/08/2015    PROTEINUA Negative 08/31/2017    PROTEINUA NEGATIVE 12/08/2015    GLUCOSEU Negative 08/31/2017    GLUCOSEU NEGATIVE 12/08/2015    KETONESU Negative 08/31/2017    KETONESU NEGATIVE 12/08/2015    BILIRUBINUR Negative 08/31/2017    BILIRUBINUR NEGATIVE 12/08/2015    BLOODU Negative 08/31/2017    BLOODU NEGATIVE 12/08/2015   , Amylase: No results found for: AMYLASE, Lipase: Lab Results   Component Value Date    LIPASE 232 12/22/2017     Imaging: Xr Chest 1 View    Result Date: 1/12/2018  Narrative: C-ARM - CHEST INDICATION: Procedure guidance COMPARISON:  None TECHNIQUE: FLUOROSCOPY TIME:   97 2 S 1 FLUOROSCOPIC IMAGES FINDINGS: Fluoroscopy is provided via the C-arm for esophageal stent placement per Dr Tyler Killer  The stent is seen on both sides of the gastroesophageal junction  Osseous and soft tissue detail limited by technique  Impression: Fluoroscopy provided for esophageal stent placement  Please refer to the separate procedure notes for additional details  Workstation performed: [id number]     EKG, Pathology, and Other Studies: I have personally reviewed pertinent reports  Assessment/Plan     Assessment:  Catrina Rosen comes today for a recurrent abscess of his right shoulder  This is drained one month ago and has recurred  I want to drain this in the office today but he is still on a blood thinner  Plan:  He needs to have an incision and drainage of the abscess  It is quite large  His doctor recommended this to be done in the operating room  We will schedule this    I spoke to his cardiologist Dr Marissa rutherford who says it is okay for him to be off the Xarelto for three days before the surgery I have explained to the family we cannot take the abscess and cyst out now until the abscess is cleared  Once this is done we will be able to remove the cyst in the future but not at this time  Counseling / Coordination of Care  Total floor / unit time spent today 45 minutes  Greater than 50% of total time was spent with the patient and / or family counseling and / or coordination of care  A description of the counseling / coordination of care: 20

## 2018-02-01 NOTE — LETTER
February 1, 2018     Cristian Diaz DO  [id number] [id number]    Patient: Twan Salazar   YOB: 1940   Date of Visit: 2/1/2018       Dear Dr Nya Andino: Thank you for referring Vineet Daily to me for evaluation  Below are my notes for this consultation  If you have questions, please do not hesitate to call me  I look forward to following your patient along with you  Sincerely,        Ellie Ortega MD        CC: No Recipients  Ellie Ortega MD  2/1/2018  2:18 PM  Sign at close encounter  History and Physical    Twan Salazar 69 y o  male MRN: [id number]  Unit/Bed#:  Encounter: [id number]    History of Present Illness     HPI:  Twan Salazar is a 69 y o  male who presents with an abscess of the right shoulder  This was drained to [facility] one month ago  It got a little better  It started draining again three days ago and is referred for evaluation  He is on chemotherapy for esophageal cancer and has a stent  He is on Xarelto for pacemaker and cardiomyopathy and atrial flutter  He comes today to see if drainage of the abscess is needed or can have excision of the cyst   It is not painful  Review of Systems   Skin:        Abscess right shoulder   All other systems reviewed and are negative        Historical Information   Past Medical History:   Diagnosis Date    Anxiety     Arthritis     Atrial fibrillation ([address] )     h/o atrial fib/ flutter    Cardiac disease     Cardiomyopathy, ischemic     last assessed: 12/8/2015    Chronic kidney disease     last assessed: 12/8/2015    Disease of thyroid gland     Dysphagia     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    Esophageal cancer ([address] )     Esophageal cancer ([address] )     GERD (gastroesophageal reflux disease)     Hernia, abdominal     chronic umbilical    Hyperlipidemia     Hypertension      Past Surgical History:   Procedure Laterality Date    CARDIAC PACEMAKER PLACEMENT      pacemaker/defibrilator    CARDIAC PACEMAKER PLACEMENT Left     placed 25 years ago   [address]      10/22/2015-pulse generatory replacement, pacemaker defibrillator; 7/27/2004 with defibrillator    COLONOSCOPY      (fiberoptic) screening    ESOPHAGOGASTRODUODENOSCOPY N/A 10/31/2017    Procedure: ESOPHAGOGASTRODUODENOSCOPY (EGD); Surgeon: Robb Villaseñor MD;  Location: [facility] GI LAB; Service: Gastroenterology    ESOPHAGOGASTRODUODENOSCOPY N/A 11/17/2017    Procedure: ESOPHAGOGASTRODUODENOSCOPY (EGD) WITH STENT;  Surgeon: Robb Villaseñor MD;  Location: [facility];  Service: Gastroenterology    ESOPHAGOGASTRODUODENOSCOPY N/A 12/22/2017    Procedure: ESOPHAGOGASTRODUODENOSCOPY (EGD) with stent retrieval;  Surgeon: Veronica Varma MD;  Location: [address] [place] [address];  Service: Gastroenterology    ESOPHAGOGASTRODUODENOSCOPY N/A 1/11/2018    Procedure: ESOPHAGOGASTRODUODENOSCOPY (EGD) WITH STENT PLACEMENT;  Surgeon: Robb Villaseñor MD;  Location: [facility];  Service: Gastroenterology    FRACTURE SURGERY      LEG SURGERY      TIBIA FRACTURE SURGERY      orif left leg 1986     Social History   History   Alcohol Use No     History   Drug Use No     History   Smoking Status    Former Smoker    Packs/day: 1 00    Types: Cigarettes    Quit date: 11/16/1997   Smokeless Tobacco    Never Used     Family History:   Family History   Problem Relation Age of Onset    Cervical cancer Mother     Sudden death Mother      sudden cardiac death (SCD)       Meds/Allergies   all current active meds have been reviewed, current meds: No current facility-administered medications for this visit        Facility-Administered Medications Ordered in Other Visits   Medication Dose Route Frequency    fentaNYL (SUBLIMAZE) injection 25 mcg  25 mcg Intravenous PRN    ondansetron (ZOFRAN) injection 4 mg  4 mg Intravenous Once PRN    promethazine (PHENERGAN) injection 12 5 mg  12 5 mg Intravenous Once PRN    and PTA meds: (Not in a hospital admission)  No Known Allergies    Objective   First Vitals:   @VSFIRST2(5,8,6,7,9,11,14,10:FIRST)@    Current Vitals:   Blood Pressure: 98/60 (02/01/18 1355)  Pulse: 76 (02/01/18 1355)  Temperature: 97 6 °F (36 4 °C) (02/01/18 1355)  Temp Source: Oral (02/01/18 1355)  Height: 5' 10" (177 8 cm) (02/01/18 1355)  Weight - Scale: 113 kg (248 lb 9 6 oz) (02/01/18 1355)    [unfilled]    Invasive Devices          No matching active lines, drains, or airways          Physical Exam   Constitutional: He is oriented to person, place, and time  He appears well-developed and well-nourished  HENT:   Head: Normocephalic and atraumatic  Eyes: Conjunctivae and EOM are normal  Pupils are equal, round, and reactive to light  Neck: Normal range of motion  Neck supple  Cardiovascular: Normal heart sounds  Pulmonary/Chest: Effort normal and breath sounds normal    Abdominal: Soft  Bowel sounds are normal    Musculoskeletal: Normal range of motion  Neurological: He is alert and oriented to person, place, and time  He has normal reflexes  Skin:   3 x 3 cm abscess of the right shoulder fluctuant with pus expressed   Psychiatric: He has a normal mood and affect  His behavior is normal  Thought content normal        Lab Results: I have personally reviewed pertinent lab results    , CBC: Lab Results   Component Value Date    WBC 6 20 12/22/2017    HGB 11 8 (L) 12/22/2017    HCT 36 3 (L) 12/22/2017    MCV 90 12/22/2017     12/22/2017    MCH 29 1 12/22/2017    MCHC 32 5 12/22/2017    RDW 15 3 (H) 12/22/2017    MPV 7 1 (L) 12/22/2017    NRBC 0 12/22/2017   , CMP: Lab Results   Component Value Date     12/22/2017     11/16/2017     12/22/2017    CL 96 11/16/2017    CO2 26 12/22/2017    CO2 25 11/16/2017    ANIONGAP 11 12/22/2017    ANIONGAP 13 3 12/16/2015    BUN 21 12/22/2017    BUN 23 11/16/2017    CREATININE 1 72 (H) 12/22/2017    CREATININE 1 53 (H) 11/16/2017    GLUCOSE 98 12/22/2017 GLUCOSE 125 (H) 11/16/2017    CALCIUM 8 8 12/22/2017    CALCIUM 9 2 11/16/2017    AST 16 12/22/2017    AST 19 11/16/2017    ALT 25 12/22/2017    ALT 13 11/16/2017    ALKPHOS 55 12/22/2017    ALKPHOS 59 11/16/2017    PROT 8 0 12/22/2017    PROT 7 0 11/16/2017    BILITOT 0 20 12/22/2017    BILITOT <0 2 11/16/2017    EGFR 38 12/22/2017   , Coagulation:   Lab Results   Component Value Date    INR 0 97 11/25/2017    INR 1 00 12/08/2015   , Urinalysis: Lab Results   Component Value Date    COLORU Yellow 08/31/2017    CLARITYU Clear 08/31/2017    CLARITYU YELLOW 12/08/2015    CLARITYU CLEAR 12/08/2015    SPECGRAV 1 020 08/31/2017    SPECGRAV <=1 005 12/08/2015    PHUR 5 5 08/31/2017    PHUR 6 0 12/08/2015    LEUKOCYTESUR Negative 08/31/2017    LEUKOCYTESUR NEGATIVE 12/08/2015    NITRITE Negative 08/31/2017    NITRITE NEGATIVE 12/08/2015    PROTEINUA Negative 08/31/2017    PROTEINUA NEGATIVE 12/08/2015    GLUCOSEU Negative 08/31/2017    GLUCOSEU NEGATIVE 12/08/2015    KETONESU Negative 08/31/2017    KETONESU NEGATIVE 12/08/2015    BILIRUBINUR Negative 08/31/2017    BILIRUBINUR NEGATIVE 12/08/2015    BLOODU Negative 08/31/2017    BLOODU NEGATIVE 12/08/2015   , Amylase: No results found for: AMYLASE, Lipase: Lab Results   Component Value Date    LIPASE 232 12/22/2017     Imaging: Xr Chest 1 View    Result Date: 1/12/2018  Narrative: C-ARM - CHEST INDICATION: Procedure guidance COMPARISON:  None TECHNIQUE: FLUOROSCOPY TIME:   97 2 S 1 FLUOROSCOPIC IMAGES FINDINGS: Fluoroscopy is provided via the C-arm for esophageal stent placement per Dr Matthew Gutierrez  The stent is seen on both sides of the gastroesophageal junction  Osseous and soft tissue detail limited by technique  Impression: Fluoroscopy provided for esophageal stent placement  Please refer to the separate procedure notes for additional details  Workstation performed: [id number]     EKG, Pathology, and Other Studies: I have personally reviewed pertinent reports  Assessment/Plan     Assessment:  J Carlos Scott comes today for a recurrent abscess of his right shoulder  This is drained one month ago and has recurred  I want to drain this in the office today but he is still on a blood thinner  Plan:  He needs to have an incision and drainage of the abscess  It is quite large  His doctor recommended this to be done in the operating room  We will schedule this  I spoke to his cardiologist Dr Krishna rutherford who says it is okay for him to be off the Xarelto for three days before the surgery I have explained to the family we cannot take the abscess and cyst out now until the abscess is cleared  Once this is done we will be able to remove the cyst in the future but not at this time  Counseling / Coordination of Care  Total floor / unit time spent today 45 minutes  Greater than 50% of total time was spent with the patient and / or family counseling and / or coordination of care  A description of the counseling / coordination of care: 20

## 2018-02-02 PROBLEM — IMO0002: Status: ACTIVE | Noted: 2018-02-02

## 2018-02-02 RX ORDER — SODIUM CHLORIDE 9 MG/ML
20 INJECTION, SOLUTION INTRAVENOUS ONCE
Status: DISCONTINUED | OUTPATIENT
Start: 2018-02-05 | End: 2018-02-05 | Stop reason: HOSPADM

## 2018-02-05 ENCOUNTER — HOSPITAL ENCOUNTER (OUTPATIENT)
Dept: INFUSION CENTER | Facility: HOSPITAL | Age: 78
Discharge: HOME | End: 2018-02-05
Payer: COMMERCIAL

## 2018-02-05 ENCOUNTER — HOSPITAL ENCOUNTER (OUTPATIENT)
Dept: NON INVASIVE DIAGNOSTICS | Facility: HOSPITAL | Age: 78
Discharge: HOME | End: 2018-02-05
Attending: INTERNAL MEDICINE
Payer: COMMERCIAL

## 2018-02-05 ENCOUNTER — HOSPITAL ENCOUNTER (INPATIENT)
Facility: HOSPITAL | Age: 78
LOS: 1 days | Discharge: HOME | DRG: 988 | End: 2018-02-07
Attending: EMERGENCY MEDICINE | Admitting: FAMILY MEDICINE
Payer: COMMERCIAL

## 2018-02-05 ENCOUNTER — ANESTHESIA EVENT (OUTPATIENT)
Dept: PERIOP | Facility: HOSPITAL | Age: 78
DRG: 988 | End: 2018-02-05
Payer: COMMERCIAL

## 2018-02-05 VITALS
DIASTOLIC BLOOD PRESSURE: 62 MMHG | HEART RATE: 109 BPM | SYSTOLIC BLOOD PRESSURE: 128 MMHG | RESPIRATION RATE: 16 BRPM | OXYGEN SATURATION: 92 % | TEMPERATURE: 97.9 F

## 2018-02-05 DIAGNOSIS — N17.9 ACUTE RENAL INJURY (HCC): ICD-10-CM

## 2018-02-05 DIAGNOSIS — IMO0002 INFECTED CYST OF RIGHT SHOULDER: ICD-10-CM

## 2018-02-05 DIAGNOSIS — D64.9 ANEMIA: Primary | ICD-10-CM

## 2018-02-05 DIAGNOSIS — C15.9 MALIGNANT NEOPLASM OF ESOPHAGUS, UNSPECIFIED LOCATION (HCC): ICD-10-CM

## 2018-02-05 PROBLEM — N18.4 ACUTE RENAL FAILURE SUPERIMPOSED ON STAGE 4 CHRONIC KIDNEY DISEASE (HCC): Status: ACTIVE | Noted: 2017-08-31

## 2018-02-05 LAB
ABO GROUP BLD: NORMAL
ABO GROUP BLD: NORMAL
ALBUMIN SERPL BCP-MCNC: 2.9 G/DL (ref 3.5–5)
ALP SERPL-CCNC: 54 U/L (ref 46–116)
ALT SERPL W P-5'-P-CCNC: 19 U/L (ref 12–78)
ANION GAP SERPL CALCULATED.3IONS-SCNC: 7 MMOL/L (ref 4–13)
ANION GAP SERPL CALCULATED.3IONS-SCNC: 9 MMOL/L (ref 4–13)
AST SERPL W P-5'-P-CCNC: 21 U/L (ref 5–45)
BASOPHILS # BLD AUTO: 0 THOUSANDS/ΜL (ref 0–0.1)
BASOPHILS NFR BLD AUTO: 0 % (ref 0–1)
BILIRUB SERPL-MCNC: 0.3 MG/DL (ref 0.2–1)
BLD GP AB SCN SERPL QL: NEGATIVE
BUN SERPL-MCNC: 54 MG/DL (ref 5–25)
BUN SERPL-MCNC: 65 MG/DL (ref 5–25)
CALCIUM SERPL-MCNC: 7.5 MG/DL (ref 8.3–10.1)
CALCIUM SERPL-MCNC: 9.4 MG/DL (ref 8.3–10.1)
CHLORIDE SERPL-SCNC: 104 MMOL/L (ref 100–108)
CHLORIDE SERPL-SCNC: 97 MMOL/L (ref 100–108)
CO2 SERPL-SCNC: 28 MMOL/L (ref 21–32)
CO2 SERPL-SCNC: 30 MMOL/L (ref 21–32)
CREAT SERPL-MCNC: 2.52 MG/DL (ref 0.6–1.3)
CREAT SERPL-MCNC: 3.27 MG/DL (ref 0.6–1.3)
EOSINOPHIL # BLD AUTO: 0.2 THOUSAND/ΜL (ref 0–0.61)
EOSINOPHIL NFR BLD AUTO: 2 % (ref 0–6)
ERYTHROCYTE [DISTWIDTH] IN BLOOD BY AUTOMATED COUNT: 14.2 % (ref 11.6–15.1)
ERYTHROCYTE [DISTWIDTH] IN BLOOD BY AUTOMATED COUNT: 15.4 % (ref 11.6–15.1)
GFR SERPL CREATININE-BSD FRML MDRD: 17 ML/MIN/1.73SQ M
GFR SERPL CREATININE-BSD FRML MDRD: 24 ML/MIN/1.73SQ M
GLUCOSE SERPL-MCNC: 110 MG/DL (ref 65–140)
GLUCOSE SERPL-MCNC: 116 MG/DL (ref 65–140)
GLUCOSE SERPL-MCNC: 135 MG/DL (ref 65–140)
HCT VFR BLD AUTO: 21.8 % (ref 42–52)
HCT VFR BLD AUTO: 24.7 % (ref 42–52)
HGB BLD-MCNC: 7.1 G/DL (ref 14–18)
HGB BLD-MCNC: 7.9 G/DL (ref 14–18)
LYMPHOCYTES # BLD AUTO: 1.2 THOUSANDS/ΜL (ref 0.6–4.47)
LYMPHOCYTES NFR BLD AUTO: 14 % (ref 14–44)
MCH RBC QN AUTO: 27.8 PG (ref 27–31)
MCH RBC QN AUTO: 27.9 PG (ref 27–31)
MCHC RBC AUTO-ENTMCNC: 32.1 G/DL (ref 31.4–37.4)
MCHC RBC AUTO-ENTMCNC: 32.7 G/DL (ref 31.4–37.4)
MCV RBC AUTO: 85 FL (ref 82–98)
MCV RBC AUTO: 87 FL (ref 82–98)
MONOCYTES # BLD AUTO: 0.4 THOUSAND/ΜL (ref 0.17–1.22)
MONOCYTES NFR BLD AUTO: 4 % (ref 4–12)
NEUTROPHILS # BLD AUTO: 7.1 THOUSANDS/ΜL (ref 1.85–7.62)
NEUTS SEG NFR BLD AUTO: 79 % (ref 43–75)
NRBC BLD AUTO-RTO: 0 /100 WBCS
PLATELET # BLD AUTO: 324 THOUSANDS/UL (ref 130–400)
PLATELET # BLD AUTO: 325 THOUSANDS/UL (ref 130–400)
PMV BLD AUTO: 7.7 FL (ref 8.9–12.7)
PMV BLD AUTO: 7.9 FL (ref 8.9–12.7)
POTASSIUM SERPL-SCNC: 3.1 MMOL/L (ref 3.5–5.3)
POTASSIUM SERPL-SCNC: 3.5 MMOL/L (ref 3.5–5.3)
PROT SERPL-MCNC: 7.5 G/DL (ref 6.4–8.2)
RBC # BLD AUTO: 2.56 MILLION/UL (ref 4.7–6.1)
RBC # BLD AUTO: 2.84 MILLION/UL (ref 4.7–6.1)
RH BLD: POSITIVE
RH BLD: POSITIVE
SODIUM SERPL-SCNC: 136 MMOL/L (ref 136–145)
SODIUM SERPL-SCNC: 139 MMOL/L (ref 136–145)
SPECIMEN EXPIRATION DATE: NORMAL
WBC # BLD AUTO: 7 THOUSAND/UL (ref 4.8–10.8)
WBC # BLD AUTO: 9 THOUSAND/UL (ref 4.8–10.8)

## 2018-02-05 PROCEDURE — 86901 BLOOD TYPING SEROLOGIC RH(D): CPT | Performed by: STUDENT IN AN ORGANIZED HEALTH CARE EDUCATION/TRAINING PROGRAM

## 2018-02-05 PROCEDURE — 99284 EMERGENCY DEPT VISIT MOD MDM: CPT

## 2018-02-05 PROCEDURE — 87081 CULTURE SCREEN ONLY: CPT | Performed by: FAMILY MEDICINE

## 2018-02-05 PROCEDURE — 30233N1 TRANSFUSION OF NONAUTOLOGOUS RED BLOOD CELLS INTO PERIPHERAL VEIN, PERCUTANEOUS APPROACH: ICD-10-PCS | Performed by: FAMILY MEDICINE

## 2018-02-05 PROCEDURE — 85027 COMPLETE CBC AUTOMATED: CPT | Performed by: FAMILY MEDICINE

## 2018-02-05 PROCEDURE — 86850 RBC ANTIBODY SCREEN: CPT | Performed by: STUDENT IN AN ORGANIZED HEALTH CARE EDUCATION/TRAINING PROGRAM

## 2018-02-05 PROCEDURE — 36569 INSJ PICC 5 YR+ W/O IMAGING: CPT | Performed by: RADIOLOGY

## 2018-02-05 PROCEDURE — 80048 BASIC METABOLIC PNL TOTAL CA: CPT | Performed by: STUDENT IN AN ORGANIZED HEALTH CARE EDUCATION/TRAINING PROGRAM

## 2018-02-05 PROCEDURE — 77001 FLUOROGUIDE FOR VEIN DEVICE: CPT

## 2018-02-05 PROCEDURE — 99222 1ST HOSP IP/OBS MODERATE 55: CPT | Performed by: INTERNAL MEDICINE

## 2018-02-05 PROCEDURE — 82272 OCCULT BLD FECES 1-3 TESTS: CPT

## 2018-02-05 PROCEDURE — 82948 REAGENT STRIP/BLOOD GLUCOSE: CPT

## 2018-02-05 PROCEDURE — 86900 BLOOD TYPING SEROLOGIC ABO: CPT | Performed by: STUDENT IN AN ORGANIZED HEALTH CARE EDUCATION/TRAINING PROGRAM

## 2018-02-05 PROCEDURE — 76937 US GUIDE VASCULAR ACCESS: CPT

## 2018-02-05 PROCEDURE — C1751 CATH, INF, PER/CENT/MIDLINE: HCPCS

## 2018-02-05 PROCEDURE — 99254 IP/OBS CNSLTJ NEW/EST MOD 60: CPT | Performed by: PHYSICIAN ASSISTANT

## 2018-02-05 PROCEDURE — 02HV33Z INSERTION OF INFUSION DEVICE INTO SUPERIOR VENA CAVA, PERCUTANEOUS APPROACH: ICD-10-PCS | Performed by: FAMILY MEDICINE

## 2018-02-05 PROCEDURE — 85025 COMPLETE CBC W/AUTO DIFF WBC: CPT | Performed by: INTERNAL MEDICINE

## 2018-02-05 PROCEDURE — 77001 FLUOROGUIDE FOR VEIN DEVICE: CPT | Performed by: RADIOLOGY

## 2018-02-05 PROCEDURE — 76937 US GUIDE VASCULAR ACCESS: CPT | Performed by: RADIOLOGY

## 2018-02-05 PROCEDURE — P9021 RED BLOOD CELLS UNIT: HCPCS

## 2018-02-05 PROCEDURE — 86920 COMPATIBILITY TEST SPIN: CPT

## 2018-02-05 PROCEDURE — 36569 INSJ PICC 5 YR+ W/O IMAGING: CPT

## 2018-02-05 PROCEDURE — 80053 COMPREHEN METABOLIC PANEL: CPT | Performed by: INTERNAL MEDICINE

## 2018-02-05 RX ORDER — SODIUM CHLORIDE 9 MG/ML
125 INJECTION, SOLUTION INTRAVENOUS CONTINUOUS
Status: DISCONTINUED | OUTPATIENT
Start: 2018-02-05 | End: 2018-02-06 | Stop reason: SDUPTHER

## 2018-02-05 RX ORDER — HEPARIN SODIUM 5000 [USP'U]/ML
5000 INJECTION, SOLUTION INTRAVENOUS; SUBCUTANEOUS
Status: DISCONTINUED | OUTPATIENT
Start: 2018-02-06 | End: 2018-02-06

## 2018-02-05 RX ORDER — LEVOTHYROXINE SODIUM 0.03 MG/1
25 TABLET ORAL DAILY
Status: DISCONTINUED | OUTPATIENT
Start: 2018-02-06 | End: 2018-02-07 | Stop reason: HOSPADM

## 2018-02-05 RX ORDER — POTASSIUM CHLORIDE 20 MEQ/1
40 TABLET, EXTENDED RELEASE ORAL ONCE
Status: COMPLETED | OUTPATIENT
Start: 2018-02-05 | End: 2018-02-05

## 2018-02-05 RX ORDER — SODIUM CHLORIDE 9 MG/ML
125 INJECTION, SOLUTION INTRAVENOUS CONTINUOUS
Status: DISCONTINUED | OUTPATIENT
Start: 2018-02-05 | End: 2018-02-05 | Stop reason: SDUPTHER

## 2018-02-05 RX ORDER — ONDANSETRON 2 MG/ML
4 INJECTION INTRAMUSCULAR; INTRAVENOUS EVERY 6 HOURS PRN
Status: DISCONTINUED | OUTPATIENT
Start: 2018-02-05 | End: 2018-02-05

## 2018-02-05 RX ORDER — PRAVASTATIN SODIUM 80 MG/1
80 TABLET ORAL
Status: DISCONTINUED | OUTPATIENT
Start: 2018-02-05 | End: 2018-02-07 | Stop reason: HOSPADM

## 2018-02-05 RX ORDER — LIDOCAINE HYDROCHLORIDE 10 MG/ML
INJECTION, SOLUTION INFILTRATION; PERINEURAL CODE/TRAUMA/SEDATION MEDICATION
Status: COMPLETED | OUTPATIENT
Start: 2018-02-05 | End: 2018-02-05

## 2018-02-05 RX ORDER — ACETAMINOPHEN 325 MG/1
650 TABLET ORAL EVERY 6 HOURS PRN
Status: DISCONTINUED | OUTPATIENT
Start: 2018-02-05 | End: 2018-02-07 | Stop reason: HOSPADM

## 2018-02-05 RX ORDER — TRAZODONE HYDROCHLORIDE 50 MG/1
150 TABLET ORAL
Status: DISCONTINUED | OUTPATIENT
Start: 2018-02-05 | End: 2018-02-07 | Stop reason: HOSPADM

## 2018-02-05 RX ORDER — AMIODARONE HYDROCHLORIDE 200 MG/1
200 TABLET ORAL 2 TIMES DAILY
Status: DISCONTINUED | OUTPATIENT
Start: 2018-02-05 | End: 2018-02-07 | Stop reason: HOSPADM

## 2018-02-05 RX ORDER — PROMETHAZINE HYDROCHLORIDE 6.25 MG/5ML
12.5 SYRUP ORAL 3 TIMES DAILY
Status: DISCONTINUED | OUTPATIENT
Start: 2018-02-05 | End: 2018-02-07 | Stop reason: HOSPADM

## 2018-02-05 RX ORDER — SUCRALFATE ORAL 1 G/10ML
1000 SUSPENSION ORAL
Status: DISCONTINUED | OUTPATIENT
Start: 2018-02-05 | End: 2018-02-07 | Stop reason: HOSPADM

## 2018-02-05 RX ORDER — HEPARIN SODIUM 5000 [USP'U]/ML
5000 INJECTION, SOLUTION INTRAVENOUS; SUBCUTANEOUS
Status: DISCONTINUED | OUTPATIENT
Start: 2018-02-05 | End: 2018-02-05

## 2018-02-05 RX ORDER — CLINDAMYCIN HYDROCHLORIDE 150 MG/1
300 CAPSULE ORAL 4 TIMES DAILY
Status: DISPENSED | OUTPATIENT
Start: 2018-02-05 | End: 2018-02-07

## 2018-02-05 RX ORDER — METOPROLOL SUCCINATE 50 MG/1
50 TABLET, EXTENDED RELEASE ORAL DAILY
Status: DISCONTINUED | OUTPATIENT
Start: 2018-02-05 | End: 2018-02-07 | Stop reason: HOSPADM

## 2018-02-05 RX ORDER — ASPIRIN 81 MG/1
81 TABLET, CHEWABLE ORAL DAILY
Status: DISCONTINUED | OUTPATIENT
Start: 2018-02-05 | End: 2018-02-07 | Stop reason: HOSPADM

## 2018-02-05 RX ADMIN — LIDOCAINE HYDROCHLORIDE 2 ML: 10 INJECTION, SOLUTION INFILTRATION; PERINEURAL at 09:08

## 2018-02-05 RX ADMIN — BISACODYL 10 MG: 5 TABLET, COATED ORAL at 21:54

## 2018-02-05 RX ADMIN — PROMETHAZINE HYDROCHLORIDE 12.5 MG: 6.25 SOLUTION ORAL at 19:25

## 2018-02-05 RX ADMIN — CLINDAMYCIN HYDROCHLORIDE 300 MG: 150 CAPSULE ORAL at 14:37

## 2018-02-05 RX ADMIN — METOPROLOL SUCCINATE 50 MG: 50 TABLET, FILM COATED, EXTENDED RELEASE ORAL at 14:37

## 2018-02-05 RX ADMIN — SUCRALFATE 1000 MG: 1 SUSPENSION ORAL at 19:24

## 2018-02-05 RX ADMIN — SODIUM CHLORIDE 125 ML/HR: 0.9 INJECTION, SOLUTION INTRAVENOUS at 12:16

## 2018-02-05 RX ADMIN — CLINDAMYCIN HYDROCHLORIDE 300 MG: 150 CAPSULE ORAL at 19:25

## 2018-02-05 RX ADMIN — AMIODARONE HYDROCHLORIDE 200 MG: 200 TABLET ORAL at 19:25

## 2018-02-05 RX ADMIN — PRAVASTATIN SODIUM 80 MG: 80 TABLET ORAL at 19:25

## 2018-02-05 RX ADMIN — ASPIRIN 81 MG 81 MG: 81 TABLET ORAL at 14:37

## 2018-02-05 RX ADMIN — POTASSIUM CHLORIDE 40 MEQ: 1500 TABLET, EXTENDED RELEASE ORAL at 22:31

## 2018-02-05 RX ADMIN — SERTRALINE HYDROCHLORIDE 50 MG: 50 TABLET ORAL at 14:37

## 2018-02-05 RX ADMIN — ACETAMINOPHEN 650 MG: 325 TABLET, FILM COATED ORAL at 23:29

## 2018-02-05 RX ADMIN — TRAZODONE HYDROCHLORIDE 150 MG: 50 TABLET ORAL at 21:53

## 2018-02-05 NOTE — PROGRESS NOTES
DR NICHOLS OFFICE CALLED WITH LAB RESULTS  CHEMOTHERAPY TO BE HELD TODAY AND PATIENT TO BE SENT TO ER TO BE EVALUATED  HEMOGLOBIN 7 9  BUN 65 CREATININE 3 27  DR NICHOLS WILL CALL ER STAFF  REPORT GIVEN TO REMEDIOS HOLLAND FROM ER

## 2018-02-05 NOTE — PLAN OF CARE
DISCHARGE PLANNING     Discharge to home or other facility with appropriate resources Progressing        HEMATOLOGIC - ADULT     Maintains hematologic stability Progressing        INFECTION - ADULT     Absence or prevention of progression during hospitalization Progressing     Absence of fever/infection during neutropenic period Progressing        Knowledge Deficit     Patient/family/caregiver demonstrates understanding of disease process, treatment plan, medications, and discharge instructions Progressing        Nutrition/Hydration-ADULT     Nutrient/Hydration intake appropriate for improving, restoring or maintaining nutritional needs Progressing        Potential for Falls     Patient will remain free of falls Progressing        SAFETY ADULT     Maintain or return to baseline ADL function Progressing     Maintain or return mobility status to optimal level Progressing        SKIN/TISSUE INTEGRITY - ADULT     Skin integrity remains intact Progressing     Incision(s), wounds(s) or drain site(s) healing without S/S of infection Progressing     Oral mucous membranes remain intact Progressing

## 2018-02-05 NOTE — DISCHARGE INSTRUCTIONS
How to Care for Your Peripherally Inserted Central Catheter   AMBULATORY CARE:   A peripherally inserted central catheter (PICC)  is an IV placed into a large blood vessel near your heart  It is usually inserted through a blood vessel in your arm  Your PICC may have multiple ports  Ports are tubes where you can inject medicine  A PICC can stay in place for several weeks or months  You may need a PICC to get nutrition, medicine, or fluids  Blood samples can be removed from your PICC and sent to the lab for tests  How a PICC is inserted:   · You may be given local anesthesia to numb the area  With local anesthesia you may feel pressure or pushing, but you should not feel any pain  Your healthcare provider will place a tight band around your arm  This helps your healthcare provider see your veins  Your healthcare provider will insert a needle into a vein in your arm  He will guide a wire through the needle into a larger vein near your heart  He may use ultrasound or x-ray pictures to place the wire in the correct place  When the wire is in the correct place, he will move a catheter over the wire  · The needle and wire will be removed, and the catheter will be left in your vein  Healthcare providers may secure the catheter to your skin with a securement device or stitches  A bandage will be placed over your catheter  Your arm may be bruised, swollen, or sore after the procedure  This should get better in a few days  Rest as directed after your procedure  Do not lift anything heavier than 5 pounds  Do not bathe, swim, or drive until your healthcare provider says it is okay  Call 911 for any of the following:   · You feel lightheaded, short of breath, and have chest pain  · You cough up blood  · You have trouble breathing  Seek care immediately if:   · Blood soaks through your bandage  · Your arm or leg feels warm, tender, and painful  It may look swollen and red      · You have trouble moving your arm      · Your catheter falls out  Contact your healthcare provider if:   · You have a fever or swelling, redness, pain, or pus where the catheter was inserted  · You cannot flush your catheter, or you feel pain when you flush your catheter  · You see a hole or crack in the tubing of your catheter  · You see fluid leaking from the insertion site  · You run out of supplies to care for your catheter  · You have questions or concerns about your condition or care  How to change your bandage and clean your skin:  Change your bandage every 7 days or as directed  Change the bandage any time it becomes wet, dirty, or moves out of place  Keep your catheter covered with a bandage at all times  · Get a bandage kit and place it on a clean surface  · Wash your hands with soap and water or use an alcohol-based hand rub  · Put on clean gloves and a mask  If someone else is helping you, that person also needs to wear a mask and gloves  · Carefully remove the old bandage and securement device  Remove your gloves and throw them away  · Wash your hands with soap and water or use an alcohol-based hand rub  · Open the bandage kit with the folded side facing up  Carefully unfold the corners of the bandage kit  Do not touch anything inside of the bandage kit  Everything inside of the bandage kit is sterile  ·  each glove by the folded part and put them on  Do not touch the outside of the gloves with your bare hand  Do not let them touch anything that is not sterile  · Open the cleaning pads and lay them on the bandage kit  · Use the cleaning pads to scrub the area where the catheter is inserted into your skin (insertion site)  Scrub the area around the insertion site as directed  Start at the insertion site and clean outward from it in circles  · Clean the tubing that comes out of your skin as directed  · Place the pad that fits around the insertion site as directed  Place the securement device around your catheter as directed  · Apply the bandage as directed  If the bandage is clear, make sure you can see the insertion site  How to care for caps and tubing:   · Clean the injection cap before and after each use  Wash your hands and put on gloves before you clean each injection cap  Hold the catheter above the cap with 1 hand  Scrub the injection cap with an alcohol pad for 15 seconds  · Change the injection caps every 3 to 7 days or as directed  Wash your hands and put on gloves before you change the caps  If there are clamps on your catheter, close the clamps on each port  Twist the caps to remove them from the end of each port  Scrub the end of each port with an alcohol pad for 15 seconds  Place a new cap on the end of each port  Your healthcare provider may tell you to place protective caps over the injection caps  The caps will protect your catheter from infection when it is not being used  · Change and clean the medicine tubing as directed  You may need to attach extra tubing to your catheter when you get medicine  Ask your healthcare provider how often to change medicine tubing  Wash your hands and put on gloves before you touch medicine tubing  Wipe the end of the tubing with an alcohol wipe before you attach it the injection cap  Always place a cap over the end of medicine tubing when you are not using it  How to flush your PICC:  Your healthcare provider will tell you how often to flush your catheter  He will also tell you how much saline or heparin to flush the catheter with  Always flush your catheter before and after you get medicine through it  Do the following to flush your catheter:  · Wash your hands with soap and water or an alcohol-based hand rub  Put on clean gloves  · Scrub the injection cap with an alcohol pad for 15 seconds  · Attach the saline or heparin syringe to the injection cap  Open the clamp if your catheter has one  · Slowly push on the plunger of the syringe to flush your catheter  Do not force the saline or heparin into your catheter  This could damage the catheter or your vein  Call your healthcare provider if you cannot flush your catheter  · Detach the syringe and throw it away  Scrub the injection cap with an alcohol pad for 15 seconds  Prevent a bloodstream infection:   · Wash your hands often  Wash your hands before and after you touch your catheter  Use soap and water or an alcohol-based hand rub  Tell others to wash their hands before and after they visit  This will decrease germs in your home  · Limit contact with your catheter  Only touch your catheter when you need to give yourself medicine or clean it  Do not let others touch your catheter or medicine tubing  · Keep the tubing clamped when not in use  This will prevent air and water from getting into your catheter  · Do not swim or take a bath  These actions can cause germs to get into your catheter  · Cover your catheter with a waterproof cover before you shower  Ask your healthcare provider where to buy a waterproof cover  He may instead tell you to place a plastic bag or wrap over your catheter  Keep your catheter out of the water as much as possible  Change the bandage if it gets wet  · Check your catheter every day for signs of infection  Look for redness, swelling, pus, or fluid  Report any pain at the insertion site or signs of infection to your healthcare provider right away  Self-care:   · Ask your healthcare provider which activities are safe to do  Do not lift anything heavier than 10 pounds with your arm that has the catheter  · Drink plenty of liquids  Liquids will help prevent dehydration and blood clots  Ask your healthcare provider how much liquid to drink each day and which liquids are best for you  · Tell healthcare providers that you have a catheter    Tell them not to do, IVs, blood draws, and blood pressure readings in the arm with your catheter  Do not allow flu shots or vaccinations in the arm with your catheter  Follow up with your healthcare provider as directed:  Write down your questions so you remember to ask them during your visits  © 2017 [employer] Information is for End User's use only and may not be sold, redistributed or otherwise used for commercial purposes  All illustrations and images included in CareNotes® are the copyrighted property of [employer]  or [employer]  The above information is an  only  It is not intended as medical advice for individual conditions or treatments  Talk to your doctor, nurse or pharmacist before following any medical regimen to see if it is safe and effective for you

## 2018-02-05 NOTE — H&P
H&P Exam - Beata Schumacher 69 y o  male MRN: [id number]    Unit/Bed#: ED 04 Encounter: [id number]    Assessment/Plan:    Jori He is a 68 yo M with PMH of esophageal cancer s/p esophageal stent placement, fib/flutter, chronic systolic and diastolic CHF, ischemic cardiomyopathy with pacemaker, hypertension, hyperlipidemia, hypothyroidism who presents today for work up of abnormal blood work  Pt is observation status under the service of Dr Beto Zhu for an estimated LOS < 2 days  New Onset Normocytic Anemia: Hgb 7 9 today from baseline of ~ 11 2  Unsure as to cause  FOBT done in the ED negative  Pt denies bleeding per rectum or hematemesis  Will monitor daily CBC  He is currently asymptomatic at this time so will not transfuse  Will repeat level 6 hours after initial level and if it continues to be low, or if pt becomes symptomatic, will give PRBC transfusion  Consult GI for further work up  H&H q6h  LIVAN on CKD stage IV: Possibly 2/2 dehydration  Pt's Cr level increased significantly to 3 27 from baseline of ~ 1 5  GFR today is 17  Will monitor daily BMP and start him on IVF NS @ 125 cc/hr  Esophageal Cancer: Pt had esophageal stent placed by Dr Ned Humphries (GI) on 1/11/18  He had seen Dr Jason Cho ([facility]c) on 1/25/18 and it was decided that he would begin chemotherapy with Paclitaxel and Carboplatin today, which he was unable to do due to abnormal lab work as above  Will consult Dr Jason Cho  Recurrent Abscess/Cyst of Right Shoulder: Pt currently on Clindamycin (Day 5/7) 300mg 4x daily  Was drained previously one month ago, but recurred  Pt was referred to surgeon Dr Reymundo Gagnon who wants pt to have an I&D of the abscess in the OR due to large size with local anesthesia  It was scheduled for tomorrow  Pt had to be off of Xarelto for three days in order for this to be done  Spoke with Dr Reymundo Gagnon who is ok with doing the procedure tomorrow while patient  Consult placed to Dr Reymundo Gagnon  Hx of A  Fib/A   Flutter: stable, c/w Metoprolol and Amiodarone  Home Xarelto held for scheduled I&D of right shoulder abscess  Hx of Combined Systolic and Diastolic CHF: Last echo done on 8/31/17 showed EF of 45-50%  Pt does not appear to be overtly fluid over loaded at this time  Will hold home Lasix     Hx of Ischemic Cardiomyopathy with Pacemaker: stable  Pt follows outpatient with cardiologist Dr Naeem Robb  Continue ASA  Hypertension: stable, continue Metoprolol  Will hold Lisinopril due to LIVAN  Hyperlipidemia: stable, continue Pravastatin  Hypothyroidism: Last TSH done 11/25/17 was 7 178  Continue Levothyroxine  GERD: stable, c/w Carafate  Depression: C/w Sertraline and Trazodone  Global: Xarelto (currently held for scheduled I&D of shoulder abscess), monitor and replete electrolytes as needed, IVF, Cardiac diet  History of Present Illness   Dominga Gillis is a 70 yo M with PMH of esophageal cancer s/p esophageal stent placement, fib/flutter, chronic systolic and diastolic CHF, ischemic cardiomyopathy with pacemaker, hypertension, hyperlipidemia, hypothyroidism who presents today for work up of abnormal blood work  Pt has history of recently diagnosed esophageal cancer and follows Dr Brandie Sparks outpatient  Per Dr Freya Cisse note on 1/25/18, pt was going to begin chemotherapy with Paclitaxel and Carboplatin given during radiation  Today pt presented to the infusion center for placement of PICC line, blood work and initiation of chemo  Although after PICC line was placed and blood work was drawn, pt was noted to have Hgb of 7 9 which has decreased from last Hgb level done on 12/22/17 which was 11 8  Pt's baseline Hgb is ~11 2  A CMP also revealed that pt's Cr had gone up to 3 27 today when his last Cr on 12/22/17 was 1 72  Pt's baseline Cr is ~1 5  Pt is currently with his son's ex gf who cares for him occasionally who states that pt has had a decreased appetite lately and not been eating or drinking as much   He denies any other symptoms including shortness of breath, chest pain, bleeding per rectum, nausea/vomiting, fevers, chills, headaches, dizziness, blurry vision, fatigue  Review of Systems   Constitutional: Positive for appetite change  Negative for activity change, chills, diaphoresis, fatigue and fever  HENT: Negative  Eyes: Negative  Respiratory: Negative for cough, choking, chest tightness, shortness of breath, wheezing and stridor  Cardiovascular: Positive for leg swelling  Negative for chest pain and palpitations  Gastrointestinal: Negative for abdominal distention, abdominal pain, anal bleeding, blood in stool, constipation, diarrhea, nausea, rectal pain and vomiting  Genitourinary: Negative for decreased urine volume, difficulty urinating, enuresis, frequency, hematuria and urgency  Musculoskeletal: Negative for arthralgias and myalgias  Skin: Negative for color change, pallor, rash and wound  Neurological: Negative for dizziness, seizures, syncope, light-headedness, numbness and headaches  Psychiatric/Behavioral: Negative          Historical Information   Past Medical History:   Diagnosis Date    Anxiety     Arthritis     Atrial fibrillation ([facility] )     h/o atrial fib/ flutter    Cardiac disease     Cardiomyopathy, ischemic     last assessed: 12/8/2015    Chronic kidney disease     last assessed: 12/8/2015    Disease of thyroid gland     Dysphagia     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    Esophageal cancer [facility]     [facility]     GERD (gastroesophageal reflux disease)     Hernia, abdominal     chronic umbilical    Hyperlipidemia     Hypertension      Past Surgical History:   Procedure Laterality Date    CARDIAC PACEMAKER PLACEMENT      pacemaker/defibrilator    CARDIAC PACEMAKER PLACEMENT Left     placed 25 years ago   [address]      10/22/2015-pulse generatory replacement, pacemaker defibrillator; 7/27/2004 with defibrillator    COLONOSCOPY      (fiberoptic) screening    ESOPHAGOGASTRODUODENOSCOPY N/A 10/31/2017    Procedure: ESOPHAGOGASTRODUODENOSCOPY (EGD); Surgeon: Roxie Lord MD;  Location[facility] GI [facility]; Service: Gastroenterology    ESOPHAGOGASTRODUODENOSCOPY N/A 11/17/2017    Procedure: ESOPHAGOGASTRODUODENOSCOPY (EGD) WITH STENT;  Surgeon: Roxie Lord MD;  Location: [facility];  Service: Gastroenterology    ESOPHAGOGASTRODUODENOSCOPY N/A 12/22/2017    Procedure: ESOPHAGOGASTRODUODENOSCOPY (EGD) with stent retrieval;  Surgeon: Sally Severe, MD;  Location: [address] [place] [address];  Service: Gastroenterology    ESOPHAGOGASTRODUODENOSCOPY N/A 1/11/2018    Procedure: ESOPHAGOGASTRODUODENOSCOPY (EGD) WITH STENT PLACEMENT;  Surgeon: Roxie Lord MD;  Location: [facility];  Service: Gastroenterology    FRACTURE SURGERY      LEG SURGERY      TIBIA FRACTURE SURGERY      orif left leg 1986     Social History   History   Alcohol Use No     History   Drug Use No     History   Smoking Status    Former Smoker    Packs/day: 1 00    Types: Cigarettes    Quit date: 11/16/1997   Smokeless Tobacco    Never Used     Family History:   Family History   Problem Relation Age of Onset    Cervical cancer Mother     Sudden death Mother      sudden cardiac death (SCD)       Meds/Allergies   PTA meds:   Prior to Admission Medications   Prescriptions Last Dose Informant Patient Reported? Taking?    Multiple Vitamin (MULTIVITAMIN) tablet   Yes No   Sig: Take 1 tablet by mouth daily   amiodarone 200 mg tablet   No No   Sig: Take 1 tablet by mouth 2 (two) times a day   aspirin (ASPIRIN LOW DOSE) 81 MG tablet   Yes No   Sig: Take by mouth   clindamycin (CLEOCIN) 300 MG capsule   No No   Sig: Take 1 capsule (300 mg total) by mouth 4 (four) times a day for 7 days   furosemide (LASIX) 40 mg tablet   Yes No   Sig: Take by mouth daily     levothyroxine 25 mcg tablet   Yes No   Sig: Take 25 mcg by mouth daily     lisinopril (ZESTRIL) 5 mg tablet   Yes No   Sig: Take 5 mg by mouth     metoprolol succinate (TOPROL-XL) 50 mg 24 hr tablet   Yes No   Sig: Take 50 mg by mouth daily Takes 3 tabs    omeprazole (PriLOSEC) 40 MG capsule   Yes No   Sig: Take by mouth daily     pravastatin (PRAVACHOL) 80 mg tablet   Yes No   Sig: Take by mouth daily     promethazine (PHENERGAN) 12 5 mg/10 mL syrup   Yes No   Sig: Take 10 mL by mouth 3 (three) times a day   rivaroxaban (XARELTO) 20 mg tablet   No No   Sig: Take 1 tablet by mouth daily with breakfast   Patient taking differently: Take 20 mg by mouth daily with breakfast Last dose end of oct  sertraline (ZOLOFT) 50 mg tablet   Yes No   Sig: Take 50 mg by mouth daily     sucralfate (CARAFATE) 1 g/10 mL suspension   No No   Sig: Take 10 mL by mouth 4 (four) times a day (before meals and at bedtime)   traZODone (DESYREL) 150 mg tablet   Yes No   Sig: Take 150 mg by mouth daily at bedtime        Facility-Administered Medications: None     No Known Allergies    Objective   First Vitals:   Blood Pressure: 110/51 (02/05/18 1051)  Pulse: 69 (02/05/18 1051)  Temperature: (!) 96 4 °F (35 8 °C) (02/05/18 1048)  Temp Source: Tympanic (02/05/18 1048)  Respirations: 16 (02/05/18 1051)  Height: 5' 7" (170 2 cm) (02/05/18 1051)  Weight - Scale: 109 kg (240 lb) (02/05/18 1051)  SpO2: 94 % (02/05/18 1051)    Current Vitals:   Blood Pressure: 141/63 (02/05/18 1215)  Pulse: 70 (02/05/18 1215)  Temperature: (!) 96 4 °F (35 8 °C) (02/05/18 1051)  Temp Source: Tympanic (02/05/18 1051)  Respirations: 16 (02/05/18 1051)  Height: 5' 7" (170 2 cm) (02/05/18 1051)  Weight - Scale: 109 kg (240 lb) (02/05/18 1051)  SpO2: 94 % (02/05/18 1215)    No intake or output data in the 24 hours ending 02/05/18 1225    Invasive Devices     Peripherally Inserted Central Catheter Line            PICC Line 02/05/18 less than 1 day    PICC Line 02/05/18 Right less than 1 day                Physical Exam   Constitutional: He is oriented to person, place, and time   He appears well-developed and well-nourished  No distress  HENT:   Head: Normocephalic and atraumatic  Dry mucous membranes  Eyes: Conjunctivae are normal  Pupils are equal, round, and reactive to light  Right eye exhibits no discharge  Left eye exhibits no discharge  Neck: Normal range of motion  Neck supple  Cardiovascular: Normal rate, regular rhythm, normal heart sounds and intact distal pulses  Exam reveals no gallop and no friction rub  No murmur heard  Pulmonary/Chest: Effort normal and breath sounds normal  No respiratory distress  He has no wheezes  He has no rales  He exhibits no tenderness  Abdominal: Soft  Bowel sounds are normal  He exhibits no distension and no mass  There is no tenderness  There is no rebound and no guarding  Musculoskeletal:   Minimal lower extremity edema    Neurological: He is alert and oriented to person, place, and time  Skin: Skin is warm and dry  No rash noted  He is not diaphoretic  No erythema  No pallor  Large right shoulder abscess  Dressing present and is C/D/I  Psychiatric: He has a normal mood and affect   His behavior is normal        Lab Results:   Results for orders placed or performed during the hospital encounter of 02/05/18   CBC and differential   Result Value Ref Range    WBC 9 00 4 80 - 10 80 Thousand/uL    RBC 2 84 (L) 4 70 - 6 10 Million/uL    Hemoglobin 7 9 (L) 14 0 - 18 0 g/dL    Hematocrit 24 7 (L) 42 0 - 52 0 %    MCV 87 82 - 98 fL    MCH 27 9 27 0 - 31 0 pg    MCHC 32 1 31 4 - 37 4 g/dL    RDW 15 4 (H) 11 6 - 15 1 %    MPV 7 9 (L) 8 9 - 12 7 fL    Platelets 547 039 - 088 Thousands/uL    nRBC 0 /100 WBCs    Neutrophils Relative 79 (H) 43 - 75 %    Lymphocytes Relative 14 14 - 44 %    Monocytes Relative 4 4 - 12 %    Eosinophils Relative 2 0 - 6 %    Basophils Relative 0 0 - 1 %    Neutrophils Absolute 7 10 1 85 - 7 62 Thousands/µL    Lymphocytes Absolute 1 20 0 60 - 4 47 Thousands/µL    Monocytes Absolute 0 40 0 17 - 1 22 Thousand/µL Eosinophils Absolute 0 20 0 00 - 0 61 Thousand/µL    Basophils Absolute 0 00 0 00 - 0 10 Thousands/µL   Comprehensive metabolic panel   Result Value Ref Range    Sodium 136 136 - 145 mmol/L    Potassium 3 5 3 5 - 5 3 mmol/L    Chloride 97 (L) 100 - 108 mmol/L    CO2 30 21 - 32 mmol/L    Anion Gap 9 4 - 13 mmol/L    BUN 65 (H) 5 - 25 mg/dL    Creatinine 3 27 (H) 0 60 - 1 30 mg/dL    Glucose 116 65 - 140 mg/dL    Calcium 9 4 8 3 - 10 1 mg/dL    AST 21 5 - 45 U/L    ALT 19 12 - 78 U/L    Alkaline Phosphatase 54 46 - 116 U/L    Total Protein 7 5 6 4 - 8 2 g/dL    Albumin 2 9 (L) 3 5 - 5 0 g/dL    Total Bilirubin 0 30 0 20 - 1 00 mg/dL    eGFR 17 ml/min/1 73sq m     Imaging:   No orders to display       Code Status: Full

## 2018-02-05 NOTE — CONSULTS
Consultation - General Surgery   Maurice Galo 69 y o  male MRN: [id number]  Unit/Bed#: [address] Encounter: [id number]    Assessment/Plan     Assessment:  1) Cyst on posterior right shoulder - firm, indurated, non mobile, non tender, erythema present, clinically evaluated in past by Dr Christopher Tanner, likely epidermoid or pilar cyst secondarily infected as compared to abscess based on current clinical findings  2) acute on chronic kidney injury - 1 72 12/22/17 -> 3 27 2/5/18, possibly pre renal due to anemia  3)  Normocytic Anemia - 7 9 and asymptomatic, 11 8 12/22 17, stable currently, possibly due to blood loss, denies colorectal bleeding with BM, or possibly due to anticoagulant  Plan:  1) Cyst  -  NPO at midnight  - added case on for tomorrow 2/6/18  - hold Xarelto   - consent to be obtained by Dr Christopher Tanner  - medical clearance per [facility] practice  - procedure will be done under local and light sedation kidneys are not likely to be compromised  - abx per FP, likely beneficial to cover common skin contaminants  2) management and work-up per FP  - repeat CBC and BMP in am  3) in light of being normocytic anemia possibly due to blood loss  - work-up per FP   - hold anticoagulant  - H/H Q 6hours  - hemoccult X 3  - GI consult if warranted    History of Present Illness   HPI:  Maurice Galo is a 69 y o  male with past medical history of esophageal cancer, disease of thyroid, chronic kidney disease, dysphagia, atrial fibrillation presenting to the acute care surgery team for right shoulder lesion  Patient reports that he has never seen at her felt it but on the back of his right shoulder he has some sort of cyst or abscess her physicians that he seen in the past   Patient cannot denote any fluctuance, warmth, swelling based on the fact that he has not been able to to feel or see the backside of the shoulder  Patient denies any pain on the back side of his shoulder  Patient denies any fevers or chills    Patient reports that it was seen by a provider of which name he cannot remember (likely Dr Paulina Martinez) who told him in needed to be removed  Patient reports that he is okay with having a surgical excision  Refer to review of systems for any other questions regarding acute on chronic kidney injury and anemia  Inpatient consult to Acute Care Surgery  Consult performed by: Joanna Avila ordered by: Carrillo Frederick          Review of Systems   Constitutional: Negative for chills, fatigue and fever  Respiratory: Negative for apnea, chest tightness and shortness of breath  Cardiovascular: Negative for chest pain and palpitations  Gastrointestinal: Negative for abdominal pain, blood in stool, constipation, diarrhea and vomiting  Genitourinary: Negative for difficulty urinating and dysuria  Skin: Negative for color change  Neurological: Negative for dizziness, seizures, syncope, weakness and numbness  Historical Information   Past Medical History:   Diagnosis Date    Anxiety     Arthritis     At[facility] fibrillation [facility]     h/o atrial fib/ flutter    Cardiac disease     Cardiomyopathy, ischemic     last assessed: 12/8/2015    Chronic kidney disease     last assessed: 12/8/2015    Disease of thyroid gland     Dysphagia     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    Es[facility]     Esophageal cancer [facility]     GERD (gastroesophageal reflux disease)     Hernia, abdominal     chronic umbilical    Hyperlipidemia     Hypertension      Past Surgical History:   Procedure Laterality Date    CARDIAC PACEMAKER PLACEMENT      pacemaker/defibrilator    CARDIAC PACEMAKER PLACEMENT Left     placed 25 years ago   [address] [place] [address]      10/22/2015-pulse generatory replacement, pacemaker defibrillator; 7/27/2004 with defibrillator    COLONOSCOPY      (fiberoptic) screening    ESOPHAGOGASTRODUODENOSCOPY N/A 10/31/2017    Procedure: ESOPHAGOGASTRODUODENOSCOPY (EGD);   Surgeon: Rashad Callahan Easton Phipps MD;  Location: [facility] GI LAB;   Service: Gastroenterology    ESOPHAGOGASTRODUODENOSCOPY N/A 11/17/2017    Procedure: ESOPHAGOGASTRODUODENOSCOPY (EGD) WITH STENT;  Surgeon: Garry Savage MD;  Location: [facility];  Service: Gastroenterology    ESOPHAGOGASTRODUODENOSCOPY N/A 12/22/2017    Procedure: ESOPHAGOGASTRODUODENOSCOPY (EGD) with stent retrieval;  Surgeon: Joyce Wheeler MD;  Location: [address] [place] [address];  Service: Gastroenterology    ESOPHAGOGASTRODUODENOSCOPY N/A 1/11/2018    Procedure: ESOPHAGOGASTRODUODENOSCOPY (EGD) WITH STENT PLACEMENT;  Surgeon: Garry Savage MD;  Location: [facility];  Service: Gastroenterology    FRACTURE SURGERY      LEG SURGERY      TIBIA FRACTURE SURGERY      orif left leg 1986     Social History   History   Alcohol Use No     History   Drug Use No     History   Smoking Status    Former Smoker    Packs/day: 1 00    Years: 25 00    Types: Cigarettes    Quit date: 11/16/1997   Smokeless Tobacco    Never Used     Family History: non-contributory    Meds/Allergies   all current active meds have been reviewed and current meds:   Current Facility-Administered Medications   Medication Dose Route Frequency    acetaminophen (TYLENOL) tablet 650 mg  650 mg Oral Q6H PRN    amiodarone tablet 200 mg  200 mg Oral BID    aspirin chewable tablet 81 mg  81 mg Oral Daily    bisacodyl (DULCOLAX) EC tablet 10 mg  10 mg Oral Once    [START ON 2/6/2018] ceFAZolin (ANCEF) IVPB (premix) 1,000 mg  1,000 mg Intravenous 30 min pre-procedure    clindamycin (CLEOCIN) capsule 300 mg  300 mg Oral 4x Daily    [START ON 2/6/2018] heparin (porcine) subcutaneous injection 5,000 Units  5,000 Units Subcutaneous 30 min pre-procedure    [START ON 2/6/2018] levothyroxine tablet 25 mcg  25 mcg Oral Daily    metoprolol succinate (TOPROL-XL) 24 hr tablet 50 mg  50 mg Oral Daily    [START ON 2/6/2018] polyethylene glycol (GOLYTELY) bowel prep 4,000 mL  4,000 mL Oral Once    pravastatin (PRAVACHOL) tablet 80 mg  80 mg Oral Daily With Dinner    promethazine (PHENERGAN) 12 5 mg/10 mL oral syrup 12 5 mg  12 5 mg Oral TID    sertraline (ZOLOFT) tablet 50 mg  50 mg Oral Daily    sodium chloride 0 9 % infusion  125 mL/hr Intravenous Continuous    sucralfate (CARAFATE) oral suspension 1,000 mg  1,000 mg Oral 4x Daily (AC & HS)    traZODone (DESYREL) tablet 150 mg  150 mg Oral HS     Facility-Administered Medications Ordered in Other Encounters   Medication Dose Route Frequency    fentaNYL (SUBLIMAZE) injection 25 mcg  25 mcg Intravenous PRN    ondansetron (ZOFRAN) injection 4 mg  4 mg Intravenous Once PRN    promethazine (PHENERGAN) injection 12 5 mg  12 5 mg Intravenous Once PRN     No Known Allergies    Objective   First Vitals:   Blood Pressure: 110/51 (02/05/18 1051)  Pulse: 69 (02/05/18 1051)  Temperature: (!) 96 4 °F (35 8 °C) (02/05/18 1048)  Temp Source: Tympanic (02/05/18 1048)  Respirations: 16 (02/05/18 1051)  Height: 5' 7" (170 2 cm) (02/05/18 1051)  Weight - Scale: 109 kg (240 lb) (02/05/18 1051)  SpO2: 94 % (02/05/18 1051)    Current Vitals:   Blood Pressure: 114/55 (02/05/18 1604)  Pulse: 74 (02/05/18 1604)  Temperature: 98 2 °F (36 8 °C) (02/05/18 1604)  Temp Source: Oral (02/05/18 1604)  Respirations: 18 (02/05/18 1604)  Height: 5' 7" (170 2 cm) (02/05/18 1215)  Weight - Scale: 109 kg (240 lb) (02/05/18 1215)  SpO2: 92 % (02/05/18 1604)    No intake or output data in the 24 hours ending 02/05/18 1709    Invasive Devices     Peripherally Inserted Central Catheter Line            PICC Line 02/05/18 Right less than 1 day                Physical Exam   Constitutional: He is oriented to person, place, and time  Vital signs are normal  He appears well-developed and well-nourished  He does not have a sickly appearance  He does not appear ill  No distress  He is not intubated  HENT:   Head: Normocephalic and atraumatic     Right Ear: Hearing and external ear normal    Left Ear: Hearing and external ear normal    Nose: Nose normal    Cardiovascular: Normal rate, regular rhythm, S1 normal, S2 normal and normal heart sounds  Exam reveals no gallop, no S3, no S4, no distant heart sounds and no friction rub  No murmur heard  Pulses:       Radial pulses are 2+ on the right side, and 2+ on the left side  Pulmonary/Chest: Effort normal  No accessory muscle usage  No apnea, no tachypnea and no bradypnea  He is not intubated  No respiratory distress  He has no decreased breath sounds  He has no wheezes  He has no rhonchi  He has no rales  Abdominal: Soft  Normal appearance  Neurological: He is alert and oriented to person, place, and time  No sensory deficit  GCS eye subscore is 4  GCS verbal subscore is 5  GCS motor subscore is 6  Skin: Rash noted  Rash is maculopapular  There is erythema  Lab Results:   I have personally reviewed pertinent lab results  , CBC:   Lab Results   Component Value Date    WBC 9 00 02/05/2018    HGB 7 9 (L) 02/05/2018    HCT 24 7 (L) 02/05/2018    MCV 87 02/05/2018     02/05/2018    MCH 27 9 02/05/2018    MCHC 32 1 02/05/2018    RDW 15 4 (H) 02/05/2018    MPV 7 9 (L) 02/05/2018    NRBC 0 02/05/2018   , CMP:   Lab Results   Component Value Date     02/05/2018    K 3 5 02/05/2018    CL 97 (L) 02/05/2018    CO2 30 02/05/2018    ANIONGAP 9 02/05/2018    BUN 65 (H) 02/05/2018    CREATININE 3 27 (H) 02/05/2018    GLUCOSE 116 02/05/2018    CALCIUM 9 4 02/05/2018    AST 21 02/05/2018    ALT 19 02/05/2018    ALKPHOS 54 02/05/2018    PROT 7 5 02/05/2018    BILITOT 0 30 02/05/2018    EGFR 17 02/05/2018     Imaging: I have personally reviewed pertinent reports  EKG, Pathology, and Other Studies: I have personally reviewed pertinent reports  Counseling / Coordination of Care  Total floor / unit time spent today 35 minutes  Greater than 50% of total time was spent with the patient and / or family counseling and / or coordination of care    A description of the counseling / coordination of care: developing plan, reviewing with attending, coordinating with FP team

## 2018-02-05 NOTE — CONSULTS
Hematology Oncology Consult Report    Eloisa Domingo, 1940, [id number]  [address] [facility] [phone]-*    Impression and plan:    3 36-year-old male recently diagnosed with (at least) stage IIIA esophageal adenocarcinoma  Patient was to begin concurrent treatment with paclitaxel/carboplatin + radiotherapy (definitive treatment) this week  Because of the anemia and renal issues, this is on hold  This is obviously unfortunate for a number of reasons especially that Mr Natalie Bass has been delayed for a number of reasons over the past few months  The longer the cancer has time to grow, the more likely the disease will spread  As soon as patient is cleared from a surgical standpoint and the renal function is better and the hemoglobin level is better/stable, patient can begin treatment  2  Anemia  Etiology is unclear  Differential diagnosis includes secondary to GI bleeding (although guaiacs were negative), possibly anemia due to renal insufficiency  Patient received 2 units - hemoglobin level is better  Continue to monitor  3  Elevated BUN and creatinine  Patient is receiving IV hydration  Renal function is being monitored  4  Right shoulder abscess  Patient is status post I&D  Postoperative care with Dr Harris Sepulveda is ongoing  The above was discussed with the patient; all questions were answered  Will follow with you  Please do not hesitate to contact the Hematology service if you have any questions or concerns  Thank you for this consult     ___________________________________________________________________________________________________________________      Chief complaint:  Progressive anemia, elevated BUN and creatinine    History of present illness: This is a 36-year-old male previously diagnosed with IIIA esophageal cancer sent to the emergency room because of an elevated BUN/creatinine and new onset anemia   Patient was to begin concurrent treatment on February 5, 2018 that was sent to the emergency room after pre-chemotherapy blood work demonstrated a significant drop in hemoglobin level and a significant rise in the BUN/creatinine  Mr Rosina Barros just came from the operating room  Patient had I&D of the right shoulder abscess  Patient states feeling okay, same as before  No pain control issues  No nausea vomiting  Patient denies any recent bleeding from the GI or  tract  No fevers, chills or sweats  Activities are limited, same as before  Cancer history: The below note comes from the outpatient oncology office visit from December 19, 2017  History of present illness    81-year-old male recently referred for the above  Mr Rosina Barros states having progressive dysphagia over the past 2 or 3 months  Patient states that the food would get stuck midway down  No nausea or vomiting  No abdominal pain or constipation  Mr Rosina Barros states that he has had problems with diarrhea recently, taking Pepto-Bismol  Patient believes that he has lost 10 pounds over the past 3 or 4 months  Presently patient states feeling okay, same as before  No fevers, chills or sweats  No chest pain or pressure  No headaches, blurred vision or dizziness  No  issues  No recent cardiac issues  No pain control issues  Appetite is good, weight is stable  No issues swallowing, no dysphagia or odynophagia  Discussion/summary    81-year-old male with a number of medical problems recently diagnosed with esophageal adenocarcinoma  Mr Rosina Barros feels relatively well and clinically there are no obvious signs of metastatic disease  Issues:    1  Esophageal cancer  PET/CT did not demonstrate any evidence of metastatic disease although there were lymph nodes that had increased FDG activity  Any T N1 = stage IIIA; any T N2 = stage IIIB  NCCN guidelines state that for any T, N+ disease, primary treatment includes preoperative chemoradiation (category 1, paclitaxel and carboplatin weekly during RT)    Although the specifics are not presently clear, patient cannot be seen by radiation oncology in [place]  Mr Henri Perez has been referred to Dr Rios Cool  I discussed the case at length with her today - she will see the patient ASAP  She is also concerned that the patient has never had an EUS (not sure if this is possible now that there is a stent in place)  She will also refer the patient to 1 of her oncologic surgeons (to see if Mr Henri Perez will be a candidate for surgery eventually)  2 Stent placement  Follow-up with GI is ongoing  3  Multiple other medical and cardiac issues, elevated creatinine level  Patient will follow-up with [facility] as directed  The above was discussed with patient and friend; all questions were answered  Mr Henri Perez is to return in 2 weeks but this may change depending upon the above  Patient knows to call if he has any other oncology questions or concerns  Past medical history:   Past Medical History:   Diagnosis Date    Anxiety     Arthritis     Atrial fibrillation ([address] )     h/o atrial fib/ flutter    Cardiac disease     Cardiomyopathy, ischemic     last assessed: 12/8/2015    Chronic kidney disease     last assessed: 12/8/2015    Disease of thyroid gland     Dysphagia     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    Esophageal cancer ([address] )     Esophageal cancer ([address] )     GERD (gastroesophageal reflux disease)     Hernia, abdominal     chronic umbilical    Hyperlipidemia     Hypertension      Past surgical history:   Past Surgical History:   Procedure Laterality Date    CARDIAC PACEMAKER PLACEMENT      pacemaker/defibrilator    CARDIAC PACEMAKER PLACEMENT Left     placed 25 years ago   [address] [place] [address]      10/22/2015-pulse generatory replacement, pacemaker defibrillator; 7/27/2004 with defibrillator    COLONOSCOPY      (fiberoptic) screening    ESOPHAGOGASTRODUODENOSCOPY N/A 10/31/2017    Procedure: ESOPHAGOGASTRODUODENOSCOPY (EGD);   Surgeon: Christos Landeros MD;  Location: [place] [address] GI LAB;   Service: Gastroenterology    ESOPHAGOGASTRODUODENOSCOPY N/A 11/17/2017    Procedure: ESOPHAGOGASTRODUODENOSCOPY (EGD) WITH STENT;  Surgeon: Christos Landeros MD;  Location: [address] [place] [address];  Service: Gastroenterology    ESOPHAGOGASTRODUODENOSCOPY N/A 12/22/2017    Procedure: ESOPHAGOGASTRODUODENOSCOPY (EGD) with stent retrieval;  Surgeon: Meyer Peabody, MD;  Location: [address] [place] [address];  Service: Gastroenterology    ESOPHAGOGASTRODUODENOSCOPY N/A 1/11/2018    Procedure: ESOPHAGOGASTRODUODENOSCOPY (EGD) WITH STENT PLACEMENT;  Surgeon: Christos Landeros MD;  Location: [facility] OR;  Service: Gastroenterology    FRACTURE SURGERY      LEG SURGERY      TIBIA FRACTURE SURGERY      orif left leg 1986     Allergies: No Known Allergies    Home medications:   Prescriptions Prior to Admission   Medication    amiodarone 200 mg tablet    aspirin (ASPIRIN LOW DOSE) 81 MG tablet    clindamycin (CLEOCIN) 300 MG capsule    furosemide (LASIX) 40 mg tablet    levothyroxine 25 mcg tablet    lisinopril (ZESTRIL) 5 mg tablet    metoprolol succinate (TOPROL-XL) 50 mg 24 hr tablet    Multiple Vitamin (MULTIVITAMIN) tablet    omeprazole (PriLOSEC) 40 MG capsule    pravastatin (PRAVACHOL) 80 mg tablet    promethazine (PHENERGAN) 12 5 mg/10 mL syrup    rivaroxaban (XARELTO) 20 mg tablet    sertraline (ZOLOFT) 50 mg tablet    sucralfate (CARAFATE) 1 g/10 mL suspension    traZODone (DESYREL) 150 mg tablet     Hospital medications:   Current Facility-Administered Medications:     acetaminophen (TYLENOL) tablet 650 mg, 650 mg, Oral, Q6H PRN, Gretchen Talbert MD    amiodarone tablet 200 mg, 200 mg, Oral, BID, Gretchen Talbert MD    aspirin chewable tablet 81 mg, 81 mg, Oral, Daily, Gretchen Talbert MD, 81 mg at 02/05/18 1437    bisacodyl (DULCOLAX) EC tablet 10 mg, 10 mg, Oral, Once, Vicky Stein PA-C    [START ON 2/6/2018] ceFAZolin (ANCEF) IVPB (premix) 1,000 mg, 1,000 mg, Intravenous, 30 min pre-procedure, Darlene Quezada MD    clindamycin (CLEOCIN) capsule 300 mg, 300 mg, Oral, 4x Daily, Darlene Quezada MD, 300 mg at 02/05/18 1437    [START ON 2/6/2018] heparin (porcine) subcutaneous injection 5,000 Units, 5,000 Units, Subcutaneous, 30 min pre-procedure, Darlene Quezada MD  Cam Albino  [START ON 2/6/2018] levothyroxine tablet 25 mcg, 25 mcg, Oral, Daily, Darlene Quezada MD    metoprolol succinate (TOPROL-XL) 24 hr tablet 50 mg, 50 mg, Oral, Daily, Darlene Quezada MD, 50 mg at 02/05/18 1437    [START ON 2/6/2018] polyethylene glycol (GOLYTELY) bowel prep 4,000 mL, 4,000 mL, Oral, Once, Vicky Stein PA-C    pravastatin (PRAVACHOL) tablet 80 mg, 80 mg, Oral, Daily With Dinner, Darlene Quezada MD    promethazine (PHENERGAN) 12 5 mg/10 mL oral syrup 12 5 mg, 12 5 mg, Oral, TID, Darlene Quezada MD    sertraline (ZOLOFT) tablet 50 mg, 50 mg, Oral, Daily, Darlene Quezada MD, 50 mg at 02/05/18 1437    sodium chloride 0 9 % infusion, 125 mL/hr, Intravenous, Continuous, Dilia Alexandra DO, Last Rate: 125 mL/hr at 02/05/18 1216, 125 mL/hr at 02/05/18 1216    sucralfate (CARAFATE) oral suspension 1,000 mg, 1,000 mg, Oral, 4x Daily (AC & HS), Darlene Quezada MD    traZODone (DESYREL) tablet 150 mg, 150 mg, Oral, HS, Darlene Quezada MD    Facility-Administered Medications Ordered in Other Encounters:     fentaNYL (SUBLIMAZE) injection 25 mcg, 25 mcg, Intravenous, PRN, Brandie Bradshaw MD    [facility]ron [facility]) injection 4 mg, 4 mg, Intravenous, Once PRN, Brandie Bradshaw MD    promethazine [facility]) injection 12 5 mg, 12 5 mg, Intravenous, Once PRN, Brandie Bradshaw MD    Social history: , patient lives with son and grandchild, no alcohol or drug abuse, discontinued tobacco use 25 years ago, retired farming and [place], patient denies any toxic exposure other than farming chemicals    Family history:   Family History   Problem Relation Age of Onset    Cervical cancer Mother    Cam Posada Sudden death Mother      sudden cardiac death (SCD)     Review of systems: deferred    Physical exam  Vitals:    02/05/18 1604   BP: 114/55   Pulse: 74   Resp: 18   Temp: 98 2 °F (36 8 °C)   SpO2: 92%   Constitutional:  Obese male, no respiratory distress  Eyes:  PERRL, conjunctiva normal, anicteric   HENT:  Atraumatic, external ears normal, nose normal,   Oropharynx: moist, no pharyngeal exudates, no thrush, pink  Neck: No adenopathy, good range of motion  Respiratory: scattered bilateral rhonchi, fair air entry bilaterally  Cardiovascular:  Normal rate, normal rhythm, no murmurs, no gallops, no rubs   GI:  Soft, obese, cannot palpate liver or spleen, nontender, + bowel sounds, no rigidity or rebound  :  No costovertebral angle tenderness, normal reproductive organs, no discharge  Musculoskeletal: no pain or tenderness with palpation of joints, muscles or bones, full range of motion except for right upper extremity - decreased secondary to recent right shoulder abscess surgery  Integument: slightly pale, warm, moist, scattered ecchymoses, no petechiae  Lymphatic:  No lymphadenopathy in the neck, supraclavicular region, infraclavicular region, axilla and groin bilaterally  Extremities:  No lower extremity edema, no cords, pulses are 1+  Neurologic:  Alert & oriented x 3, CN 2-12 normal, normal motor function, normal sensory function, no focal deficits noted  Psychiatric:  Speech and behavior appropriate, response time delayed but the same as before  Rectal: Deferred    Laboratory test results    Results for Pamela Kern (MRN [id number]) as of 2/6/2018 12:45   Ref   Range 12/22/2017 13:48 2/5/2018 09:33 2/5/2018 18:46 2/6/2018 08:30   WBC Latest Ref Range: 4 80 - 10 80 Thousand/uL 6 20 9 00 7 00 9 30   RBC Latest Ref Range: 4 70 - 6 10 Million/uL 4 05 (L) 2 84 (L) 2 56 (L) 3 22 (L)   Hemoglobin Latest Ref Range: 14 0 - 18 0 g/dL 11 8 (L) 7 9 (L) 7 1 (L) 9 3 (L)   Hematocrit Latest Ref Range: 42 0 - 52 0 % 36 3 (L) 24 7 (L) 21 8 (L) 28 5 (L)   MCV Latest Ref Range: 82 - 98 fL 90 87 85 89   MCH Latest Ref Range: 27 0 - 31 0 pg 29 1 27 9 27 8 28 9   MCHC Latest Ref Range: 31 4 - 37 4 g/dL 32 5 32 1 32 7 32 6   RDW Latest Ref Range: 11 6 - 15 1 % 15 3 (H) 15 4 (H) 14 2 14 5   Platelets Latest Ref Range: 130 - 400 Thousands/uL 216 325 324 245   MPV Latest Ref Range: 8 9 - 12 7 fL 7 1 (L) 7 9 (L) 7 7 (L) 8 0 (L)     Results for Lola Chris (MRN [id number]) as of 2/6/2018 12:45   Ref  Range 12/22/2017 13:48 2/5/2018 09:33 2/5/2018 20:15 2/6/2018 08:30   BUN Latest Ref Range: 5 - 25 mg/dL 21 65 (H) 54 (H) 54 (H)   Creatinine Latest Ref Range: 0 60 - 1 30 mg/dL 1 72 (H) 3 27 (H) 2 52 (H) 2 46 (H)       Radiology reports    11/2/17 CAT scan soft tissue of the neck did not demonstrate any mass  Patient had shotty paratracheal adenopathy on the right  Patient had atherosclerotic calcification of the carotid bifurcation bilaterally with moderate left stenosis  CAT scan of the chest and abdomen/pelvis demonstrated a circumferential thickening of the distal esophagus consistent with a history of esophageal mass  Patient had missed I'll adenopathy suspicious for metastatic disease  Patient had a 4 mm left lower lobe pulmonary nodule and high-density material in the gallbladder  Patient has small hiatal hernia, diverticulosis and a ventral abdominal wall hernia containing a loop of bowel  Patient had bilateral inguinal hernias right greater than left  The right-sided hernia containing portion of the bladder       PET 12/1/17 PET/CT demonstrated intense FDG activity over approximately a 6 cm segment of the distal esophagus, SUV = 15 2  Patient had 2 right paraesophageal nodes with mild hypermetabolism as well as mild right hilar activity  No other evidence of metastases in the neck, abdomen or pelvis  Patient had left basilar infiltrates/atelectasis and small bilateral pleural effusions   Patient had prostatic calcifications with somewhat prominent FDG activity, SUV = 5 3  Pathology     10/31/17 distal esophageal tumor biopsy demonstrated poorly differentiated adenocarcinoma in the background of intestinal metaplasia

## 2018-02-05 NOTE — ED PROVIDER NOTES
History  Chief Complaint   Patient presents with    Abnormal Lab     Pt states he had a picc line inerted today - brought to ED by infusion [facility] , reason not given to registration - pt unsure as to why he was brought to the ED     32-year-old male presents from infusion [place] where he was due to get infusion for his chemo drugs  Patient Dr Judd Bro called me and stated that his hemoglobin had gone from 11 down to 7 since December and his creatinine has gone from 1 6-3 2 they are not sure why this happened however the called PCP in a advised go to the ED for admission the hospital         History provided by:  Patient and caregiver   used: No        Prior to Admission Medications   Prescriptions Last Dose Informant Patient Reported? Taking? Multiple Vitamin (MULTIVITAMIN) tablet   Yes No   Sig: Take 1 tablet by mouth daily   amiodarone 200 mg tablet   No No   Sig: Take 1 tablet by mouth 2 (two) times a day   aspirin (ASPIRIN LOW DOSE) 81 MG tablet   Yes No   Sig: Take by mouth   clindamycin (CLEOCIN) 300 MG capsule   No No   Sig: Take 1 capsule (300 mg total) by mouth 4 (four) times a day for 7 days   furosemide (LASIX) 40 mg tablet   Yes No   Sig: Take by mouth daily     levothyroxine 25 mcg tablet   Yes No   Sig: Take 25 mcg by mouth daily     lisinopril (ZESTRIL) 5 mg tablet   Yes No   Sig: Take 5 mg by mouth     metoprolol succinate (TOPROL-XL) 50 mg 24 hr tablet   Yes No   Sig: Take 50 mg by mouth daily Takes 3 tabs    omeprazole (PriLOSEC) 40 MG capsule   Yes No   Sig: Take by mouth daily     pravastatin (PRAVACHOL) 80 mg tablet   Yes No   Sig: Take by mouth daily     promethazine (PHENERGAN) 12 5 mg/10 mL syrup   Yes No   Sig: Take 10 mL by mouth 3 (three) times a day   rivaroxaban (XARELTO) 20 mg tablet   No No   Sig: Take 1 tablet by mouth daily with breakfast   Patient taking differently: Take 20 mg by mouth daily with breakfast Last dose end of oct      sertraline (ZOLOFT) 50 mg tablet   Yes No   Sig: Take 50 mg by mouth daily     sucralfate (CARAFATE) 1 g/10 mL suspension   No No   Sig: Take 10 mL by mouth 4 (four) times a day (before meals and at bedtime)   traZODone (DESYREL) 150 mg tablet   Yes No   Sig: Take 150 mg by mouth daily at bedtime        Facility-Administered Medications: None       Past Medical History:   Diagnosis Date    Anxiety     Arthritis     At[address] )     h/o atrial fib/ flutter    Cardiac disease     Cardiomyopathy, ischemic     last assessed: 12/8/2015    Chronic kidney disease     last assessed: 12/8/2015    Disease of thyroid gland     Dysphagia     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    Esophageal cancer ([facility] )     Esophageal cancer ([facility] )     GERD (gastroesophageal reflux disease)     Hernia, abdominal     chronic umbilical    Hyperlipidemia     Hypertension        Past Surgical History:   Procedure Laterality Date    CARDIAC PACEMAKER PLACEMENT      pacemaker/defibrilator    CARDIAC PACEMAKER PLACEMENT Left     placed 25 years ago   [address] [place] [address]      10/22/2015-pulse generatory replacement, pacemaker defibrillator; 7/27/2004 with defibrillator    COLONOSCOPY      (fiberoptic) screening    ESOPHAGOGASTRODUODENOSCOPY N/A 10/31/2017    Procedure: ESOPHAGOGASTRODUODENOSCOPY (EGD); Surgeon: Ralph Lewis MD;  Location: [facility] GI LAB;   Service: Gastroenterology    ESOPHAGOGASTRODUODENOSCOPY N/A 11/17/2017    Procedure: ESOPHAGOGASTRODUODENOSCOPY (EGD) WITH STENT;  Surgeon: Ralph Lewis MD;  Location: [address] [place] [address];  Service: Gastroenterology    ESOPHAGOGASTRODUODENOSCOPY N/A 12/22/2017    Procedure: ESOPHAGOGASTRODUODENOSCOPY (EGD) with stent retrieval;  Surgeon: Arie Soni MD;  Location: [address] [place] [address];  Service: Gastroenterology    ESOPHAGOGASTRODUODENOSCOPY N/A 1/11/2018    Procedure: ESOPHAGOGASTRODUODENOSCOPY (EGD) WITH STENT PLACEMENT;  Surgeon: Ralph Lewis MD;  Location: [address] [place] [address];  Service: Gastroenterology    FRACTURE SURGERY      LEG SURGERY      TIBIA FRACTURE SURGERY      orif left leg 1986       Family History   Problem Relation Age of Onset    Cervical cancer Mother     Sudden death Mother      sudden cardiac death (SCD)     I have reviewed and agree with the history as documented  Social History   Substance Use Topics    Smoking status: Former Smoker     Packs/day: 1 00     Types: Cigarettes     Quit date: 11/16/1997    Smokeless tobacco: Never Used    Alcohol use No        Review of Systems   Constitutional: Negative for activity change, chills, diaphoresis and fever  HENT: Negative for congestion, ear pain, nosebleeds, sore throat, trouble swallowing and voice change  Eyes: Negative for pain, discharge and redness  Respiratory: Negative for apnea, cough, choking, shortness of breath, wheezing and stridor  Cardiovascular: Negative for chest pain and palpitations  Gastrointestinal: Negative for abdominal distention, abdominal pain, constipation, diarrhea, nausea and vomiting  Endocrine: Negative for polydipsia  Genitourinary: Negative for difficulty urinating, dysuria, flank pain, frequency, hematuria and urgency  Musculoskeletal: Negative for back pain, gait problem, joint swelling, myalgias, neck pain and neck stiffness  Skin: Negative for pallor and rash  Neurological: Positive for weakness  Negative for dizziness, tremors, syncope, speech difficulty, numbness and headaches  Hematological: Negative for adenopathy  Psychiatric/Behavioral: Negative for confusion, hallucinations, self-injury and suicidal ideas  The patient is not nervous/anxious          Physical Exam  ED Triage Vitals   Temperature Pulse Respirations Blood Pressure SpO2   02/05/18 1048 02/05/18 1051 02/05/18 1051 02/05/18 1051 02/05/18 1051   (!) 96 4 °F (35 8 °C) 69 16 110/51 94 %      Temp Source Heart Rate Source Patient Position - Orthostatic VS BP Location FiO2 (%)   02/05/18 1048 02/05/18 1051 02/05/18 1051 02/05/18 1051 --   Tympanic Monitor Sitting Left arm       Pain Score       02/05/18 1051       No Pain           Orthostatic Vital Signs  Vitals:    02/05/18 1051   BP: 110/51   Pulse: 69   Patient Position - Orthostatic VS: Sitting       Physical Exam   Constitutional: He is oriented to person, place, and time  Vital signs are normal  He appears well-developed and well-nourished  HENT:   Head: Normocephalic and atraumatic  Right Ear: External ear normal    Left Ear: External ear normal    Nose: Nose normal    Mouth/Throat: Oropharynx is clear and moist    Eyes: EOM are normal  Pupils are equal, round, and reactive to light  Neck: Normal range of motion  Neck supple  Cardiovascular: Normal rate, regular rhythm, normal heart sounds and intact distal pulses  Pulmonary/Chest: Effort normal and breath sounds normal    Abdominal: Soft  Bowel sounds are normal    Musculoskeletal: Normal range of motion  Neurological: He is alert and oriented to person, place, and time  Skin: Skin is warm  Psychiatric: He has a normal mood and affect  Nursing note and vitals reviewed  ED Medications  Medications   sodium chloride 0 9 % infusion (not administered)       Diagnostic Studies  Results Reviewed     None                 No orders to display              Procedures  Procedures       Phone Contacts  ED Phone Contact    ED Course  ED Course                                MDM  Number of Diagnoses or Management Options  Acute renal injury [facility]): Anemia:   Diagnosis management comments: Rectal guiaic negative      CritCare Time    Disposition  Final diagnoses:   Anemia   Acute renal injury ([address] )     Time reflects when diagnosis was documented in both MDM as applicable and the Disposition within this note     Time User Action Codes Description Comment    2/5/2018 11:58 AM Devika RUEDA Add [D64 9] Anemia     2/5/2018 11:58 AM Devika Nephaleksandr E Add [N17 9] Acute renal injury ([facility] [address] ) ED Disposition     ED Disposition Condition Comment    Admit  Case was discussed with  Dr Tamara Woodson and the patient's admission status was agreed to be Admission Status: observation status to the service of Dr Tamara Woodson   Follow-up Information    None       Patient's Medications   Discharge Prescriptions    No medications on file     No discharge procedures on file      ED Provider  Electronically Signed by           Enio Krishna DO  02/05/18 7066

## 2018-02-05 NOTE — ANESTHESIA PREPROCEDURE EVALUATION
Atrial flutter with rapid ventricular response (HCC)      Hypothyroidism   Anxiety disorder   Body mass index (BMI) of 45 0-49 9 in adult [facility])      Chronic kidney disease   Chronic coronary artery disease   Mixed anxiety depressive disorder   Gastroesophageal reflux disease   Multiple-type hyperlipidemia   Presence of cardiac pacemaker   Esophageal cancer (HCC)   Esophageal dysphagia   Infected cyst of right shoulder       Review of Systems/Medical History  Patient summary reviewed  Chart reviewed      Cardiovascular  Hyperlipidemia, Hypertension , CAD, , Dysrhythmias, atrial fibrillation,   Comment: NM myocardial perfusion spect (rx stress and/or rest)   Status: Final result  PACS Images     Show images for NM myocardial perfusion spect (rx stress and/or rest)  Order Report      Order Details   Study Result     St  [address]  [address] [facility] 6 [phone]     Stress Gated SPECT Myocardial Perfusion Imaging after Regadenoson     Patient: Oumar Culver  MR number: [id number]  Account number: [de-identified]  : 1940  Age: 77 years  Gender: Male  Status: Outpatient  Location:  Height: 67 in  Weight: 257 lb  BP: 162/ 69 mmHg     Allergies: NO KNOWN ALLERGIES     Diagnosis: R07 9 - Chest pain, unspecified     Primary Physician: Khang Hector DO  RN:  Diana Thompson RN  Referring Physician:  Michael Main DO  Group:  Judith Osborne  Interpreting Physician:  Michael Main DO     INDICATIONS: Coronary artery disease      HISTORY: The patient is a 77year old  male  Chest pain status: chest pain  Coronary artery disease risk factors: hypertension  Cardiovascular history: congestive heart failure  Prior cardiovascular procedures: pacemaker    Medications: a beta blocker, a diuretic, aspirin, an antiarrhythmic agent, an antihypertensive agent, and thyroid medications      REST ECG: Paced rhythm      PROCEDURE: A regadenoson infusion pharmacologic stress test was performed  Gated SPECT myocardial perfusion imaging was performed during stress  Systolic blood pressure was 162 mmHg, at the start of the study  Diastolic blood pressure was  69 mmHg, at the start of the study  The heart rate was 70 bpm, at the start of the study  Regadenoson protocol:  HR bpm SBP mmHg DBP mmHg Symptoms ST change Rhythm/conduct  Baseline 70 162 69 -- -- --  1 min 70 149 59 none -- paced  2 min 78 135 65 same as above none --  3 min 77 140 65 same as above -- paced  4 min 77 137 63 none -- --  5 min 77 127 62 same as above same as above same as above  No medications or fluids given      STRESS SUMMARY: Duration of pharmacologic stress was 3 min  Maximal heart rate during stress was 93 bpm  The heart rate response to stress was normal  There was normal resting blood pressure with a hypertensive response to stress  The  rate-pressure product for the peak heart rate and blood pressure was 92869  There was no chest pain during stress  The stress test was terminated due to protocol completion  There were no stress arrhythmias or conduction abnormalities      ISOTOPE ADMINISTRATION:  The radiopharmaceutical was injected at the peak effect of pharmacologic stress      MYOCARDIAL PERFUSION IMAGING:  The image quality was good  Left ventricular size was normal      PERFUSION DEFECTS:  -  There was a moderate-sized, moderately severe, reversible myocardial perfusion defect of the septal and anterior wall      GATED SPECT:  The calculated left ventricular ejection fraction was 44 %  There was moderately reduced myocardial thickening and motion of the septal wall of the left ventricle      SUMMARY:  -  Stress results: There was normal resting blood pressure with a hypertensive response to stress  There was no chest pain during stress  -  Perfusion imaging: There was a moderate-sized, moderately severe, reversible myocardial perfusion defect of the septal and anterior wall    -  Gated SPECT: The calculated left ventricular ejection fraction was 44 %  There was moderately reduced myocardial thickening and motion of the septal wall of the left ventricle      IMPRESSIONS: Abnormal study after pharmacologic stress  There was a moderate amount of ischemia in the anterior region extending to the septal wall  Left ventricular systolic function was reduced, with regional wall motion abnormalities      Prepared and signed by  Izabel Arnold DO  Signed 2017 14:24:17     Linked Documents     View Image  Imaging     NM myocardial perfusion spect (rx stress and/or rest) (Order #[id number]) on 2017 - Imaging Information   Result History     NM myocardial perfusion spect (rx stress and/or rest) (Order #[id number]) on 2017 - Order Result History Report  Signed by     Signed Date/Time  Phone Pager  Eduin Barriga 2017 14:24 [phone]     Echo complete with contrast if indicated   Status: Final result  PACS Images     Show images for Echo complete with contrast if indicated  Order Report      Order Details   Study Result     [facility]  [address] [address]  [phone]     Transthoracic Echocardiogram  2D, M-mode, Doppler, and Color Doppler     Study date:  31-Aug-2017     Patient: Kiet Vasquez  MR number: [id number]  Account number: [de-identified]  : 1940  Age: 77 years  Gender: Male  Status: Routine  Location: Bedside  Height: 67 in  Weight: 272 4 lb  BP: 93/ 52 mmHg     Indications: Tachycardia     Diagnoses: R00 0 - Tachycardia, unspecified     Sonographer:  MIHAI Esposito  Primary Physician: Zuri Mora DO  Group:  Mónica Manuel  Interpreting Physician:  Elyse Browne DO     SUMMARY     LEFT VENTRICLE:  Systolic function was mildly reduced by visual assessment  Ejection fraction was estimated in the range of 45 % to 50 %  There was mild diffuse hypokinesis with no identifiable regional variations  There was mild concentric hypertrophy    Doppler parameters were consistent with elevated mean left atrial filling pressure      VENTRICULAR SEPTUM:  There was paradoxical motion  These changes are consistent with right ventricular pacing      MITRAL VALVE:  There was mild regurgitation      AORTIC VALVE:  There was no evidence for stenosis  There was no regurgitation      TRICUSPID VALVE:  There was mild regurgitation  Pulmonary artery systolic pressure was within the normal range      HISTORY: PRIOR HISTORY: A  Flutter, Acute kidney injury, Hypothyroidism     PROCEDURE: The procedure was performed at the bedside  This was a routine study  The transthoracic approach was used  The study included complete 2D imaging, M-mode, complete spectral Doppler, and color Doppler  The heart rate was 70 bpm,  at the start of the study  Images were obtained from the parasternal, apical, subcostal, and suprasternal notch acoustic windows  Intravenous contrast (Definity solution (1 3 ml Definity/8 7ml normal saline solution), 2 ml) was  administered to evaluate intracardiac shunting and opacify the left ventricle  This was a technically difficult study      LEFT VENTRICLE: Size was normal  Systolic function was mildly reduced by visual assessment  Ejection fraction was estimated in the range of 45 % to 50 %  There was mild diffuse hypokinesis with no identifiable regional variations  There  was mild concentric hypertrophy  DOPPLER: Doppler parameters were consistent with elevated mean left atrial filling pressure      VENTRICULAR SEPTUM: There was paradoxical motion  These changes are consistent with right ventricular pacing      RIGHT VENTRICLE: The size was normal  Systolic function was normal  A pacing wire was present  The tip was at the RV apex  DOPPLER: Systolic pressure was within the normal range      LEFT ATRIUM: The atrium was mildly dilated      RIGHT ATRIUM: Size was normal      MITRAL VALVE: Valve structure was normal  There was normal leaflet separation   No echocardiographic evidence for prolapse  DOPPLER: The transmitral velocity was within the normal range  There was no evidence for stenosis  There was mild  regurgitation      AORTIC VALVE: The valve was trileaflet  Leaflets exhibited normal cuspal separation and sclerosis  DOPPLER: Transaortic velocity was within the normal range  There was no evidence for stenosis  There was no regurgitation      TRICUSPID VALVE: The valve structure was normal  There was normal leaflet separation  DOPPLER: The transtricuspid velocity was within the normal range  There was mild regurgitation  Pulmonary artery systolic pressure was within the normal  range  Estimated peak PA pressure was 31 mmHg      PULMONIC VALVE: Leaflets exhibited normal thickness, no calcification, and normal cuspal separation  DOPPLER: The transpulmonic velocity was within the normal range  There was mild regurgitation      PERICARDIUM: There was no thickening  There was no pericardial effusion      AORTA: The root exhibited normal size      PULMONARY ARTERY: The size was normal  The morphology appeared normal      SYSTEM MEASUREMENT TABLES     2D mode  AoR Diam 2D: 3 3 cm  LA Diam (2D): 4 2 cm  LA/Ao (2D): 1 27  FS (2D Teich): 25 3 %  IVSd (2D): 1 45 cm  LVDEV: 123 cm³  LVESV: 62 cm³  LVIDd(2D): 5 42 cm  LVISd (2D): 3 8 cm  LVOT Area 2D: 3 14 cm squared  LVPWd (2D): 1 35 cm  SV (Teich): 61 cm³     Apical four chamber  LVEF A4C: 51 %     Unspecified Scan Mode  MIKAYLA Cont Eq (Peak Martin): 2 46 cm squared  LVOT Diam : 2 cm  LVOT Vmax: 1230 mm/s  LVOT Vmax; Mean: 1230 mm/s  Peak Grad ; Mean: 6 mm(Hg)  MV Peak A Martin: 696 mm/s  MV Peak E Martin   Mean: 1170 mm/s  MVA (PHT): 4 23 cm squared  PHT: 48 ms  Max P mm(Hg)  V Max: 2320 mm/s  Vmax: 2190 mm/s  RA Area: 20 5 cm squared  RA Volume: 63 5 cm³  TAPSE: 2 2 cm     [employer] Accredited Echocardiography Laboratory     Prepared and electronically signed by  Eun Munroe DO  Signed 01-Sep-2017 20:40:36     Linked Documents     View Image  Imaging     Echo complete with contrast if indicated (Order #[id number]) on 8/30/2017 - Imaging Information   Result History     Echo complete with contrast if indicated (Order #[id number]) on 9/1/2017 - Order Result History Report  Signed by     Signed Date/Time  Phone Pager  ClaudialineSteven 9/01/2017 20:41 [phone]     Cardiomyopathy ([address] ),  Pulmonary       GI/Hepatic  Dysphagia,   GERD , Esophageal disease esophageal cancer,        Chronic kidney disease ,        Endo/Other  History of thyroid disease , hypothyroidism,   Obesity  super morbid obesity   GYN       Hematology  Anemia ,     Musculoskeletal    Arthritis     Neurology   Psychology   Anxiety, Depression ,              Physical Exam    Airway    Mallampati score: II  TM Distance: >3 FB  Neck ROM: limited     Dental       Cardiovascular  Rhythm: regular, Rate: normal,     Pulmonary  Breath sounds clear to auscultation,     Other Findings        Anesthesia Plan  ASA Score- 4     Anesthesia Type- IV sedation with anesthesia with ASA Monitors  Additional Monitors:   Airway Plan:         Plan Factors-    Induction- intravenous  Postoperative Plan- Plan for postoperative opioid use  Informed Consent- Anesthetic plan and risks discussed with patient

## 2018-02-05 NOTE — PROCEDURES
PICC Line Insertion  Date/Time: 2/5/2018 11:05 AM  Performed by: Wyatt Benjamin by: Camelia Ponce     Patient location:  IR  Other Assisting Provider: No    Universal protocol:     Procedure explained and questions answered to patient or proxy's satisfaction: yes      Relevant documents present and verified: yes      Test results available and properly labeled: yes      Imaging studies available: yes      Required blood products, implants, devices, and special equipment available: yes      Site/side marked: yes      Immediately prior to procedure, a time out was called: yes      Patient identity confirmed:  Verbally with patient and arm band  Pre-procedure details:     Hand hygiene: Hand hygiene performed prior to insertion      Sterile barrier technique: All elements of maximal sterile technique followed      Skin preparation:  2% chlorhexidine    Skin preparation agent: Skin preparation agent completely dried prior to procedure    Indications:     PICC line indications: chemotherapy    Anesthesia (see MAR for exact dosages): Anesthesia method:  Local infiltration    Local anesthetic:  Lidocaine 1% w/o epi  Procedure details:     Location:  [place]    Vessel type: vein      Laterality:  Right    Approach: percutaneous technique used      Patient position:  Flat    Procedural supplies:  Double lumen    Catheter size:  5 Fr    Ultrasound guidance: yes      Sterile ultrasound techniques: Sterile gel and sterile probe covers were used      Number of attempts:  1    Successful placement: yes      Total catheter length (cm):  45 cm  Post-procedure details:     Post-procedure:  Dressing applied and securement device placed    Assessment:  Blood return through all ports, free fluid flow and placement verified by x-ray    Post-procedure complications: none      Patient tolerance of procedure:   Tolerated well, no immediate complications

## 2018-02-05 NOTE — CONSULTS
Consultation -  [facility]  Carmen Caro 69 y o  male MRN: [id number]  Unit/Bed#: [address] [place] [address] Encounter: [id number]        Inpatient consult to gastroenterology  Consult performed by: Cooper Seth ordered by: Shelley Costa          Reason for Consult / Principal Problem:  Anemia    HPI: Carmen Caro is a 69y o  year old male with history of atrial fibrillation anticoagulated with Xarelto, as well as esophageal cancer diagnosed October 2017* who presented from the infusion center where he was due to receive 1st infusion of chemotherapy, when it was noted that his hemoglobin had gone down to 7 from 11 since December, and his creatinine had increased to 3 2 from 1 6  Per the chart, patient's son's ex girlfriend who cares for patient occasionally was stating that the patient had had decreased appetite lately and had not been eating or drinking as much  The patient says that his appetite has been rather poor and has been gradually declining over the last couple of months  He says he actually does not have any physical difficulty, discomfort or pain with swallowing at this point, keeping mostly to soft foods and liquids  Patient denies any bright red blood per rectum or melena  He thinks his last colonoscopy was done 3 or 4 years ago when he was living in [facility]  Otherwise he denies any abdominal pain, nausea or vomiting  * Patient was diagnosed with esophageal cancer after he was seen by our service in October 2017 with complaints of dysphagia over the previous month; EGD on 10/31/2017 revealed a nearly obstructing mass at 33 cm  An esophageal stent was placed 11/17/2017  Later, this was found to have migrated into the stomach, and this was extracted during EGD on 12/22/2017  Another esophageal stent was placed last month on 1/11/2018  REVIEW OF SYSTEMS:    CONSTITUTIONAL: Denies any fever, chills, or rigors   Good appetite, and no recent weight loss   HEENT: No earache or tinnitus  Denies hearing loss or visual disturbances  CARDIOVASCULAR: No chest pain or palpitations  RESPIRATORY: Denies any cough, hemoptysis, shortness of breath or dyspnea on exertion  GASTROINTESTINAL: As noted in the History of Present Illness  GENITOURINARY: No problems with urination  Denies any hematuria or dysuria  NEUROLOGIC: No dizziness or vertigo, denies headaches  MUSCULOSKELETAL: Denies any muscle or joint pain  SKIN: Denies skin rashes or itching  ENDOCRINE: Denies excessive thirst  Denies intolerance to heat or cold  PSYCHOSOCIAL: Denies depression or anxiety  Denies any recent memory loss  Historical Information   Past Medical History:   Diagnosis Date    Anxiety     Arthritis     Atrial fibrillation ([facility] [address] [facility]     h/o atrial fib/ flutter    Cardiac disease     Cardiomyopathy, ischemic     last assessed: 12/8/2015    Chronic kidney disease     last assessed: 12/8/2015    Disease of thyroid gland     Dysphagia     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    Esophageal cancer ([facility] [address] [facility]     Esophageal cancer ([facility] [address] [facility]     GERD (gastroesophageal reflux disease)     Hernia, abdominal     chronic umbilical    Hyperlipidemia     Hypertension      Past Surgical History:   Procedure Laterality Date    CARDIAC PACEMAKER PLACEMENT      pacemaker/defibrilator    CARDIAC PACEMAKER PLACEMENT Left     placed 25 years ago   [address] [place] [address]      10/22/2015-pulse generatory replacement, pacemaker defibrillator; 7/27/2004 with defibrillator    COLONOSCOPY      (fiberoptic) screening    ESOPHAGOGASTRODUODENOSCOPY N/A 10/31/2017    Procedure: ESOPHAGOGASTRODUODENOSCOPY (EGD); Surgeon: Olivia Hawkins MD;  Location: [facility];   Service: Gastroenterology    ESOPHAGOGASTRODUODENOSCOPY N/A 11/17/2017    Procedure: ESOPHAGOGASTRODUODENOSCOPY (EGD) WITH STENT;  Surgeon: Olivia Hawkins MD;  Location: [address] [place] [address];  Service: Gastroenterology   [facility] ESOPHAGOGASTRODUODENOSCOPY N/A 12/22/2017    Procedure: ESOPHAGOGASTRODUODENOSCOPY (EGD) with stent retrieval;  Surgeon: Trina Garcia MD;  Location: [address] [place] [address];  Service: Gastroenterology    ESOPHAGOGASTRODUODENOSCOPY N/A 1/11/2018    Procedure: ESOPHAGOGASTRODUODENOSCOPY (EGD) WITH STENT PLACEMENT;  Surgeon: Candice Gong MD;  Location: [facility];  Service: Gastroenterology    FRACTURE SURGERY      LEG SURGERY      TIBIA FRACTURE SURGERY      orif left leg 1986     Social History   History   Alcohol Use No     History   Drug Use No     History   Smoking Status    Former Smoker    Packs/day: 1 00    Years: 25 00    Types: Cigarettes    Quit date: 11/16/1997   Smokeless Tobacco    Never Used     Family History   Problem Relation Age of Onset    Cervical cancer Mother     Sudden death Mother      sudden cardiac death (SCD)       Meds/Allergies     Prescriptions Prior to Admission   Medication    amiodarone 200 mg tablet    aspirin (ASPIRIN LOW DOSE) 81 MG tablet    clindamycin (CLEOCIN) 300 MG capsule    furosemide (LASIX) 40 mg tablet    levothyroxine 25 mcg tablet    lisinopril (ZESTRIL) 5 mg tablet    metoprolol succinate (TOPROL-XL) 50 mg 24 hr tablet    Multiple Vitamin (MULTIVITAMIN) tablet    omeprazole (PriLOSEC) 40 MG capsule    pravastatin (PRAVACHOL) 80 mg tablet    promethazine (PHENERGAN) 12 5 mg/10 mL syrup    rivaroxaban (XARELTO) 20 mg tablet    sertraline (ZOLOFT) 50 mg tablet    sucralfate (CARAFATE) 1 g/10 mL suspension    traZODone (DESYREL) 150 mg tablet     Current Facility-Administered Medications   Medication Dose Route Frequency    ceFAZolin (ANCEF) IVPB (premix) 1,000 mg  1,000 mg Intravenous 30 min pre-procedure    heparin (porcine) subcutaneous injection 5,000 Units  5,000 Units Subcutaneous 30 min pre-procedure    sodium chloride 0 9 % infusion  125 mL/hr Intravenous Continuous    sodium chloride 0 9 % infusion  125 mL/hr Intravenous Continuous Facility-Administered Medications Ordered in Other Encounters   Medication Dose Route Frequency    fentaNYL (SUBLIMAZE) injection 25 mcg  25 mcg Intravenous PRN    ondansetron (ZOFRAN) injection 4 mg  4 mg Intravenous Once PRN    promethazine (PHENERGAN) injection 12 5 mg  12 5 mg Intravenous Once PRN       No Known Allergies        Objective     Blood pressure 155/69, pulse 79, temperature 97 6 °F (36 4 °C), temperature source Oral, resp  rate 16, height 5' 7" (1 702 m), weight 109 kg (240 lb), SpO2 95 %  No intake or output data in the 24 hours ending 02/05/18 1317      PHYSICAL EXAM     General Appearance:   Alert, cooperative, no distress, appears stated age    HEENT:   Normocephalic, atraumatic, anicteric      Neck:  Supple, symmetrical, trachea midline, no adenopathy;    thyroid: no enlargement/tenderness/nodules; no carotid  bruit or JVD    Lungs:   Clear to auscultation bilaterally; no rales, rhonchi or wheezing; respirations unlabored    Heart[de-identified]   S1 and S2 normal; regular rate and rhythm; no murmur, rub, or gallop     Abdomen:   Soft, non-tender, non-distended; normal bowel sounds; no masses, no organomegaly    Genitalia:   Deferred    Rectal:   Deferred    Extremities:  No cyanosis, clubbing or edema    Pulses:  2+ and symmetric all extremities    Skin:  Skin color, texture, turgor normal, no rashes or lesions    Lymph nodes:  No palpable cervical, axillary or inguinal lymphadenopathy        Lab Results:   Hospital Outpatient Visit on 02/05/2018   Component Date Value    WBC 02/05/2018 9 00     RBC 02/05/2018 2 84*    Hemoglobin 02/05/2018 7 9*    Hematocrit 02/05/2018 24 7*    MCV 02/05/2018 87     MCH 02/05/2018 27 9     MCHC 02/05/2018 32 1     RDW 02/05/2018 15 4*    MPV 02/05/2018 7 9*    Platelets 78/86/9748 325     nRBC 02/05/2018 0     Neutrophils Relative 02/05/2018 79*    Lymphocytes Relative 02/05/2018 14     Monocytes Relative 02/05/2018 4     Eosinophils Relative 02/05/2018 2     Basophils Relative 02/05/2018 0     Neutrophils Absolute 02/05/2018 7 10     Lymphocytes Absolute 02/05/2018 1 20     Monocytes Absolute 02/05/2018 0 40     Eosinophils Absolute 02/05/2018 0 20     Basophils Absolute 02/05/2018 0 00     Sodium 02/05/2018 136     Potassium 02/05/2018 3 5     Chloride 02/05/2018 97*    CO2 02/05/2018 30     Anion Gap 02/05/2018 9     BUN 02/05/2018 65*    Creatinine 02/05/2018 3 27*    Glucose 02/05/2018 116     Calcium 02/05/2018 9 4     AST 02/05/2018 21     ALT 02/05/2018 19     Alkaline Phosphatase 02/05/2018 54     Total Protein 02/05/2018 7 5     Albumin 02/05/2018 2 9*    Total Bilirubin 02/05/2018 0 30     eGFR 02/05/2018 17      Results for Heather Cueto (MRN [id number]) as of 2/5/2018 13:18   Ref   Range 12/22/2017 13:48 2/5/2018 09:33   eGFR Latest Units: ml/min/1 73sq m 38 17   Sodium Latest Ref Range: 136 - 145 mmol/L 141 136   Potassium Latest Ref Range: 3 5 - 5 3 mmol/L 4 2 3 5   Chloride Latest Ref Range: 100 - 108 mmol/L 104 97 (L)   CO2 Latest Ref Range: 21 - 32 mmol/L 26 30   Anion Gap Latest Ref Range: 4 - 13 mmol/L 11 9   BUN Latest Ref Range: 5 - 25 mg/dL 21 65 (H)   Creatinine Latest Ref Range: 0 60 - 1 30 mg/dL 1 72 (H) 3 27 (H)   Glucose Latest Ref Range: 65 - 140 mg/dL 98 116   Calcium Latest Ref Range: 8 3 - 10 1 mg/dL 8 8 9 4   AST Latest Ref Range: 5 - 45 U/L 16 21   ALT Latest Ref Range: 12 - 78 U/L 25 19   Alkaline Phosphatase Latest Ref Range: 46 - 116 U/L 55 54   Total Protein Latest Ref Range: 6 4 - 8 2 g/dL 8 0 7 5   Albumin Latest Ref Range: 3 5 - 5 0 g/dL 3 4 (L) 2 9 (L)   Total Bilirubin Latest Ref Range: 0 20 - 1 00 mg/dL 0 20 0 30   Lipase Latest Ref Range: 73 - 393 u/L 232    WBC Latest Ref Range: 4 80 - 10 80 Thousand/uL 6 20 9 00   RBC Latest Ref Range: 4 70 - 6 10 Million/uL 4 05 (L) 2 84 (L)   Hemoglobin Latest Ref Range: 14 0 - 18 0 g/dL 11 8 (L) 7 9 (L)   Hematocrit Latest Ref Range: 42 0 - 52 0 % 36 3 (L) 24 7 (L)   MCV Latest Ref Range: 82 - 98 fL 90 87   MCH Latest Ref Range: 27 0 - 31 0 pg 29 1 27 9   MCHC Latest Ref Range: 31 4 - 37 4 g/dL 32 5 32 1   RDW Latest Ref Range: 11 6 - 15 1 % 15 3 (H) 15 4 (H)   Platelets Latest Ref Range: 130 - 400 Thousands/uL 216 325   MPV Latest Ref Range: 8 9 - 12 7 fL 7 1 (L) 7 9 (L)   nRBC Latest Units: /100 WBCs 0 0   Neutrophils Relative Latest Ref Range: 43 - 75 % 66 79 (H)   Lymphocytes Relative Latest Ref Range: 14 - 44 % 22 14   Monocytes Relative Latest Ref Range: 4 - 12 % 6 4   Eosinophils Relative Latest Ref Range: 0 - 6 % 5 2   Basophils Relative Latest Ref Range: 0 - 1 % 1 0   Neutrophils Absolute Latest Ref Range: 1 85 - 7 62 Thousands/µL 4 10 7 10   Lymphocytes Absolute Latest Ref Range: 0 60 - 4 47 Thousands/µL 1 40 1 20   Monocytes Absolute Latest Ref Range: 0 17 - 1 22 Thousand/µL 0 40 0 40   Eosinophils Absolute Latest Ref Range: 0 00 - 0 61 Thousand/µL 0 30 0 20   Basophils Absolute Latest Ref Range: 0 00 - 0 10 Thousands/µL 0 00 0 00       Imaging Studies: I have personally reviewed pertinent reports  EGD, Dr Dalia Acevedo, 10/31/17  FINDINGS:  #1  Esophagus and GEJ- circumferential, nearly obstructing mass seen at 33 cm, scope was not able to be passed beyond this  Multiple biopsies were taken there was some residual medications that were lodged in and gently pushed through  #2  Stomach- not evaluated  #3   Duodenum- unable to evaluate  IMPRESSIONS:    Circumferential, nearly obstructing mass like lesion at 33 cm, multiple biopsies taken  RECOMMENDATIONS:   Discharge home  Full liquid diet  If cleared by Cardiology would recommend holding anticoagulation  Follow up biopsy results call the office in 1 week  Call with any abdominal pain, bleeding, fevers  Check a CT scan of his chest abdomen and pelvis      EGD, Dr Dalia Acevedo, 11/17/17  FINDINGS:  #1  Esophagus and GEJ- tight, nearly obstructing esophageal tumor at 34 centimeters very friable and ulcerated, the scope could not be advanced this  Using fluoroscopy and a guidewire a 9 to 12 extraction balloon was advanced beyond this, insufflated within the lumen of the stomach and pulled back to identify the GE junction  External radiopaque markers were placed  The balloon catheter and the scope were exchanged over the wire, a fully covered esophageal stent, 23 millimeters x 120 millimeters was advanced under fluoroscopic guidance  The stent was deployed  With good waist and positioning under fluoroscopy, the scope was reinserted to confirm position of the stent in the mid esophagus  #2  Stomach- not evaluated  #3  Duodenum- not evaluated   IMPRESSIONS:    Nearly obstructing esophageal tumor, adenocarcinoma at 34 centimeters approximately 4 centimeters in length, traversed with wire guidance and fluoroscopic guidance, a fully covered esophageal stent, 23 x 120 millimeters was advanced with good positioning  RECOMMENDATIONS:   Patient to be admitted to the floor for observation  IV fluids  Full liquid diet today  IV Phenergan 12 5 milligrams q 8 hours standing  IV Protonix 80 milligrams IV q 8 hours  Discharge home on oral medications  Pain medication as needed  Start soft diet tomorrow for the next 2 to 3 days and gradually advance to modified soft diet       EGD, Dr Chris Lauren, 12/22/17  FINDINGS:  #1  Esophagus- There was a malignant stricture in the distal esophagus  #2  Stomach- Stent was seen in the body of the stomach  A rat tooth forceps was used to grasp the string and pull it and the proximal part of the stent into the scope  Then the stent was removed  Relook showed a small amount of oozing but no gross bleeding  #3  Duodenum- Normal    IMPRESSIONS:    Esophageal stent removed with rat tooth forceps  RECOMMENDATIONS:   Resume soft diet  Continue Xarelto    If bleeding, pain, or fever, return to the hospital       EGD, Dr  Jayson Call, 1/11/18  FINDINGS:  #1  Esophagus and GEJ- friable ulcerated mass extending through the distal esophagus into the GE junction, very tight, using gentle pressure I was able to advance the scope beyond this  The stomach was unremarkable  Retroflexion demonstrated normal cardia  A hemoclip was placed at the GE junction to miguel the GE junction  A guidewire was advanced, over the guidewire a 23 mm x 120 mm fully covered [id number] esophageal stent was deployed, using fluoroscopic guidance with successful placement  Endoscope was readvanced into this esophagus to confirm positioning  #2  Stomach- normal  #3  Duodenum- normal    IMPRESSIONS:    Successful placement of a 23 mm x 120 mm fully covered [employer] stent through a nearly obstructing tumor in the distal esophagus which was quite friable  Using guidewire, fluoroscopic guidance stent was successfully placed in position was confirmed on fluoroscopy and direct visualization with endoscopy  RECOMMENDATIONS:   Discharge home  Clear liquid diet today  Twice daily PPI therapy, Phenergan as needed  Full liquid diet tomorrow  There after soft foods only such as ground meats, avoid all breads, no hard vegetables or fruits  Follow up with radiation oncologist       ASSESSMENT/PLAN:     1  Anemia with no gross signs of GI bleeding (FOBT actually noted to be negative) but notable drop in hemoglobin by about 4 g over a period of about 6 weeks  Suspect his anemia is largely related to #2 but rule out chronic occult loss from lower source, including malignancy    - I discussed this patient's case with resident Dr Abdulaziz Allison  - Monitor hemoglobin and transfuse if needed  - Protonix twice daily  - Would recommend colonoscopy to complete workup for anemia; requires clear liquid diet for day prior to procedure      2  Stage III esophageal cancer with esophageal stent placement last done about a month ago    Patient is yet to start chemotherapy    - Soft diet; avoid breads, hard vegetables or fruits  - oncology followup; consult noted      3  Anticoagulation with Xarelto in the setting of atrial fibrillation, Xarelto currently on hold due to #4     4   Right shoulder abscess, being planned for drainage by surgery    5  Ischemic cardiomyopathy with pacemaker placement    6  History of combined systolic and diastolic congestive heart failure      The patient was seen and examined by Dr Domenica Hurtado, all matthews medical decisions were made with Dr Domenica Hurtado  Thank you for allowing us to participate in the care of this pleasant patient  We will follow up with you closely

## 2018-02-06 ENCOUNTER — ANESTHESIA (OUTPATIENT)
Dept: PERIOP | Facility: HOSPITAL | Age: 78
DRG: 988 | End: 2018-02-06
Payer: COMMERCIAL

## 2018-02-06 ENCOUNTER — ANESTHESIA EVENT (INPATIENT)
Dept: GASTROENTEROLOGY | Facility: AMBULARY SURGERY CENTER | Age: 78
DRG: 988 | End: 2018-02-06
Payer: COMMERCIAL

## 2018-02-06 LAB
ABO GROUP BLD BPU: NORMAL
ABO GROUP BLD BPU: NORMAL
ANION GAP SERPL CALCULATED.3IONS-SCNC: 8 MMOL/L (ref 4–13)
BPU ID: NORMAL
BPU ID: NORMAL
BUN SERPL-MCNC: 54 MG/DL (ref 5–25)
CALCIUM SERPL-MCNC: 8.4 MG/DL (ref 8.3–10.1)
CHLORIDE SERPL-SCNC: 103 MMOL/L (ref 100–108)
CO2 SERPL-SCNC: 25 MMOL/L (ref 21–32)
CREAT SERPL-MCNC: 2.46 MG/DL (ref 0.6–1.3)
CROSSMATCH: NORMAL
CROSSMATCH: NORMAL
ERYTHROCYTE [DISTWIDTH] IN BLOOD BY AUTOMATED COUNT: 14.5 % (ref 11.6–15.1)
GFR SERPL CREATININE-BSD FRML MDRD: 25 ML/MIN/1.73SQ M
GLUCOSE P FAST SERPL-MCNC: 102 MG/DL (ref 65–99)
GLUCOSE SERPL-MCNC: 102 MG/DL (ref 65–140)
GLUCOSE SERPL-MCNC: 99 MG/DL (ref 65–140)
HCT VFR BLD AUTO: 28.5 % (ref 42–52)
HGB BLD-MCNC: 9.3 G/DL (ref 14–18)
MAGNESIUM SERPL-MCNC: 2 MG/DL (ref 1.6–2.6)
MCH RBC QN AUTO: 28.9 PG (ref 27–31)
MCHC RBC AUTO-ENTMCNC: 32.6 G/DL (ref 31.4–37.4)
MCV RBC AUTO: 89 FL (ref 82–98)
PHOSPHATE SERPL-MCNC: 3 MG/DL (ref 2.3–4.1)
PLATELET # BLD AUTO: 245 THOUSANDS/UL (ref 130–400)
PMV BLD AUTO: 8 FL (ref 8.9–12.7)
POTASSIUM SERPL-SCNC: 4.4 MMOL/L (ref 3.5–5.3)
RBC # BLD AUTO: 3.22 MILLION/UL (ref 4.7–6.1)
SODIUM SERPL-SCNC: 136 MMOL/L (ref 136–145)
UNIT DISPENSE STATUS: NORMAL
UNIT DISPENSE STATUS: NORMAL
UNIT PRODUCT CODE: NORMAL
UNIT PRODUCT CODE: NORMAL
UNIT RH: NORMAL
UNIT RH: NORMAL
WBC # BLD AUTO: 9.3 THOUSAND/UL (ref 4.8–10.8)

## 2018-02-06 PROCEDURE — P9021 RED BLOOD CELLS UNIT: HCPCS

## 2018-02-06 PROCEDURE — 84100 ASSAY OF PHOSPHORUS: CPT | Performed by: FAMILY MEDICINE

## 2018-02-06 PROCEDURE — 93005 ELECTROCARDIOGRAM TRACING: CPT

## 2018-02-06 PROCEDURE — 80048 BASIC METABOLIC PNL TOTAL CA: CPT | Performed by: FAMILY MEDICINE

## 2018-02-06 PROCEDURE — 87176 TISSUE HOMOGENIZATION CULTR: CPT | Performed by: SPECIALIST

## 2018-02-06 PROCEDURE — 87070 CULTURE OTHR SPECIMN AEROBIC: CPT | Performed by: SPECIALIST

## 2018-02-06 PROCEDURE — 87075 CULTR BACTERIA EXCEPT BLOOD: CPT | Performed by: SPECIALIST

## 2018-02-06 PROCEDURE — 0J970ZZ DRAINAGE OF BACK SUBCUTANEOUS TISSUE AND FASCIA, OPEN APPROACH: ICD-10-PCS | Performed by: SPECIALIST

## 2018-02-06 PROCEDURE — 99223 1ST HOSP IP/OBS HIGH 75: CPT | Performed by: INTERNAL MEDICINE

## 2018-02-06 PROCEDURE — 83735 ASSAY OF MAGNESIUM: CPT | Performed by: FAMILY MEDICINE

## 2018-02-06 PROCEDURE — 85027 COMPLETE CBC AUTOMATED: CPT | Performed by: STUDENT IN AN ORGANIZED HEALTH CARE EDUCATION/TRAINING PROGRAM

## 2018-02-06 PROCEDURE — 87205 SMEAR GRAM STAIN: CPT | Performed by: SPECIALIST

## 2018-02-06 PROCEDURE — 87185 SC STD ENZYME DETCJ PER NZM: CPT | Performed by: SPECIALIST

## 2018-02-06 PROCEDURE — 23030 I&D SHOULDER DEEP ABSC/HMTMA: CPT | Performed by: SPECIALIST

## 2018-02-06 PROCEDURE — 82948 REAGENT STRIP/BLOOD GLUCOSE: CPT

## 2018-02-06 PROCEDURE — 87147 CULTURE TYPE IMMUNOLOGIC: CPT | Performed by: SPECIALIST

## 2018-02-06 RX ORDER — OXYCODONE HYDROCHLORIDE AND ACETAMINOPHEN 5; 325 MG/1; MG/1
1 TABLET ORAL EVERY 4 HOURS PRN
Status: DISCONTINUED | OUTPATIENT
Start: 2018-02-06 | End: 2018-02-07 | Stop reason: HOSPADM

## 2018-02-06 RX ORDER — LIDOCAINE HYDROCHLORIDE 10 MG/ML
INJECTION, SOLUTION EPIDURAL; INFILTRATION; INTRACAUDAL; PERINEURAL AS NEEDED
Status: DISCONTINUED | OUTPATIENT
Start: 2018-02-06 | End: 2018-02-06 | Stop reason: HOSPADM

## 2018-02-06 RX ORDER — FENTANYL CITRATE 50 UG/ML
INJECTION, SOLUTION INTRAMUSCULAR; INTRAVENOUS AS NEEDED
Status: DISCONTINUED | OUTPATIENT
Start: 2018-02-06 | End: 2018-02-06 | Stop reason: SURG

## 2018-02-06 RX ORDER — PROPOFOL 10 MG/ML
INJECTION, EMULSION INTRAVENOUS AS NEEDED
Status: DISCONTINUED | OUTPATIENT
Start: 2018-02-06 | End: 2018-02-06 | Stop reason: SURG

## 2018-02-06 RX ORDER — SODIUM CHLORIDE 9 MG/ML
INJECTION, SOLUTION INTRAVENOUS CONTINUOUS PRN
Status: DISCONTINUED | OUTPATIENT
Start: 2018-02-06 | End: 2018-02-06 | Stop reason: SURG

## 2018-02-06 RX ORDER — PROPOFOL 10 MG/ML
INJECTION, EMULSION INTRAVENOUS CONTINUOUS PRN
Status: DISCONTINUED | OUTPATIENT
Start: 2018-02-06 | End: 2018-02-06 | Stop reason: SURG

## 2018-02-06 RX ORDER — MIDAZOLAM HYDROCHLORIDE 1 MG/ML
INJECTION INTRAMUSCULAR; INTRAVENOUS AS NEEDED
Status: DISCONTINUED | OUTPATIENT
Start: 2018-02-06 | End: 2018-02-06 | Stop reason: SURG

## 2018-02-06 RX ORDER — SODIUM CHLORIDE 9 MG/ML
50 INJECTION, SOLUTION INTRAVENOUS CONTINUOUS
Status: DISCONTINUED | OUTPATIENT
Start: 2018-02-06 | End: 2018-02-07 | Stop reason: HOSPADM

## 2018-02-06 RX ORDER — ONDANSETRON 2 MG/ML
INJECTION INTRAMUSCULAR; INTRAVENOUS AS NEEDED
Status: DISCONTINUED | OUTPATIENT
Start: 2018-02-06 | End: 2018-02-06 | Stop reason: SURG

## 2018-02-06 RX ADMIN — TRAZODONE HYDROCHLORIDE 150 MG: 50 TABLET ORAL at 22:05

## 2018-02-06 RX ADMIN — SODIUM CHLORIDE: 0.9 INJECTION, SOLUTION INTRAVENOUS at 10:46

## 2018-02-06 RX ADMIN — SUCRALFATE 1000 MG: 1 SUSPENSION ORAL at 17:31

## 2018-02-06 RX ADMIN — SODIUM CHLORIDE 50 ML/HR: 0.9 INJECTION, SOLUTION INTRAVENOUS at 22:23

## 2018-02-06 RX ADMIN — LEVOTHYROXINE SODIUM 25 MCG: 25 TABLET ORAL at 06:01

## 2018-02-06 RX ADMIN — CLINDAMYCIN HYDROCHLORIDE 300 MG: 150 CAPSULE ORAL at 22:05

## 2018-02-06 RX ADMIN — AMIODARONE HYDROCHLORIDE 200 MG: 200 TABLET ORAL at 18:12

## 2018-02-06 RX ADMIN — MIDAZOLAM HYDROCHLORIDE 2 MG: 1 INJECTION, SOLUTION INTRAMUSCULAR; INTRAVENOUS at 10:55

## 2018-02-06 RX ADMIN — PROMETHAZINE HYDROCHLORIDE 12.5 MG: 6.25 SOLUTION ORAL at 22:22

## 2018-02-06 RX ADMIN — FENTANYL CITRATE 50 MCG: 50 INJECTION, SOLUTION INTRAMUSCULAR; INTRAVENOUS at 11:00

## 2018-02-06 RX ADMIN — POLYETHYLENE GLYCOL 3350, SODIUM SULFATE ANHYDROUS, SODIUM BICARBONATE, SODIUM CHLORIDE, POTASSIUM CHLORIDE 4000 ML: 236; 22.74; 6.74; 5.86; 2.97 POWDER, FOR SOLUTION ORAL at 17:31

## 2018-02-06 RX ADMIN — PROPOFOL 90 MCG/KG/MIN: 10 INJECTION, EMULSION INTRAVENOUS at 10:57

## 2018-02-06 RX ADMIN — SUCRALFATE 1000 MG: 1 SUSPENSION ORAL at 22:05

## 2018-02-06 RX ADMIN — METOPROLOL SUCCINATE 50 MG: 50 TABLET, FILM COATED, EXTENDED RELEASE ORAL at 08:37

## 2018-02-06 RX ADMIN — PROMETHAZINE HYDROCHLORIDE 12.5 MG: 6.25 SOLUTION ORAL at 17:32

## 2018-02-06 RX ADMIN — SERTRALINE HYDROCHLORIDE 50 MG: 50 TABLET ORAL at 08:36

## 2018-02-06 RX ADMIN — ONDANSETRON 4 MG: 2 INJECTION INTRAMUSCULAR; INTRAVENOUS at 10:57

## 2018-02-06 RX ADMIN — CLINDAMYCIN HYDROCHLORIDE 300 MG: 150 CAPSULE ORAL at 17:32

## 2018-02-06 RX ADMIN — PRAVASTATIN SODIUM 80 MG: 80 TABLET ORAL at 17:32

## 2018-02-06 RX ADMIN — SUCRALFATE 1000 MG: 1 SUSPENSION ORAL at 06:01

## 2018-02-06 RX ADMIN — CLINDAMYCIN HYDROCHLORIDE 300 MG: 150 CAPSULE ORAL at 08:36

## 2018-02-06 RX ADMIN — PROMETHAZINE HYDROCHLORIDE 12.5 MG: 6.25 SOLUTION ORAL at 08:36

## 2018-02-06 RX ADMIN — CEFAZOLIN SODIUM 1000 MG: 1 SOLUTION INTRAVENOUS at 10:57

## 2018-02-06 RX ADMIN — AMIODARONE HYDROCHLORIDE 200 MG: 200 TABLET ORAL at 08:36

## 2018-02-06 RX ADMIN — PROPOFOL 40 MG: 10 INJECTION, EMULSION INTRAVENOUS at 10:57

## 2018-02-06 RX ADMIN — ASPIRIN 81 MG 81 MG: 81 TABLET ORAL at 08:36

## 2018-02-06 NOTE — ANESTHESIA POSTPROCEDURE EVALUATION
Post-Op Assessment Note      CV Status:  Stable    Mental Status:  Somnolent    Hydration Status:  Stable    PONV Controlled:  Controlled    Airway Patency:  Patent and adequate    Post Op Vitals Reviewed: Yes          Staff: CRNA           BP      Temp      Pulse     Resp      SpO2

## 2018-02-06 NOTE — PLAN OF CARE
DISCHARGE PLANNING     Discharge to home or other facility with appropriate resources Progressing        DISCHARGE PLANNING - CARE MANAGEMENT     Discharge to post-acute care or home with appropriate resources Progressing        HEMATOLOGIC - ADULT     Maintains hematologic stability Progressing        INFECTION - ADULT     Absence or prevention of progression during hospitalization Progressing     Absence of fever/infection during neutropenic period Progressing        Knowledge Deficit     Patient/family/caregiver demonstrates understanding of disease process, treatment plan, medications, and discharge instructions Progressing        Nutrition/Hydration-ADULT     Nutrient/Hydration intake appropriate for improving, restoring or maintaining nutritional needs Progressing        Potential for Falls     Patient will remain free of falls Progressing        SAFETY ADULT     Maintain or return to baseline ADL function Progressing     Maintain or return mobility status to optimal level Progressing        SKIN/TISSUE INTEGRITY - ADULT     Skin integrity remains intact Progressing     Incision(s), wounds(s) or drain site(s) healing without S/S of infection Progressing     Oral mucous membranes remain intact Progressing

## 2018-02-06 NOTE — PROGRESS NOTES
[address] [facility] 66 [address] 83 [facility] Practice Progress Note - Luna Pathak 69 y o  male MRN: [id number]    Unit/Bed#: OR ELDA Encounter: [id number]      Assessment/Plan:  New Onset Acute Normocytic Anemia: BL Hgb ~ 11 2  Hgb was 7 1 last night requiring 2U PRBC transfusion  Hgb this morning is stable at 9 3  Unsure as to cause at this time  FOBT done in the ED negative  Pt denies bleeding per rectum or hematemesis  H&H q6h  GI consulted and will do workup with colonoscopy on 2/7/18       LIVAN on CKD stage IV: BL Cr ~ 1 5  Cr improving from 3 27 on admission to 2 46 this morning  Larri Worcester Possibly 2/2 dehydration  Continue IVF with NS @ 125 cc/hr and monitor daily BMP  If Cr worsens, will consider nephrology consult, but at this time since it seems to be improving, will hold off       Esophageal Cancer: Pt had esophageal stent placed by Dr Karl Rosado (GI) on 1/11/18  He had seen Dr Olivier Burns (Heme-Onc) on 1/25/18 and it was decided that he would begin chemotherapy with Paclitaxel and Carboplatin yesterday, which he was unable to do due to abnormal lab work as above  Dr Olivier Burns consulted and chemotherapy will be on hold at this time until work up for above is complete       Recurrent Abscess/Cyst of Right Shoulder: Pt currently on Clindamycin (Day 6/7) 300mg 4x daily  Pt to OR today for previously scheduled I&D of abscess with surgeon, Dr Carl Phan       Hx of Seamus Brantleyer: stable, c/w Metoprolol and Amiodarone  Xarelto has been held for R shoulder abscess I&D and will be held further for colonoscopy and acute anemia  Will monitor pt on telemetry      Hx of Combined Systolic and Diastolic CHF: Last echo done on 8/31/17 showed EF of 45-50%  Pt does not appear to be overtly fluid over loaded at this time  Will hold home Lasix due to LIVAN from possible dehydration      Hx of Ischemic Cardiomyopathy with Pacemaker: stable  Pt follows outpatient with cardiologist Dr Chaudhry Coil  Continue ASA       Hypertension: stable, continue Metoprolol   Will hold Lisinopril due to LIVAN       Hyperlipidemia: stable, continue Pravastatin       Hypothyroidism: Last TSH done 11/25/17 was 7 178  Continue Levothyroxine       GERD: stable, c/w Carafate       Depression: C/w Sertraline and Trazodone       Global: Xarelto (currently held for scheduled I&D of shoulder abscess), monitor and replete electrolytes as needed, IVF, Cardiac diet  Subjective:   Pt seen and examined at bedside  Overnight was transfused 2U PRBC for drop in Hgb to 7 1  Pt states he feels well and has no complaints  Objective:     Vitals: Blood pressure 152/71, pulse 70, temperature 97 6 °F (36 4 °C), temperature source Oral, resp  rate 18, height 5' 7" (1 702 m), weight 111 kg (245 lb 3 2 oz), SpO2 98 %  ,Body mass index is 38 4 kg/m²  Wt Readings from Last 3 Encounters:   02/06/18 111 kg (245 lb 3 2 oz)   02/01/18 113 kg (248 lb 9 6 oz)   01/31/18 111 kg (245 lb 12 8 oz)       Intake/Output Summary (Last 24 hours) at 02/06/18 1042  Last data filed at 02/06/18 0736   Gross per 24 hour   Intake           728 33 ml   Output              450 ml   Net           278 33 ml       Physical Exam:   General: Pt is AAO x 3, not in any acute distress  Cardio: RRR, S1 and S2 +, no murmurs/rubs/gallops/clicks  Resp: CTA b/l, no wheezes/rales/rhonchi  Abdomen: soft, NT/ND, BS+  Extremities: No cyanosis  PP 2+ b/l  Right shoulder abscess, dressing present C/D/I  Minimal lower extremity edema bilaterally       Recent Results (from the past 24 hour(s))   Fingerstick Glucose (POCT)    Collection Time: 02/05/18  2:33 PM   Result Value Ref Range    POC Glucose 135 65 - 140 mg/dl   CBC (With Platelets)    Collection Time: 02/05/18  6:46 PM   Result Value Ref Range    WBC 7 00 4 80 - 10 80 Thousand/uL    RBC 2 56 (L) 4 70 - 6 10 Million/uL    Hemoglobin 7 1 (L) 14 0 - 18 0 g/dL    Hematocrit 21 8 (L) 42 0 - 52 0 %    MCV 85 82 - 98 fL    MCH 27 8 27 0 - 31 0 pg    MCHC 32 7 31 4 - 37 4 g/dL    RDW 14 2 11 6 - 15 1 %    Platelets 893 422 - 400 Thousands/uL    MPV 7 7 (L) 8 9 - 12 7 fL   Basic metabolic panel    Collection Time: 02/05/18  8:15 PM   Result Value Ref Range    Sodium 139 136 - 145 mmol/L    Potassium 3 1 (L) 3 5 - 5 3 mmol/L    Chloride 104 100 - 108 mmol/L    CO2 28 21 - 32 mmol/L    Anion Gap 7 4 - 13 mmol/L    BUN 54 (H) 5 - 25 mg/dL    Creatinine 2 52 (H) 0 60 - 1 30 mg/dL    Glucose 110 65 - 140 mg/dL    Calcium 7 5 (L) 8 3 - 10 1 mg/dL    eGFR 24 ml/min/1 73sq m   Type and screen    Collection Time: 02/05/18  8:55 PM   Result Value Ref Range    ABO Grouping A     Rh Factor Positive     Antibody Screen Negative     Specimen Expiration Date [id number]    Prepare RBC:Has consent been obtained?  Yes, 2 Units    Collection Time: 02/06/18  6:15 AM   Result Value Ref Range    Unit Product Code F3475X25     Unit Number [id number]     Unit ABO A     Unit [facility] POS     Crossmatch Compatible     Unit Dispense Status Presumed Trans     Unit Product Code A1793X66     Unit Number [id number]     Unit ABO A     Unit [facility] POS     Crossmatch Compatible     Unit Dispense Status Presumed Trans    Basic metabolic panel    Collection Time: 02/06/18  8:30 AM   Result Value Ref Range    Sodium 136 136 - 145 mmol/L    Potassium 4 4 3 5 - 5 3 mmol/L    Chloride 103 100 - 108 mmol/L    CO2 25 21 - 32 mmol/L    Anion Gap 8 4 - 13 mmol/L    BUN 54 (H) 5 - 25 mg/dL    Creatinine 2 46 (H) 0 60 - 1 30 mg/dL    Glucose 102 65 - 140 mg/dL    Glucose, Fasting 102 (H) 65 - 99 mg/dL    Calcium 8 4 8 3 - 10 1 mg/dL    eGFR 25 ml/min/1 73sq m   Magnesium    Collection Time: 02/06/18  8:30 AM   Result Value Ref Range    Magnesium 2 0 1 6 - 2 6 mg/dL   Phosphorus    Collection Time: 02/06/18  8:30 AM   Result Value Ref Range    Phosphorus 3 0 2 3 - 4 1 mg/dL   CBC (With Platelets)    Collection Time: 02/06/18  8:30 AM   Result Value Ref Range    WBC 9 30 4 80 - 10 80 Thousand/uL    RBC 3 22 (L) 4 70 - 6 10 Million/uL    Hemoglobin 9 3 (L) 14 0 - 18 0 g/dL    Hematocrit 28 5 (L) 42 0 - 52 0 %    MCV 89 82 - 98 fL    MCH 28 9 27 0 - 31 0 pg    MCHC 32 6 31 4 - 37 4 g/dL    RDW 14 5 11 6 - 15 1 %    Platelets 107 138 - 635 Thousands/uL    MPV 8 0 (L) 8 9 - 12 7 fL       Current Facility-Administered Medications   Medication Dose Route Frequency Provider Last Rate Last Dose    [MAR Hold] acetaminophen (TYLENOL) tablet 650 mg  650 mg Oral Q6H PRN Aftab Rossi MD   650 mg at 02/05/18 2329    [MAR Hold] amiodarone tablet 200 mg  200 mg Oral BID Aftab Rossi MD   200 mg at 02/06/18 0836    [MAR Hold] aspirin chewable tablet 81 mg  81 mg Oral Daily Aftab Rossi MD   81 mg at 02/06/18 0836    ceFAZolin (ANCEF) IVPB (premix) 1,000 mg  1,000 mg Intravenous 30 min pre-procedure Aftab Rossi MD        [facility] Hold] clindamycin (CLEOCIN) capsule 300 mg  300 mg Oral 4x Daily Aftab Rossi MD   300 mg at 02/06/18 0836    [MAR Hold] levothyroxine tablet 25 mcg  25 mcg Oral Daily Aftab Rossi MD   25 mcg at 02/06/18 0601    [MAR Hold] metoprolol succinate (TOPROL-XL) 24 hr tablet 50 mg  50 mg Oral Daily Aftab Rossi MD   50 mg at 02/06/18 0837    [MAR Hold] polyethylene glycol (GOLYTELY) bowel prep 4,000 mL  4,000 mL Oral Once Junior Osorio PA-C        [MAR Hold] pravastatin (PRAVACHOL) tablet 80 mg  80 mg Oral Daily With Reji Pinto MD   80 mg at 02/05/18 1925    [MAR Hold] promethazine (PHENERGAN) 12 5 mg/10 mL oral syrup 12 5 mg  12 5 mg Oral TID Aftab Rossi MD   12 5 mg at 02/06/18 0836    [MAR Hold] sertraline (ZOLOFT) tablet 50 mg  50 mg Oral Daily Aftab Rossi MD   50 mg at 02/06/18 0836    sodium chloride 0 9 % infusion  125 mL/hr Intravenous Continuous Tharon DO Suzan   Stopped at 02/05/18 2259    [MAR Hold] sucralfate (CARAFATE) oral suspension 1,000 mg  1,000 mg Oral 4x Daily (AC & HS) Aftab Rossi MD   1,000 mg at 02/06/18 0601    [MAR Hold] traZODone (DESYREL) tablet 150 mg  150 mg Oral HS Aftab Rossi MD   150 mg at 02/05/18 9551 Facility-Administered Medications Ordered in Other Encounters   Medication Dose Route Frequency Provider Last Rate Last Dose    fentaNYL (SUBLIMAZE) injection 25 mcg  25 mcg Intravenous PRN Babita Covarrubias MD        ondansetron [facility]) injection 4 mg  4 mg Intravenous Once PRN Babita Covarrubias MD        [facility]) [facility] 12 5 mg  12 5 mg Intravenous Once PRN Babita Covarrubias MD           Invasive Devices     Peripherally Inserted Central Catheter Line            PICC Line 02/05/18 Right 1 day                Lab, Imaging and other studies: I have personally reviewed pertinent reports  VTE Pharmacologic Prophylaxis: Xarelto currently held for anemia, right shoulder abscess I&D and colonoscopy     VTE Mechanical Prophylaxis: sequential compression device    Moira Pickard MD

## 2018-02-06 NOTE — CASE MANAGEMENT
Initial Clinical Review    PATIENT WAS OBSERVATION STATUS 2/5/18 CONVERTED TO INPATIENT ADMISSION 2/6/18 NEEDING PRBC TRANSFUSION AND SURGERY    Inpatient Admission (Order [id number])   Admission   Date: 2/6/2018 Department: [facility] Released By/Authorizing: June Stroud DO (auto-released)   Order Information     Order Name   INPATIENT ADMISSION [263]     The link below is only for use if the above order is  [facility]   Face to Face Certification Statement (for  [facility] Only)   Order History   Inpatient   Date/Time Action Taken User Additional Information   02/06/18 1145 Release June Stroud DO (auto-released) From Order: [id number]   02/06/18 1145 Complete June Stroud DO    Order Details     Frequency Duration Priority Order Class   Once 1  occurrence Routine Hospital Performed   Order Questions     Question Answer Comment   Admitting Physician [facility]    Level of Care Med Surg    Estimated length of stay More than 2 Midnights    Certification I certify that inpatient services are medically necessary for this patient for a duration of greater than two midnights  See H&P and MD Progress Notes for additional information about the patient's course of treatment            Orders Placed This Encounter   Procedures    Place in Observation (expected length of stay for this patient is less than two midnights)     Standing Status:   Standing     Number of Occurrences:   1     Order Specific Question:   Admitting Physician     Answer:   Jenna Arroyo     Order Specific Question:   Level of Care     Answer:   Med Surg [16]         ED: Date/Time/Mode of Arrival:   ED Arrival Information     Expected Arrival Acuity Means of Arrival Escorted By Service Admission Type    - 2/5/2018 10:29 Urgent Wheelchair Other General Medicine Urgent    Arrival Complaint    came from infustion, pic issue          Chief Complaint:   Chief Complaint Patient presents with    Abnormal Lab     Pt states he had a picc line inerted today - brought to ED by infusion tech , reason not given to registration - pt unsure as to why he was brought to the ED       History of Illness: Keri Fitzpatrick is a 70 yo M with PMH of esophageal cancer s/p esophageal stent placement, fib/flutter, chronic systolic and diastolic CHF, ischemic cardiomyopathy with pacemaker, hypertension, hyperlipidemia, hypothyroidism who presents today for work up of abnormal blood work  Pt has history of recently diagnosed esophageal cancer and follows Dr Ck Rich outpatient  Per Dr Garo Daniel note on 1/25/18, pt was going to begin chemotherapy with Paclitaxel and Carboplatin given during radiation  Today pt presented to the infusion center for placement of PICC line, blood work and initiation of chemo  Although after PICC line was placed and blood work was drawn, pt was noted to have Hgb of 7 9 which has decreased from last Hgb level done on 12/22/17 which was 11 8  Pt's baseline Hgb is ~11 2  A CMP also revealed that pt's Cr had gone up to 3 27 today when his last Cr on 12/22/17 was 1 72  Pt's baseline Cr is ~1 5  Pt is currently with his son's ex gf who cares for him occasionally who states that pt has had a decreased appetite lately and not been eating or drinking as much  He denies any other symptoms including shortness of breath, chest pain, bleeding per rectum, nausea/vomiting, fevers, chills, headaches, dizziness, blurry vision, fatigue       ED Vital Signs:   ED Triage Vitals   Temperature Pulse Respirations Blood Pressure SpO2   02/05/18 1048 02/05/18 1051 02/05/18 1051 02/05/18 1051 02/05/18 1051   (!) 96 4 °F (35 8 °C) 69 16 110/51 94 %      Temp Source Heart Rate Source Patient Position - Orthostatic VS BP Location FiO2 (%)   02/05/18 1048 02/05/18 1051 02/05/18 1051 02/05/18 1051 --   Tympanic Monitor Sitting Left arm       Pain Score       02/05/18 1051       No Pain        Wt Readings from Last 1 Encounters:   02/06/18 111 kg (245 lb 3 2 oz)       Vital Signs (abnormal): Abnormal Labs/Diagnostic Test Results: BUN/CR 54/2 46 GLUCOSE 102 , H/H 7 1/21 8  Impression: Uncomplicated placement of right arm, 5 [place], double lumen, 45 cm pressure injectable PICC via the basilic vein under ultrasound and fluoroscopy guidance  The PICC may be used immediately      ED Treatment:   Medication Administration from 02/05/2018 1029 to 02/05/2018 1243       Date/Time Order Dose Route Action Action by Comments     02/05/2018 1216 sodium chloride 0 9 % infusion 125 mL/hr Intravenous New Bag Tom Casas RN           Past Medical/Surgical History:    Active Ambulatory Problems     Diagnosis Date Noted    Atrial flutter with rapid ventricular response ([facility] ) 08/30/2017    Dysphagia 08/30/2017    Hypothyroidism 08/30/2017    Anxiety disorder 08/31/2017    Body mass index (BMI) of 45 0-49 9 in adult ([facility] ) 08/31/2017    Cardiomyopathy ([facility] ) 08/31/2017    Acute renal failure superimposed on stage 4 chronic kidney disease ([facility] ) 08/31/2017    Chronic coronary artery disease 08/31/2017    Mixed anxiety depressive disorder 08/31/2017    Gastroesophageal reflux disease 08/31/2017    Multiple-type hyperlipidemia 08/31/2017    Presence of cardiac pacemaker 08/31/2017    Esophageal cancer ([facility] ) 11/17/2017    Esophageal dysphagia 12/22/2017    Infected cyst of right shoulder 02/02/2018     Resolved Ambulatory Problems     Diagnosis Date Noted    LIVAN (acute kidney injury) ([facility] ) 08/30/2017    Chronic combined systolic and diastolic heart failure ([facility] ) 08/31/2017    History of esophagogastroduodenoscopy (EGD) 11/18/2017     Past Medical History:   Diagnosis Date    Anxiety     Arthritis     Atrial fibrillation [facility] )     Cardiac disease     Cardiomyopathy, ischemic     Chronic kidney disease     Disease of thyroid gland     Dysphagia     Esophageal cancer (HCC)     Esophageal cancer (HCC)     GERD (gastroesophageal reflux disease)     Hernia, abdominal     Hyperlipidemia     Hypertension        Admitting Diagnosis: Anemia [D64 9]  Abnormal laboratory test [R89 9]  Acute renal injury ([facility] ) [N17 9]    Age/Sex: 69 y o  male    Assessment/Plan:      Assessment/Plan:  Win Bird is a 68 yo M with PMH of esophageal cancer s/p esophageal stent placement, fib/flutter, chronic systolic and diastolic CHF, ischemic cardiomyopathy with pacemaker, hypertension, hyperlipidemia, hypothyroidism who presents today for work up of abnormal blood work  Pt is observation status under the service of Dr Edris Primrose for an estimated LOS < 2 days  New Onset Normocytic Anemia: Hgb 7 9 today from baseline of ~ 11 2  Unsure as to cause  FOBT done in the ED negative  Pt denies bleeding per rectum or hematemesis  Will monitor daily CBC  He is currently asymptomatic at this time so will not transfuse  Will repeat level 6 hours after initial level and if it continues to be low, or if pt becomes symptomatic, will give PRBC transfusion  Consult GI for further work up  H&H q6h  LIVAN on CKD stage IV: Possibly 2/2 dehydration  Pt's Cr level increased significantly to 3 27 from baseline of ~ 1 5  GFR today is 17  Will monitor daily BMP and start him on IVF NS @ 125 cc/hr  Esophageal Cancer: Pt had esophageal stent placed by Dr Devon Lui (GI) on 1/11/18  He had seen Dr Judd Bro (Heme-Onc) on 1/25/18 and it was decided that he would begin chemotherapy with Paclitaxel and Carboplatin today, which he was unable to do due to abnormal lab work as above  Will consult Dr Judd Bro  Recurrent Abscess/Cyst of Right Shoulder: Pt currently on Clindamycin (Day 5/7) 300mg 4x daily  Was drained previously one month ago, but recurred  Pt was referred to surgeon Dr Christopher Tanner who wants pt to have an I&D of the abscess in the OR due to large size with local anesthesia  It was scheduled for tomorrow  Pt had to be off of Xarelto for three days in order for this to be done  Spoke with Dr Rocio Reynoso who is ok with doing the procedure tomorrow while patient  Consult placed to Dr Rocio Reynoso  Hx of A  Fib/A  Flutter: stable, c/w Metoprolol and Amiodarone  Home Xarelto held for scheduled I&D of right shoulder abscess  Hx of Combined Systolic and Diastolic CHF: Last echo done on 8/31/17 showed EF of 45-50%  Pt does not appear to be overtly fluid over loaded at this time  Will hold home Lasix   Hx of Ischemic Cardiomyopathy with Pacemaker: stable  Pt follows outpatient with cardiologist Dr Deedee London  Continue ASA  Hypertension: stable, continue Metoprolol  Will hold Lisinopril due to LIVAN  Hyperlipidemia: stable, continue Pravastatin  Hypothyroidism: Last TSH done 11/25/17 was 7 178  Continue Levothyroxine  GERD: stable, c/w Carafate  Depression: C/w Sertraline and Trazodone  Global: Xarelto (currently held for scheduled I&D of shoulder abscess), monitor and replete electrolytes as needed, IVF, Cardiac diet  PER GI   ASSESSMENT/PLAN:   1  Anemia with no gross signs of GI bleeding (FOBT actually noted to be negative) but notable drop in hemoglobin by about 4 g over a period of about 6 weeks  Suspect his anemia is largely related to #2 but rule out chronic occult loss from lower source, including malignancy  - I discussed this patient's case with resident Dr Christopher Min  - Monitor hemoglobin and transfuse if needed  - Protonix twice daily  - Would recommend colonoscopy to complete workup for anemia; requires clear liquid diet for day prior to procedure  2  Stage III esophageal cancer with esophageal stent placement last done about a month ago  Patient is yet to start chemotherapy  - Soft diet; avoid breads, hard vegetables or fruits  - oncology followup; consult noted  3  Anticoagulation with Xarelto in the setting of atrial fibrillation, Xarelto currently on hold due to #4   4   Right shoulder abscess, being planned for drainage by surgery  5    Ischemic cardiomyopathy with pacemaker placement  6  History of combined systolic and diastolic congestive heart failure       2/16/18  Assessment/Plan:  New Onset Acute Normocytic Anemia: BL Hgb ~ 11 2  Hgb was 7 1 last night requiring 2U PRBC transfusion  Hgb this morning is stable at 9 3  Unsure as to cause at this time  FOBT done in the ED negative  Pt denies bleeding per rectum or hematemesis  H&H q6h  GI consulted and will do workup with colonoscopy on 2/7/18  LIVAN on CKD stage IV: BL Cr ~ 1 5  Cr improving from 3 27 on admission to 2 46 this morning  Marleta Press Possibly 2/2 dehydration  Continue IVF with NS @ 125 cc/hr and monitor daily BMP  If Cr worsens, will consider nephrology consult, but at this time since it seems to be improving, will hold off  Esophageal Cancer: Pt had esophageal stent placed by Dr Roderick Eli (GI) on 1/11/18  He had seen Dr Kvng Serra (Heme-Onc) on 1/25/18 and it was decided that he would begin chemotherapy with Paclitaxel and Carboplatin yesterday, which he was unable to do due to abnormal lab work as above  Dr Kvng Serra consulted and chemotherapy will be on hold at this time until work up for above is complete  Recurrent Abscess/Cyst of Right Shoulder: Pt currently on Clindamycin (Day 6/7) 300mg 4x daily  Pt to OR today for previously scheduled I&D of abscess with surgeon, Dr Marilee Fu  Hx of A  Fib/A  Flutter: stable, c/w Metoprolol and Amiodarone  Xarelto has been held for R shoulder abscess I&D and will be held further for colonoscopy and acute anemia  Will monitor pt on telemetry  Hx of Combined Systolic and Diastolic CHF: Last echo done on 8/31/17 showed EF of 45-50%  Pt does not appear to be overtly fluid over loaded at this time  Will hold home Lasix due to LIVAN from possible dehydration  Hx of Ischemic Cardiomyopathy with Pacemaker: stable  Pt follows outpatient with cardiologist Dr Tere Vega  Continue ASA  Hypertension: stable, continue Metoprolol  Will hold Lisinopril due to LIVAN  Hyperlipidemia: stable, continue Pravastatin  Hypothyroidism: Last TSH done 17 was 7 178  Continue Levothyroxine  GERD: stable, c/w Carafate  Depression: C/w Sertraline and Trazodone  Global: Xarelto (currently held for scheduled I&D of shoulder abscess), monitor and replete electrolytes as needed, IVF, Cardiac diet        OR   SHOULDER Location: [address] [facility]          []Hide copied text  []Hover for attribution information  OPERATIVE REPORT  PATIENT NAME: Freddy Sanchez    :  1940  MRN: [id number]  Pt Location: [facility] OR ROOM 03     SURGERY DATE: 2018     Surgeon(s) and Role:     * Johana Torre MD - Primary     Preop Diagnosis:  Infected cyst of right shoulder [M85 611]     Post-Op Diagnosis Codes:     * Infected cyst of right shoulder [M85 611]     Procedure(s) (LRB):  INCISION AND DRAINAGE (I&D), RIGHT SHOULDER (Right)     Specimen(s):  ID Type Source Tests Collected by Time Destination   A : culture abcess right shoulder Tissue Abscess ANAEROBIC CULTURE AND GRAM STAIN, CULTURE, TISSUE AND GRAM STAIN Johana Torre MD 2018 11:18 AM           Estimated Blood Loss:   Minimal     Drains:        Anesthesia Type:   IV Sedation with Anesthesia     Operative Indications:  Infected cyst of right shoulder [M85 611]        Operative Findings:  Abscess right shoulder deep measuring 6 x 3 cm     Complications:   None     Procedure and Technique:  The patient was taken to the operating room at [facility] on this day where a time-out was called identifying Freddy Sanchez for incision and drainage of an abscess of the right shoulder  This had been drained closed in the still draining and infected  The pre excision margin was 6 x 3 cm  The time-out included the patient's name, date of birth, procedure to be done, site of procedure, area marked, prepped and a dry, allergies none, anesthesia risk of four, beta-blocker taken, heparin given, Ancef given  Anesthesia risk of four and fire risk of 2-3    Once areas prepped and draped in normal fashion and adequate sedation was obtained 1% lidocaine was infiltrated  Incision was made carried through the skin and subcutaneous tissue  Loculations were broken  This went deep down to the muscle tissue  Area back in anaerobic cultures were performed  The area was extensively irrigated with saline solution  And packed with 1/2 inch iodoform packing  Sterile dressings were applied sponge and count were correct  Further local anesthesia was given at this time  I was present for the entire procedure     Patient Disposition:  PACU      SIGNATURE: Cj Rogers MD  DATE: February 6, 2018  TIME: 11:20 AM        PER GI  Acute on chronic anemia-patient's history of esophageal carcinoma status post Wallstent placement  Consider colonoscopy prior to starting chemotherapy    Bowel prep today for colonoscopy tomorrow  HGB AFTER TRANSFUSION 2/6/18  9 3/28 5  Admission Orders:  INPATIENT ADMISSION 2/6/18  TYPE SCREEN TRANSUFSE   TRANSFUSE 2U PRBC   TELE MON  CONSULT ONCOLOGY  CONSULT GENERAL SURGERY  DAILY WEIGHT I/O  NPO  Scheduled Meds:   Current Facility-Administered Medications:  acetaminophen 650 mg Oral Q6H PRN Anita Rahman MD    amiodarone 200 mg Oral BID Anita Rahman MD    aspirin 81 mg Oral Daily Anita Rahman MD    cefazolin 1,000 mg Intravenous 30 min pre-procedure Anita Rahman MD    clindamycin 300 mg Oral 4x Daily Anita Rahman MD    heparin (porcine) 5,000 Units Subcutaneous 30 min pre-procedure Anita Rahman MD    levothyroxine 25 mcg Oral Daily Anita Rahman MD    metoprolol succinate 50 mg Oral Daily Anita Rahman MD    polyethylene glycol 4,000 mL Oral Once Adithya Eastman PA-C    pravastatin 80 mg Oral Daily With Anup Toledo MD    promethazine 12 5 mg Oral TID Anita Rahman MD    sertraline 50 mg Oral Daily Anita Rahman MD    sodium chloride 125 mL/hr Intravenous Continuous Kelle Coles DO Last Rate: Stopped (02/05/18 1519)   sucralfate 1,000 mg Oral 4x Daily (AC & HS) Kaylee Vance Almanzar MD    traZODone 150 mg Oral HS Steffi Jarrett MD      Facility-Administered Medications Ordered in Other Encounters:  fentaNYL 25 mcg Intravenous PRN Edward Nino MD   ondansetron 4 mg Intravenous Once PRN Edward Nino MD   promethazine 12 5 mg Intravenous Once PRN Edward Nino MD     Continuous Infusions:   sodium chloride 125 mL/hr Last Rate: Stopped (02/05/18 7068)     PRN Meds:   acetaminophen

## 2018-02-06 NOTE — PROGRESS NOTES
Positive     Antibody Screen 02/05/2018 Negative     Specimen Expiration Date 02/05/2018 [id number]     Unit Product Code 02/06/2018 K0741V04     Unit Number 02/06/2018 [id number]     Unit ABO 02/06/2018 A     Unit RH 02/06/2018 POS     Crossmatch 02/06/2018 Compatible     Unit Dispense Status 02/06/2018 Presumed Trans     Unit Product Code 02/06/2018 A2025S95     Unit Number 02/06/2018 [id number]     Unit ABO 02/06/2018 A     Unit RH 02/06/2018 POS     Crossmatch 02/06/2018 Compatible     Unit Dispense Status 02/06/2018 Presumed Trans     ABO Grouping 02/05/2018 A     Rh Factor 02/05/2018 Positive     Sodium 02/06/2018 136     Potassium 02/06/2018 4 4     Chloride 02/06/2018 103     CO2 02/06/2018 25     Anion Gap 02/06/2018 8     BUN 02/06/2018 54*    Creatinine 02/06/2018 2 46*    Glucose 02/06/2018 102     Glucose, Fasting 02/06/2018 102*    Calcium 02/06/2018 8 4     eGFR 02/06/2018 25     Magnesium 02/06/2018 2 0     Phosphorus 02/06/2018 3 0     WBC 02/06/2018 9 30     RBC 02/06/2018 3 22*    Hemoglobin 02/06/2018 9 3*    Hematocrit 02/06/2018 28 5*    MCV 02/06/2018 89     MCH 02/06/2018 28 9     MCHC 02/06/2018 32 6     RDW 02/06/2018 14 5     Platelets 13/57/7350 245     MPV 02/06/2018 8 0*     Results for [facility] (MRN [id number]) as of 2/6/2018 10:59   Ref   Range 2/5/2018 18:46 2/5/2018 20:15 2/5/2018 20:55 2/6/2018 06:15 2/6/2018 06:15 2/6/2018 08:30   eGFR Latest Units: ml/min/1 73sq m  24    25   Sodium Latest Ref Range: 136 - 145 mmol/L  139    136   Potassium Latest Ref Range: 3 5 - 5 3 mmol/L  3 1 (L)    4 4   Chloride Latest Ref Range: 100 - 108 mmol/L  104    103   CO2 Latest Ref Range: 21 - 32 mmol/L  28    25   Anion Gap Latest Ref Range: 4 - 13 mmol/L  7    8   BUN Latest Ref Range: 5 - 25 mg/dL  54 (H)    54 (H)   Creatinine Latest Ref Range: 0 60 - 1 30 mg/dL  2 52 (H)    2 46 (H)   Glucose Latest Ref Range: 65 - 140 mg/dL  110    102 GLUCOSE FASTING Latest Ref Range: 65 - 99 mg/dL      102 (H)   Calcium Latest Ref Range: 8 3 - 10 1 mg/dL  7 5 (L)    8 4   Phosphorus Latest Ref Range: 2 3 - 4 1 mg/dL      3 0   Magnesium Latest Ref Range: 1 6 - 2 6 mg/dL      2 0   WBC Latest Ref Range: 4 80 - 10 80 Thousand/uL 7 00     9 30   RBC Latest Ref Range: 4 70 - 6 10 Million/uL 2 56 (L)     3 22 (L)   Hemoglobin Latest Ref Range: 14 0 - 18 0 g/dL 7 1 (L)     9 3 (L)   Hematocrit Latest Ref Range: 42 0 - 52 0 % 21 8 (L)     28 5 (L)   MCV Latest Ref Range: 82 - 98 fL 85     89   MCH Latest Ref Range: 27 0 - 31 0 pg 27 8     28 9   MCHC Latest Ref Range: 31 4 - 37 4 g/dL 32 7     32 6   RDW Latest Ref Range: 11 6 - 15 1 % 14 2     14 5   Platelets Latest Ref Range: 130 - 400 Thousands/uL 324     245   MPV Latest Ref Range: 8 9 - 12 7 fL 7 7 (L)     8 0 (L)   ABO Grouping Unknown   A      Rh Factor Unknown   Positive      Antibody Screen Unknown   Negative      Specimen Expiration Date Unknown   [id number]      Crossmatch Unknown    Compatible Compatible    Unit ABO Unknown    A A    Unit [facility] Unknown    POS POS    Unit Number Unknown    [id number]    Unit Dispense Status Unknown    Presumed Trans Presumed Trans        Assessment/Plan:    1  Acute on chronic anemia, no aissatou gastrointestinal bleeding demonstrated but rule out chronic occult blood loss from lower source, including any possible malignancy  Suspect his anemia is largely related to his known esophageal cancer however  - clear liquid diet after patient's I and D for shoulder abscess today  - prep in the afternoon for colonoscopy tomorrow  - I explained the patient again about the procedure including risks and benefits and he expresses agreement at this time  - monitor hemoglobin closely, transfuse further if needed, his hemoglobin currently appears appropriately improved after transfusion of 2 units packed red blood cells      2    Esophageal cancer, status post esophageal stent placement 01/11/2018    He was planned to start chemotherapy but this was held due to his acute on chronic anemia as well as acute on chronic kidney disease

## 2018-02-06 NOTE — OP NOTE
OPERATIVE REPORT  PATIENT NAME: Beata Schumacher    :  1940  MRN: [id number]  Pt Location: [facility] OR ROOM 03    SURGERY DATE: 2018    Surgeon(s) and Role:     * Hayley Spears MD - Primary    Preop Diagnosis:  Infected cyst of right shoulder [M85 611]    Post-Op Diagnosis Codes:     * Infected cyst of right shoulder [M85 611]    Procedure(s) (LRB):  INCISION AND DRAINAGE (I&D), RIGHT SHOULDER (Right)    Specimen(s):  ID Type Source Tests Collected by Time Destination   A : culture abcess right shoulder Tissue Abscess ANAEROBIC CULTURE AND GRAM STAIN, CULTURE, TISSUE AND GRAM STAIN Hayley Spears MD 2018 11:18 AM        Estimated Blood Loss:   Minimal    Drains:       Anesthesia Type:   IV Sedation with Anesthesia    Operative Indications:  Infected cyst of right shoulder [M85 611]      Operative Findings:  Abscess right shoulder deep measuring 6 x 3 cm    Complications:   None    Procedure and Technique:  The patient was taken to the operating room at [facility] on this day where a time-out was called identifying Beata Schumacher for incision and drainage of an abscess of the right shoulder  This had been drained closed in the still draining and infected  The pre excision margin was 6 x 3 cm  The time-out included the patient's name, date of birth, procedure to be done, site of procedure, area marked, prepped and a dry, allergies none, anesthesia risk of four, beta-blocker taken, heparin given, Ancef given  Anesthesia risk of four and fire risk of 2-3  Once areas prepped and draped in normal fashion and adequate sedation was obtained 1% lidocaine was infiltrated  Incision was made carried through the skin and subcutaneous tissue  Loculations were broken  This went deep down to the muscle tissue  Area back in anaerobic cultures were performed  The area was extensively irrigated with saline solution  And packed with 1/2 inch iodoform packing    Sterile dressings were applied sponge and count were correct  Further local anesthesia was given at this time     I was present for the entire procedure    Patient Disposition:  PACU     SIGNATURE: Enzo Rosales MD  DATE: February 6, 2018  TIME: 11:20 AM

## 2018-02-06 NOTE — SOCIAL WORK
Met with pt  Resides either with his friend Mello or son  Uses no dme  No c  Has been independent, but does not drive  Mello assists with transport  Explained role of cm, no anticipated d/c needs  CM reviewed d/c planning process including the following: identifying help at home, patient preference for d/c planning needs, and availability of the treatment team to discuss questions or concerns patient and/or family may have regarding understanding of medications and recognizing signs and symptoms once discharged  CM also encouraged patient to follow up with all recommended appointments after discharge  Patient advised of importance for patient and family to participate in managing patient's medical well being

## 2018-02-07 ENCOUNTER — ANESTHESIA (INPATIENT)
Dept: GASTROENTEROLOGY | Facility: AMBULARY SURGERY CENTER | Age: 78
DRG: 988 | End: 2018-02-07
Payer: COMMERCIAL

## 2018-02-07 VITALS
TEMPERATURE: 98 F | OXYGEN SATURATION: 95 % | WEIGHT: 245.2 LBS | HEIGHT: 67 IN | BODY MASS INDEX: 38.48 KG/M2 | RESPIRATION RATE: 18 BRPM | SYSTOLIC BLOOD PRESSURE: 136 MMHG | HEART RATE: 74 BPM | DIASTOLIC BLOOD PRESSURE: 63 MMHG

## 2018-02-07 LAB
ALBUMIN SERPL BCP-MCNC: 2.9 G/DL (ref 3.5–5)
ALP SERPL-CCNC: 56 U/L (ref 46–116)
ALT SERPL W P-5'-P-CCNC: 15 U/L (ref 12–78)
ANION GAP SERPL CALCULATED.3IONS-SCNC: 9 MMOL/L (ref 4–13)
AST SERPL W P-5'-P-CCNC: 20 U/L (ref 5–45)
ATRIAL RATE: 70 BPM
BILIRUB SERPL-MCNC: 0.3 MG/DL (ref 0.2–1)
BUN SERPL-MCNC: 38 MG/DL (ref 5–25)
CALCIUM SERPL-MCNC: 8.3 MG/DL (ref 8.3–10.1)
CHLORIDE SERPL-SCNC: 104 MMOL/L (ref 100–108)
CO2 SERPL-SCNC: 26 MMOL/L (ref 21–32)
CREAT SERPL-MCNC: 1.96 MG/DL (ref 0.6–1.3)
ERYTHROCYTE [DISTWIDTH] IN BLOOD BY AUTOMATED COUNT: 14.8 % (ref 11.6–15.1)
GFR SERPL CREATININE-BSD FRML MDRD: 32 ML/MIN/1.73SQ M
GLUCOSE SERPL-MCNC: 101 MG/DL (ref 65–140)
GLUCOSE SERPL-MCNC: 96 MG/DL (ref 65–140)
HCT VFR BLD AUTO: 31.3 % (ref 42–52)
HGB BLD-MCNC: 10.3 G/DL (ref 14–18)
MAGNESIUM SERPL-MCNC: 1.9 MG/DL (ref 1.6–2.6)
MCH RBC QN AUTO: 29.1 PG (ref 27–31)
MCHC RBC AUTO-ENTMCNC: 32.8 G/DL (ref 31.4–37.4)
MCV RBC AUTO: 89 FL (ref 82–98)
MRSA NOSE QL CULT: NORMAL
P AXIS: 57 DEGREES
PHOSPHATE SERPL-MCNC: 2.2 MG/DL (ref 2.3–4.1)
PLATELET # BLD AUTO: 273 THOUSANDS/UL (ref 130–400)
PMV BLD AUTO: 7.9 FL (ref 8.9–12.7)
POTASSIUM SERPL-SCNC: 4.1 MMOL/L (ref 3.5–5.3)
PR INTERVAL: 176 MS
PROT SERPL-MCNC: 7.6 G/DL (ref 6.4–8.2)
QRS AXIS: -81 DEGREES
QRSD INTERVAL: 188 MS
QT INTERVAL: 538 MS
QTC INTERVAL: 581 MS
RBC # BLD AUTO: 3.54 MILLION/UL (ref 4.7–6.1)
SODIUM SERPL-SCNC: 139 MMOL/L (ref 136–145)
T WAVE AXIS: 94 DEGREES
VENTRICULAR RATE: 70 BPM
WBC # BLD AUTO: 7.8 THOUSAND/UL (ref 4.8–10.8)

## 2018-02-07 PROCEDURE — 82948 REAGENT STRIP/BLOOD GLUCOSE: CPT

## 2018-02-07 PROCEDURE — 99233 SBSQ HOSP IP/OBS HIGH 50: CPT | Performed by: INTERNAL MEDICINE

## 2018-02-07 PROCEDURE — 0DJD8ZZ INSPECTION OF LOWER INTESTINAL TRACT, VIA NATURAL OR ARTIFICIAL OPENING ENDOSCOPIC: ICD-10-PCS | Performed by: EMERGENCY MEDICINE

## 2018-02-07 PROCEDURE — 80053 COMPREHEN METABOLIC PANEL: CPT | Performed by: STUDENT IN AN ORGANIZED HEALTH CARE EDUCATION/TRAINING PROGRAM

## 2018-02-07 PROCEDURE — 45378 DIAGNOSTIC COLONOSCOPY: CPT | Performed by: INTERNAL MEDICINE

## 2018-02-07 PROCEDURE — 93010 ELECTROCARDIOGRAM REPORT: CPT | Performed by: INTERNAL MEDICINE

## 2018-02-07 PROCEDURE — 99239 HOSP IP/OBS DSCHRG MGMT >30: CPT | Performed by: FAMILY MEDICINE

## 2018-02-07 PROCEDURE — 84100 ASSAY OF PHOSPHORUS: CPT | Performed by: STUDENT IN AN ORGANIZED HEALTH CARE EDUCATION/TRAINING PROGRAM

## 2018-02-07 PROCEDURE — 83735 ASSAY OF MAGNESIUM: CPT | Performed by: STUDENT IN AN ORGANIZED HEALTH CARE EDUCATION/TRAINING PROGRAM

## 2018-02-07 PROCEDURE — 85027 COMPLETE CBC AUTOMATED: CPT | Performed by: STUDENT IN AN ORGANIZED HEALTH CARE EDUCATION/TRAINING PROGRAM

## 2018-02-07 RX ORDER — SODIUM CHLORIDE 9 MG/ML
125 INJECTION, SOLUTION INTRAVENOUS CONTINUOUS
Status: DISCONTINUED | OUTPATIENT
Start: 2018-02-07 | End: 2018-02-07 | Stop reason: HOSPADM

## 2018-02-07 RX ORDER — PROPOFOL 10 MG/ML
INJECTION, EMULSION INTRAVENOUS CONTINUOUS PRN
Status: DISCONTINUED | OUTPATIENT
Start: 2018-02-07 | End: 2018-02-07 | Stop reason: SURG

## 2018-02-07 RX ORDER — PROPOFOL 10 MG/ML
INJECTION, EMULSION INTRAVENOUS AS NEEDED
Status: DISCONTINUED | OUTPATIENT
Start: 2018-02-07 | End: 2018-02-07 | Stop reason: SURG

## 2018-02-07 RX ADMIN — AMIODARONE HYDROCHLORIDE 200 MG: 200 TABLET ORAL at 08:39

## 2018-02-07 RX ADMIN — AMIODARONE HYDROCHLORIDE 200 MG: 200 TABLET ORAL at 17:24

## 2018-02-07 RX ADMIN — METOPROLOL SUCCINATE 50 MG: 50 TABLET, FILM COATED, EXTENDED RELEASE ORAL at 08:38

## 2018-02-07 RX ADMIN — SERTRALINE HYDROCHLORIDE 50 MG: 50 TABLET ORAL at 08:39

## 2018-02-07 RX ADMIN — PROMETHAZINE HYDROCHLORIDE 12.5 MG: 6.25 SOLUTION ORAL at 17:24

## 2018-02-07 RX ADMIN — ASPIRIN 81 MG 81 MG: 81 TABLET ORAL at 08:39

## 2018-02-07 RX ADMIN — SUCRALFATE 1000 MG: 1 SUSPENSION ORAL at 17:24

## 2018-02-07 RX ADMIN — PROPOFOL 100 MCG/KG/MIN: 10 INJECTION, EMULSION INTRAVENOUS at 13:26

## 2018-02-07 RX ADMIN — SODIUM CHLORIDE: 0.9 INJECTION, SOLUTION INTRAVENOUS at 13:20

## 2018-02-07 RX ADMIN — PROPOFOL 50 MG: 10 INJECTION, EMULSION INTRAVENOUS at 13:26

## 2018-02-07 RX ADMIN — SODIUM CHLORIDE 125 ML/HR: 0.9 INJECTION, SOLUTION INTRAVENOUS at 08:49

## 2018-02-07 RX ADMIN — CLINDAMYCIN HYDROCHLORIDE 300 MG: 150 CAPSULE ORAL at 08:47

## 2018-02-07 RX ADMIN — PROPOFOL 25 MG: 10 INJECTION, EMULSION INTRAVENOUS at 13:31

## 2018-02-07 RX ADMIN — PRAVASTATIN SODIUM 80 MG: 80 TABLET ORAL at 17:24

## 2018-02-07 RX ADMIN — LEVOTHYROXINE SODIUM 25 MCG: 25 TABLET ORAL at 05:29

## 2018-02-07 RX ADMIN — RIVAROXABAN 20 MG: 10 TABLET, FILM COATED ORAL at 17:40

## 2018-02-07 RX ADMIN — SODIUM CHLORIDE 125 ML/HR: 0.9 INJECTION, SOLUTION INTRAVENOUS at 12:25

## 2018-02-07 NOTE — ANESTHESIA PREPROCEDURE EVALUATION
Atrial flutter with rapid ventricular response (HCC)   Dysphagia   Hypothyroidism   Anxiety disorder   Body mass index (BMI) of 45 0-49 9 in adult ([facility] )   Cardiomyopathy ([facility] )   Acute renal failure superimposed on stage 4 chronic kidney disease (HCC)   Chronic coronary artery disease   Mixed anxiety depressive disorder   Gastroesophageal reflux disease   Multiple-type hyperlipidemia   Presence of cardiac pacemaker   Esophageal cancer (HCC)   Esophageal dysphagia   Infected cyst of right shoulder   Normocytic anemia     Cardiac disease    Disease of thyroid gland    Hyperlipidemia    Hypertension    Hernia, abdominal chronic umbilical   GERD (gastroesophageal reflux disease)    Atrial fibrillation (HCC) h/o atrial fib/ flutter   Dysphagia 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 on full  l iquids   Anxiety    Arthritis    Esophageal cancer ([facility] )    Esophageal cancer ([place] [address] )    Cardiomyopathy, ischemic last assessed: 2015   Chronic kidney disease last assessed: 2015         Review of Systems/Medical History  Patient summary reviewed  Chart reviewed      Cardiovascular  Pacemaker/AICD, Hyperlipidemia, Hypertension , CAD, , Dysrhythmias, ,   Comment: NM myocardial perfusion spect (rx stress and/or rest)   Status: Final result  PACS Images     Show images for NM myocardial perfusion spect (rx stress and/or rest)  Order Report      Order Details   Study Result     St  [address]  [address] [facility]  [phone]     Stress Gated SPECT Myocardial Perfusion Imaging after Regadenoson     Patient: Jason Antonio  MR number: [id number]  Account number: [de-identified]  : 1940  Age: 77 years  Gender: Male  Status: Outpatient  Location:  Height: 67 in  Weight: 257 lb  BP: 162/ 69 mmHg     Allergies: NO KNOWN ALLERGIES     Diagnosis: R07 9 - Chest pain, unspecified     Primary Physician:   Macarena Cintron DO  RN:  Sarai Thomason RN  Referring Physician:  Ed Varma DO  Group: Shlomo Leo  Interpreting Physician:  Marie Sandoval DO     INDICATIONS: Coronary artery disease      HISTORY: The patient is a 77year old  male  Chest pain status: chest pain  Coronary artery disease risk factors: hypertension  Cardiovascular history: congestive heart failure  Prior cardiovascular procedures: pacemaker  Medications: a beta blocker, a diuretic, aspirin, an antiarrhythmic agent, an antihypertensive agent, and thyroid medications      REST ECG: Paced rhythm      PROCEDURE: A regadenoson infusion pharmacologic stress test was performed  Gated SPECT myocardial perfusion imaging was performed during stress  Systolic blood pressure was 162 mmHg, at the start of the study  Diastolic blood pressure was  69 mmHg, at the start of the study  The heart rate was 70 bpm, at the start of the study  Regadenoson protocol:  HR bpm SBP mmHg DBP mmHg Symptoms ST change Rhythm/conduct  Baseline 70 162 69 -- -- --  1 min 70 149 59 none -- paced  2 min 78 135 65 same as above none --  3 min 77 140 65 same as above -- paced  4 min 77 137 63 none -- --  5 min 77 127 62 same as above same as above same as above  No medications or fluids given      STRESS SUMMARY: Duration of pharmacologic stress was 3 min  Maximal heart rate during stress was 93 bpm  The heart rate response to stress was normal  There was normal resting blood pressure with a hypertensive response to stress  The  rate-pressure product for the peak heart rate and blood pressure was 03494  There was no chest pain during stress  The stress test was terminated due to protocol completion  There were no stress arrhythmias or conduction abnormalities      ISOTOPE ADMINISTRATION:  The radiopharmaceutical was injected at the peak effect of pharmacologic stress      MYOCARDIAL PERFUSION IMAGING:  The image quality was good   Left ventricular size was normal      PERFUSION DEFECTS:  -  There was a moderate-sized, moderately severe, reversible myocardial perfusion defect of the septal and anterior wall      GATED SPECT:  The calculated left ventricular ejection fraction was 44 %  There was moderately reduced myocardial thickening and motion of the septal wall of the left ventricle      SUMMARY:  -  Stress results: There was normal resting blood pressure with a hypertensive response to stress  There was no chest pain during stress  -  Perfusion imaging: There was a moderate-sized, moderately severe, reversible myocardial perfusion defect of the septal and anterior wall  -  Gated SPECT: The calculated left ventricular ejection fraction was 44 %  There was moderately reduced myocardial thickening and motion of the septal wall of the left ventricle      IMPRESSIONS: Abnormal study after pharmacologic stress  There was a moderate amount of ischemia in the anterior region extending to the septal wall  Left ventricular systolic function was reduced, with regional wall motion abnormalities      Prepared and signed by     Mark Stovall, DO  Signed 11/27/2017 14:24:17    Cardiomyopathy ([address] ),  Pulmonary       GI/Hepatic  Dysphagia,   GERD , Esophageal disease esophageal cancer, Bowel prep       Chronic kidney disease stage 4,        Endo/Other  History of thyroid disease , hypothyroidism,   Obesity  super morbid obesity   GYN       Hematology  Anemia ,     Musculoskeletal    Arthritis     Neurology   Psychology   Anxiety,              Physical Exam    Airway    Mallampati score: II  TM Distance: >3 FB  Neck ROM: limited     Dental       Cardiovascular  Rhythm: regular, Rate: normal,     Pulmonary  Breath sounds clear to auscultation,     Other Findings        Anesthesia Plan  ASA Score- 4     Anesthesia Type- IV sedation with anesthesia with ASA Monitors  Additional Monitors:   Airway Plan:         Plan Factors-    Induction- intravenous  Postoperative Plan-     Informed Consent- Anesthetic plan and risks discussed with patient

## 2018-02-07 NOTE — PROGRESS NOTES
Hematology - Oncology Progress Note    Erika Diaz [birth year], [id number]  4 [place] 407/4 [facility] 407-*      Impression and plan:    3 71-year-old male recently diagnosed with (at least) stage IIIA esophageal adenocarcinoma  Patient was to begin concurrent treatment with paclitaxel/carboplatin + radiotherapy (definitive treatment) this week  Because of the anemia and renal issues, this is on hold  This is obviously unfortunate for a number of reasons especially that Mr Natalie Bass has been delayed for a number of reasons over the past few months  The longer the cancer has time to grow, the more likely the disease will spread  As soon as patient is cleared from a surgical standpoint and the renal function is better and the hemoglobin level is better/stable, patient can begin treatment  I have contacted Dr Kody Hawk, radiation oncology at [facility]  She is aware of the hospitalization - she will schedule the patient to start RT when he is discharged      2  Anemia  Etiology is unclear  Differential diagnosis includes secondary to GI bleeding (although guaiacs were negative), possibly anemia due to renal insufficiency  Patient received 2 units - hemoglobin level is better  Hemoglobin level continues to move towards the patient's baseline  No further hematology workup is planned      3  Elevated BUN and creatinine  Patient is receiving IV hydration  Both BUN and creatinine continue to trend downward/improve  Continue to monitor      4  Right shoulder abscess  Patient is status post I&D  Postoperative care with Dr Harris Sepulveda is ongoing  Patient is on Ancef  Patient denies any pain control issues    __________________________________________________________________________________________________________________    Chief complaint:  Progressive anemia, elevated BUN and creatinine, recent diagnosis of esophageal cancer     History of present illness:  71-year-old male previously diagnosed with IIIA esophageal cancer sent to the emergency room because of an elevated BUN/creatinine and new onset anemia  Patient was to begin concurrent treatment on February 5, 2018 that was sent to the emergency room after pre-chemotherapy blood work demonstrated a significant drop in hemoglobin level and a significant rise in the BUN/creatinine      Mr Steele received 2 units of packed red blood cells  Patient states feeling okay, better than before  Fatigue is less/better than before  Right shoulder abscess was drained - no pain control issues  No fevers, chills or sweats  Appetite is good, patient denies any GI or  issues  No bleeding  Patient states that he is looking forward to beginning treatment as soon as he is cleared      Cancer history: The below note comes from the outpatient oncology office visit from December 19, 2017      History of present illness     43-year-old male recently referred for the above       Mr Jaja Rabago states having progressive dysphagia over the past 2 or 3 months  Patient states that the food would get stuck midway down  No nausea or vomiting  No abdominal pain or constipation  Mr Jaja Rabago states that he has had problems with diarrhea recently, taking Pepto-Bismol  Patient believes that he has lost 10 pounds over the past 3 or 4 months       Presently patient states feeling okay, same as before  No fevers, chills or sweats  No chest pain or pressure  No headaches, blurred vision or dizziness  No  issues  No recent cardiac issues  No pain control issues  Appetite is good, weight is stable  No issues swallowing, no dysphagia or odynophagia      Discussion/summary     43-year-old male with a number of medical problems recently diagnosed with esophageal adenocarcinoma  Mr Jaja Rabago feels relatively well and clinically there are no obvious signs of metastatic disease  Issues:     1  Esophageal cancer   PET/CT did not demonstrate any evidence of metastatic disease although there were lymph nodes that had increased FDG activity  Any T N1 = stage IIIA; any T N2 = stage IIIB  NCCN guidelines state that for any T, N+ disease, primary treatment includes preoperative chemoradiation (category 1, paclitaxel and carboplatin weekly during RT)  Although the specifics are not presently clear, patient cannot be seen by radiation oncology in [place]  Mr Moise Bautista has been referred to Dr Luiza Hernandez  I discussed the case at length with her today - she will see the patient ASAP  She is also concerned that the patient has never had an EUS (not sure if this is possible now that there is a stent in place)  She will also refer the patient to 1 of her oncologic surgeons (to see if Mr Moise Bautista will be a candidate for surgery eventually)     2 Stent placement  Follow-up with GI is ongoing      3  Multiple other medical and cardiac issues, elevated creatinine level  Patient will follow-up with [facility] as directed       The above was discussed with patient and friend; all questions were answered  Mr Moise Bautista is to return in 2 weeks but this may change depending upon the above    Patient knows to call if he has any other oncology questions or concerns      Hospital medications list:    Current Facility-Administered Medications:  acetaminophen 650 mg Oral Q6H PRN Misael Jose MD    amiodarone 200 mg Oral BID Misael Jose MD    aspirin 81 mg Oral Daily Misael Jose MD    cefazolin 1,000 mg Intravenous 30 min pre-procedure Misael Jose MD    clindamycin 300 mg Oral 4x Daily Misael Jose MD    levothyroxine 25 mcg Oral Daily Misael Jose MD    metoprolol succinate 50 mg Oral Daily Misael Jose MD    oxyCODONE-acetaminophen 1 tablet Oral Q4H PRN Dariela Morgan MD    pravastatin 80 mg Oral Daily With Ilya Zabala MD    promethazine 12 5 mg Oral TID Misael Jose MD    sertraline 50 mg Oral Daily Misael Jose MD    sodium chloride 50 mL/hr Intravenous Continuous Maxine Merino MD Last Rate: Stopped (02/07/18 0848)   sodium chloride 125 mL/hr Intravenous Continuous Дмитрий Fernandez MD Last Rate: 125 mL/hr (02/07/18 0849)   sucralfate 1,000 mg Oral 4x Daily (AC & HS) Yanira Graves MD    traZODone 150 mg Oral HS Yanira Graves MD      Facility-Administered Medications Ordered in Other Encounters:  fentaNYL 25 mcg Intravenous PRN Vale Parker MD   ondansetron 4 mg Intravenous Once PRN Vale Parker MD   promethazine 12 5 mg Intravenous Once PRN Vale Parker MD     Physical exam  /92 (BP Location: Left arm)   Pulse 68   Temp 97 6 °F (36 4 °C) (Oral)   Resp 18   Ht 5' 7" (1 702 m)   Wt 111 kg (245 lb 3 2 oz)   SpO2 94%   BMI 38 40 kg/m²   Constitutional: Well formed, well-nourished in no apparent distress  Eyes: PERRL, conjunctiva orange pink, anicteric    HENT: Atraumatic, external ears normal, nose normal,    Oropharynx: moist, no pharyngeal exudates, no thrush, pink  Neck: Good range of motion, no adenopathy  Respiratory: scattered bilateral rhonchi, distant breath sounds bilaterally  Cardiovascular: Normal rate, normal rhythm, no murmurs, no gallops, no rubs    GI: Soft, obese cannot palpate liver or spleen, + bowel sounds, nontender  : No costovertebral angle tenderness, normal reproductive organs, no discharge  Musculoskeletal: No pain or tenderness with palpation of joints, muscles or bones  Integument: slightly pale,, warm, good turgor, scattered ecchymoses, no petechiae, right shoulder bandage in place (deferred)  Lymphatic: No adenopathy in the neck, supra-clavicular region, axilla and groin bilaterally  Extremities: 01 + bilateral lower extremity edema, no cords, pulses are 1+  Neurologic: Alert & oriented x 3, CN 2-12 normal, normal motor function, normal sensory function, no focal deficits noted  Psychiatric: pleasant, responsive, response time within normal limits, appropriate  Rectal: Deferred    Laboratory test results    Results for Sydnee Castillo (MRN [id number]) as of 2/7/2018 12:49   Ref  Range 12/22/2017 13:48 2/5/2018 09:33 2/5/2018 18:46 2/6/2018 08:30 2/7/2018 06:03   WBC Latest Ref Range: 4 80 - 10 80 Thousand/uL 6 20 9 00 7 00 9 30 7 80   RBC Latest Ref Range: 4 70 - 6 10 Million/uL 4 05 (L) 2 84 (L) 2 56 (L) 3 22 (L) 3 54 (L)   Hemoglobin Latest Ref Range: 14 0 - 18 0 g/dL 11 8 (L) 7 9 (L) 7 1 (L) 9 3 (L) 10 3 (L)   Hematocrit Latest Ref Range: 42 0 - 52 0 % 36 3 (L) 24 7 (L) 21 8 (L) 28 5 (L) 31 3 (L)   MCV Latest Ref Range: 82 - 98 fL 90 87 85 89 89   MCH Latest Ref Range: 27 0 - 31 0 pg 29 1 27 9 27 8 28 9 29 1   MCHC Latest Ref Range: 31 4 - 37 4 g/dL 32 5 32 1 32 7 32 6 32 8   RDW Latest Ref Range: 11 6 - 15 1 % 15 3 (H) 15 4 (H) 14 2 14 5 14 8   Platelets Latest Ref Range: 130 - 400 Thousands/uL 216 325 324 245 273   MPV Latest Ref Range: 8 9 - 12 7 fL 7 1 (L) 7 9 (L) 7 7 (L) 8 0 (L) 7 9 (L)       Results for Miguel Messina (MRN [id number]) as of 2/7/2018 12:49   Ref  Range 12/22/2017 13:48 2/5/2018 09:33 2/5/2018 20:15 2/6/2018 08:30 2/7/2018 06:03   BUN Latest Ref Range: 5 - 25 mg/dL 21 65 (H) 54 (H) 54 (H) 38 (H)   Creatinine Latest Ref Range: 0 60 - 1 30 mg/dL 1 72 (H) 3 27 (H) 2 52 (H) 2 46 (H) 1 96 (H)       Radiology reports     11/2/17 CAT scan soft tissue of the neck did not demonstrate any mass  Patient had shotty paratracheal adenopathy on the right  Patient had atherosclerotic calcification of the carotid bifurcation bilaterally with moderate left stenosis  CAT scan of the chest and abdomen/pelvis demonstrated a circumferential thickening of the distal esophagus consistent with a history of esophageal mass  Patient had missed I'll adenopathy suspicious for metastatic disease  Patient had a 4 mm left lower lobe pulmonary nodule and high-density material in the gallbladder  Patient has small hiatal hernia, diverticulosis and a ventral abdominal wall hernia containing a loop of bowel  Patient had bilateral inguinal hernias right greater than left   The right-sided hernia containing portion of the bladder       PET 12/1/17 PET/CT demonstrated intense FDG activity over approximately a 6 cm segment of the distal esophagus, SUV = 15 2  Patient had 2 right paraesophageal nodes with mild hypermetabolism as well as mild right hilar activity  No other evidence of metastases in the neck, abdomen or pelvis  Patient had left basilar infiltrates/atelectasis and small bilateral pleural effusions   Patient had prostatic calcifications with somewhat prominent FDG activity, SUV = 5 3       Pathology      10/31/17 distal esophageal tumor biopsy demonstrated poorly differentiated adenocarcinoma in the background of intestinal metaplasia

## 2018-02-07 NOTE — CASE MANAGEMENT
Continued Stay Review    Date: 2/7/18    Vital Signs: /92 (BP Location: Left arm)   Pulse 68   Temp 97 6 °F (36 4 °C) (Oral)   Resp 18   Ht 5' 7" (1 702 m)   Wt 111 kg (245 lb 3 2 oz)   SpO2 94%   BMI 38 40 kg/m²       TAP WATER ENEMA  NPO  COLONOSCOPY   Medications:   Scheduled Meds:   Current Facility-Administered Medications:  acetaminophen 650 mg Oral Q6H PRN Laly Guaman MD    amiodarone 200 mg Oral BID Laly Guaman MD    aspirin 81 mg Oral Daily Laly Guaman MD    cefazolin 1,000 mg Intravenous 30 min pre-procedure Laly Guaman MD    levothyroxine 25 mcg Oral Daily Laly Guaman MD    metoprolol succinate 50 mg Oral Daily Laly Guaman MD    oxyCODONE-acetaminophen 1 tablet Oral Q4H PRN Ajay Arora MD    pravastatin 80 mg Oral Daily With Matthew Addison MD    promethazine 12 5 mg Oral TID Laly Guaman MD    sertraline 50 mg Oral Daily Laly Guaman MD    sodium chloride 50 mL/hr Intravenous Continuous Rachelle Recinos MD Last Rate: Stopped (02/07/18 0848)   sodium chloride 125 mL/hr Intravenous Continuous Shelley Adkins MD Last Rate: 125 mL/hr (02/07/18 1225)   sucralfate 1,000 mg Oral 4x Daily (AC & HS) Laly Guaman MD    traZODone 150 mg Oral HS Laly Guaman MD      Facility-Administered Medications Ordered in Other Encounters:  fentaNYL 25 mcg Intravenous PRN Arlene Curran MD   ondansetron 4 mg Intravenous Once PRN Arlene Curran MD   promethazine 12 5 mg Intravenous Once PRN rAlene Curran MD     Continuous Infusions:   sodium chloride 50 mL/hr Last Rate: Stopped (02/07/18 0848)   sodium chloride 125 mL/hr Last Rate: 125 mL/hr (02/07/18 1225)     PRN Meds:   acetaminophen    oxyCODONE-acetaminophen    Abnormal Labs/Diagnostic Results:     Age/Sex: 69 y o  male     ATTENDING  Assessment/Plan:  New Onset Acute Normocytic Anemia: BL Hgb ~ 11 2  Hgb was 7 1-7 9 on arrival which required 2U PRBC transfusion overnight on 2/6/18   Hgb this morning is stable and improved at 10 3 Unsure as to cause at this time  FOBT done in the ED negative  Pt denies bleeding per rectum or hematemesis  Monitor daily CBC  GI consulted and pt will be getting colonoscopy done today  Prep completed  LIVAN on CKD stage IV: BL Cr ~ 1 5  Cr improving from 3 27 on admission to 1 96 this morning  Larri [place] Possibly 2/2 dehydration  Continue IVF with NS @ 125 cc/hr and monitor daily BMP  If Cr worsens, will consider nephrology consult, but at this time since it seems to be improving adequately, will hold off  Esophageal Cancer: Pt had esophageal stent placed by Dr Karl Rosado (GI) on 1/11/18  He had seen Dr Olivier Burns (Heme-Onc) on 1/25/18 and it was decided that he would begin chemotherapy with Paclitaxel and Carboplatin the day of admission, which he was unable to do due to abnormal lab work as above  Dr Olivier Burns consulted and chemotherapy will be on hold at this time until work up for above is complete  Recurrent Abscess/Cyst of Right Shoulder s/p I&D on 2/6/18: Pt completed 7 day course of Clindamycin 300mg 4x daily yesterday  He went to the OR to have I&D of abscess by Dr Carl Phan yesterday  Procedure went well without any complications  Hx of A  Fib/A  Flutter: stable, c/w Metoprolol and Amiodarone  Xarelto has been held for R shoulder abscess I&D and will be held further for colonoscopy and acute anemia  Will monitor pt on telemetry  Hx of Combined Systolic and Diastolic CHF: Last echo done on 8/31/17 showed EF of 45-50%  Pt does not appear to be overtly fluid over loaded at this time  Will hold home Lasix due to LIVAN from possible dehydration  Hx of Ischemic Cardiomyopathy with Pacemaker: stable  Pt follows outpatient with cardiologist Dr Chaudhry Coil  Continue ASA  Hypertension: stable, continue Metoprolol  Will hold Lisinopril due to LIVAN  Hyperlipidemia: stable, continue Pravastatin  Hypothyroidism: Last TSH done 11/25/17 was 7 178  Continue Levothyroxine     GERD: stable, c/w Carafate  Depression: C/w Sertraline and Trazodone     Global: Xarelto (currently held for colonoscopy, will restart after procedure), monitor and replete electrolytes     SURGEON  Assessment:  Status post incision and drainage of large deep right shoulder abscess  Plan:  Continue to place new six sterile dry dressings  Will be packing in place today  Okay to resume blood thinners     COLONOSCOPY  FINDINGS   1   Cecum and ileocecal valve-normal mucosa  2  Sigmoid colon-diverticuli seen  3  No evidence of any polyps or other lesions    IMPRESSIONS:    As above  RECOMMENDATIONS:  Regular diet and observe  COMPLICATIONS:  None; patient tolerated the procedure well  ONCOLOGY CONSULT 2/6/18  Impression and plan:     3 64-year-old male recently diagnosed with (at least) stage IIIA esophageal adenocarcinoma  Patient was to begin concurrent treatment with paclitaxel/carboplatin + radiotherapy (definitive treatment) this week  Because of the anemia and renal issues, this is on hold  This is obviously unfortunate for a number of reasons especially that Mr Jaja Rabago has been delayed for a number of reasons over the past few months  The longer the cancer has time to grow, the more likely the disease will spread  As soon as patient is cleared from a surgical standpoint and the renal function is better and the hemoglobin level is better/stable, patient can begin treatment      2  Anemia  Etiology is unclear  Differential diagnosis includes secondary to GI bleeding (although guaiacs were negative), possibly anemia due to renal insufficiency  Patient received 2 units - hemoglobin level is better  Continue to monitor      3  Elevated BUN and creatinine  Patient is receiving IV hydration  Renal function is being monitored      4  Right shoulder abscess  Patient is status post I&D  Postoperative care with Dr Vick Whelan is ongoing      The above was discussed with the patient; all questions were answered  Will follow with you   Please do not hesitate to contact the Hematology service if you have any questions or concerns    Thank you for this consult

## 2018-02-07 NOTE — OP NOTE
COLONOSCOPY    PROCEDURE: Colonoscopy    INDICATIONS: Anemia, Iron Deficiency    POST-OP DIAGNOSIS: See the impression below    SEDATION: Monitored anesthesia care, check anesthesia records    PHYSICAL EXAM:    /79   Pulse 69   Temp 97 6 °F (36 4 °C) (Oral)   Resp 18   Ht 5' 7" (1 702 m)   Wt 111 kg (245 lb 3 2 oz)   SpO2 98%   BMI 38 40 kg/m²   Body mass index is 38 4 kg/m²  General: NAD  Heart: S1 & S2 normal, RRR  Lungs: CTA, No rales or rhonchi  Abdomen: Soft, nontender, nondistended, good bowel sounds    CONSENT:  Informed consent was obtained for the procedure, including sedation after explaining the risks and benefits of the procedure  Risks including but not limited to bleeding, perforation, infection, aspiration were discussed in detail  Also explained about less than 100%$ sensitivity with the exam and other alternatives  PREPARATION:   EKG tracing, pulse oximetry, blood pressure were monitored throughout the procedure  Patient was identified by myself both verbally and by visual inspection of ID band  DESCRIPTION:   Patient was placed in the left lateral decubitus position and was sedated with the above medication  Digital rectal examination was performed  The colonoscope was introduced in to the anal canal and advanced up to cecum, which was identified by the appendiceal orifice and IC valve  A careful inspection was made as the colonoscope was withdrawn, including a retroflexed view of the rectum; findings and interventions are described below  Appropriate photodocumentation was obtained  The quality of the colonic preparation was adequate  FINDINGS:    1  Cecum and ileocecal valve-normal mucosa    2  Sigmoid colon-diverticuli seen    3  No evidence of any polyps or other lesions         IMPRESSIONS:      As above    RECOMMENDATIONS:    Regular diet and observe    COMPLICATIONS:  None; patient tolerated the procedure well      DISPOSITION: PACU           CONDITION: Stable

## 2018-02-07 NOTE — PROGRESS NOTES
Progress Note - General Surgery   Trenton Vega 69 y o  male MRN: [id number]  Unit/Bed#: [address] [place] [address] Encounter: [id number]    Assessment:  Status post incision and drainage of large deep right shoulder abscess    Plan:  Continue to place new six sterile dry dressings  Will be packing in place today  Okay to resume blood thinners    Subjective/Objective   Chief Complaint:  Abscess right shoulder  Subjective:  Doing well  Feels better since strain    Objective: Incision and drainage and no yesterday  Packing placed  Blood pressure 128/92, pulse 68, temperature 97 6 °F (36 4 °C), temperature source Oral, resp  rate 18, height 5' 7" (1 702 m), weight 111 kg (245 lb 3 2 oz), SpO2 94 %  ,Body mass index is 38 4 kg/m²  Intake/Output Summary (Last 24 hours) at 02/07/18 1036  Last data filed at 02/07/18 0837   Gross per 24 hour   Intake                0 ml   Output              725 ml   Net             -725 ml       Invasive Devices     Peripherally Inserted Central Catheter Line            PICC Line 02/05/18 Right 2 days                Physical Exam:  Right shoulder  Moderate drainage on dressing  Cellulitis decreased    Lab, Imaging and other studies:I have personally reviewed pertinent lab results      VTE Pharmacologic Prophylaxis: Heparin  VTE Mechanical Prophylaxis: sequential compression device

## 2018-02-07 NOTE — PROGRESS NOTES
[address] [facility] 66 [address] 83 [facility] Practice Progress Note - Benay Class 69 y o  male MRN: [id number]    Unit/Bed#: [address] [place] [address] Encounter: [id number]      Assessment/Plan:    New Onset Acute Normocytic Anemia: BL Hgb ~ 11 2  Hgb was 7 1-7 9 on arrival which required 2U PRBC transfusion overnight on 2/6/18  Hgb this morning is stable and improved at 10 3 Unsure as to cause at this time  FOBT done in the ED negative  Pt denies bleeding per rectum or hematemesis  Monitor daily CBC  GI consulted and pt will be getting colonoscopy done today  Prep completed        LIVAN on CKD stage IV: BL Cr ~ 1 5  Cr improving from 3 27 on admission to 1 96 this morning  Nate Sam Possibly 2/2 dehydration  Continue IVF with NS @ 125 cc/hr and monitor daily BMP  If Cr worsens, will consider nephrology consult, but at this time since it seems to be improving adequately, will hold off       Esophageal Cancer: Pt had esophageal stent placed by Dr Tiffanie Minor (GI) on 1/11/18  He had seen Dr Cedric Lewis (Heme-Onc) on 1/25/18 and it was decided that he would begin chemotherapy with Paclitaxel and Carboplatin the day of admission, which he was unable to do due to abnormal lab work as above  Dr Cedric Lewis consulted and chemotherapy will be on hold at this time until work up for above is complete      Recurrent Abscess/Cyst of Right Shoulder s/p I&D on 2/6/18: Pt completed 7 day course of Clindamycin 300mg 4x daily yesterday  He went to the OR to have I&D of abscess by Dr Coleman Welch yesterday  Procedure went well without any complications       Hx of A  Fib/A  Flutter: stable, c/w Metoprolol and Amiodarone  Xarelto has been held for R shoulder abscess I&D and will be held further for colonoscopy and acute anemia  Will monitor pt on telemetry      Hx of Combined Systolic and Diastolic CHF: Last echo done on 8/31/17 showed EF of 45-50%  Pt does not appear to be overtly fluid over loaded at this time   Will hold home Lasix due to LIVAN from possible dehydration      Hx of Ischemic Cardiomyopathy with Pacemaker: stable  Pt follows outpatient with cardiologist Dr Volodymyr Diaz  Continue ASA       Hypertension: stable, continue Metoprolol  Will hold Lisinopril due to LIVAN       Hyperlipidemia: stable, continue Pravastatin       Hypothyroidism: Last TSH done 11/25/17 was 7 178  Continue Levothyroxine       GERD: stable, c/w Carafate       Depression: C/w Sertraline and Trazodone       Global: Xarelto (currently held for colonoscopy, will restart after procedure), monitor and replete electrolytes as needed, IVF, Cardiac diet       Subjective:   Pt seen and examined at bedside  No acute overnight events  He is doing well and denies CP, SOB, abdominal pain, headaches, dizziness, N/V  Did well with bowel prep for colonoscopy today  Objective:     Vitals: Blood pressure 122/61, pulse 69, temperature (!) 97 4 °F (36 3 °C), temperature source Tympanic, resp  rate 18, height 5' 7" (1 702 m), weight 111 kg (245 lb 3 2 oz), SpO2 94 %  ,Body mass index is 38 4 kg/m²  Wt Readings from Last 3 Encounters:   02/06/18 111 kg (245 lb 3 2 oz)   02/01/18 113 kg (248 lb 9 6 oz)   01/31/18 111 kg (245 lb 12 8 oz)       Intake/Output Summary (Last 24 hours) at 02/07/18 0740  Last data filed at 02/06/18 2201   Gross per 24 hour   Intake                0 ml   Output              575 ml   Net             -575 ml       Physical Exam:   General: Pt is AAO x 3, not in any acute distress  Cardio: RRR, S1 and S2 +, no murmurs/rubs/gallops/clicks  Resp: CTA b/l, no wheezes/rales/rhonchi  Abdomen: soft, NT/ND, BS+  Extremities: no cyanosis or edema  PP 2+ b/l  Right shoulder abscess s/p I&D  Dressing present and is C/D/I  Minimal lower extremity edema       Recent Results (from the past 24 hour(s))   Basic metabolic panel    Collection Time: 02/06/18  8:30 AM   Result Value Ref Range    Sodium 136 136 - 145 mmol/L    Potassium 4 4 3 5 - 5 3 mmol/L    Chloride 103 100 - 108 mmol/L    CO2 25 21 - 32 mmol/L    Anion Gap 8 4 - 13 mmol/L BUN 54 (H) 5 - 25 mg/dL    Creatinine 2 46 (H) 0 60 - 1 30 mg/dL    Glucose 102 65 - 140 mg/dL    Glucose, Fasting 102 (H) 65 - 99 mg/dL    Calcium 8 4 8 3 - 10 1 mg/dL    eGFR 25 ml/min/1 73sq m   Magnesium    Collection Time: 02/06/18  8:30 AM   Result Value Ref Range    Magnesium 2 0 1 6 - 2 6 mg/dL   Phosphorus    Collection Time: 02/06/18  8:30 AM   Result Value Ref Range    Phosphorus 3 0 2 3 - 4 1 mg/dL   CBC (With Platelets)    Collection Time: 02/06/18  8:30 AM   Result Value Ref Range    WBC 9 30 4 80 - 10 80 Thousand/uL    RBC 3 22 (L) 4 70 - 6 10 Million/uL    Hemoglobin 9 3 (L) 14 0 - 18 0 g/dL    Hematocrit 28 5 (L) 42 0 - 52 0 %    MCV 89 82 - 98 fL    MCH 28 9 27 0 - 31 0 pg    MCHC 32 6 31 4 - 37 4 g/dL    RDW 14 5 11 6 - 15 1 %    Platelets 789 895 - 343 Thousands/uL    MPV 8 0 (L) 8 9 - 12 7 fL   Fingerstick Glucose (POCT)    Collection Time: 02/06/18  1:11 PM   Result Value Ref Range    POC Glucose 99 65 - 140 mg/dl   Comprehensive metabolic panel    Collection Time: 02/07/18  6:03 AM   Result Value Ref Range    Sodium 139 136 - 145 mmol/L    Potassium 4 1 3 5 - 5 3 mmol/L    Chloride 104 100 - 108 mmol/L    CO2 26 21 - 32 mmol/L    Anion Gap 9 4 - 13 mmol/L    BUN 38 (H) 5 - 25 mg/dL    Creatinine 1 96 (H) 0 60 - 1 30 mg/dL    Glucose 96 65 - 140 mg/dL    Calcium 8 3 8 3 - 10 1 mg/dL    AST 20 5 - 45 U/L    ALT 15 12 - 78 U/L    Alkaline Phosphatase 56 46 - 116 U/L    Total Protein 7 6 6 4 - 8 2 g/dL    Albumin 2 9 (L) 3 5 - 5 0 g/dL    Total Bilirubin 0 30 0 20 - 1 00 mg/dL    eGFR 32 ml/min/1 73sq m   CBC    Collection Time: 02/07/18  6:03 AM   Result Value Ref Range    WBC 7 80 4 80 - 10 80 Thousand/uL    RBC 3 54 (L) 4 70 - 6 10 Million/uL    Hemoglobin 10 3 (L) 14 0 - 18 0 g/dL    Hematocrit 31 3 (L) 42 0 - 52 0 %    MCV 89 82 - 98 fL    MCH 29 1 27 0 - 31 0 pg    MCHC 32 8 31 4 - 37 4 g/dL    RDW 14 8 11 6 - 15 1 %    Platelets 447 930 - 735 Thousands/uL    MPV 7 9 (L) 8 9 - 12 7 fL Magnesium    Collection Time: 02/07/18  6:03 AM   Result Value Ref Range    Magnesium 1 9 1 6 - 2 6 mg/dL   Phosphorus    Collection Time: 02/07/18  6:03 AM   Result Value Ref Range    Phosphorus 2 2 (L) 2 3 - 4 1 mg/dL       Current Facility-Administered Medications   Medication Dose Route Frequency Provider Last Rate Last Dose    acetaminophen (TYLENOL) tablet 650 mg  650 mg Oral Q6H PRN Moira Pickard MD   650 mg at 02/05/18 2329    amiodarone tablet 200 mg  200 mg Oral BID Moira Pickard MD   200 mg at 02/06/18 1812    aspirin chewable tablet 81 mg  81 mg Oral Daily Moiar Pickard MD   81 mg at 02/06/18 0836    ceFAZolin (ANCEF) IVPB (premix) 1,000 mg  1,000 mg Intravenous 30 min pre-procedure Moira Pickard MD   1,000 mg at 02/06/18 1057    clindamycin (CLEOCIN) capsule 300 mg  300 mg Oral 4x Daily Moira Pickard MD   300 mg at 02/06/18 2205    levothyroxine tablet 25 mcg  25 mcg Oral Daily Moira Pickard MD   25 mcg at 02/07/18 0529    metoprolol succinate (TOPROL-XL) 24 hr tablet 50 mg  50 mg Oral Daily Moira Pickard MD   50 mg at 02/06/18 7205    oxyCODONE-acetaminophen (PERCOCET) 5-325 mg per tablet 1 tablet  1 tablet Oral Q4H PRN Louie Salinas MD        pravastatin (PRAVACHOL) tablet 80 mg  80 mg Oral Daily With Demetrio Gutierrez MD   80 mg at 02/06/18 1732    promethazine (PHENERGAN) 12 5 mg/10 mL oral syrup 12 5 mg  12 5 mg Oral TID Moira Pickard MD   12 5 mg at 02/06/18 2222    sertraline (ZOLOFT) tablet 50 mg  50 mg Oral Daily Moira Pickard MD   50 mg at 02/06/18 0836    sodium chloride 0 9 % infusion  50 mL/hr Intravenous Continuous Janes Pratt MD 50 mL/hr at 02/06/18 2223 50 mL/hr at 02/06/18 2223    sucralfate (CARAFATE) oral suspension 1,000 mg  1,000 mg Oral 4x Daily (AC & HS) Moira Pickard MD   1,000 mg at 02/06/18 2205    traZODone (DESYREL) tablet 150 mg  150 mg Oral HS Moira Pickard MD   150 mg at 02/06/18 2205     Facility-Administered Medications Ordered in Other Encounters Medication Dose Route Frequency Provider Last Rate Last Dose    fentaNYL (SUBLIMAZE) injection 25 mcg  25 mcg Intravenous PRN Babita Covarrubias MD        ondansetron [facility]) injection 4 mg  4 mg Intravenous Once PRN Babita Covarrubias MD        [facility]) injection 12 5 mg  12 5 mg Intravenous Once PRN Babita Covarrubias MD           Invasive Devices     Peripherally Inserted Central Catheter Line            PICC Line 02/05/18 Right 2 days                Lab, Imaging and other studies: I have personally reviewed pertinent reports  VTE Pharmacologic Prophylaxis: Xarelto held for colonoscopy  Will restart after procedure     VTE Mechanical Prophylaxis: Tory Pickard MD

## 2018-02-07 NOTE — PLAN OF CARE
DISCHARGE PLANNING     Discharge to home or other facility with appropriate resources Progressing        DISCHARGE PLANNING - CARE MANAGEMENT     Discharge to post-acute care or home with appropriate resources Progressing        HEMATOLOGIC - ADULT     Maintains hematologic stability Progressing        INFECTION - ADULT     Absence or prevention of progression during hospitalization Progressing        Knowledge Deficit     Patient/family/caregiver demonstrates understanding of disease process, treatment plan, medications, and discharge instructions Progressing        Nutrition/Hydration-ADULT     Nutrient/Hydration intake appropriate for improving, restoring or maintaining nutritional needs Progressing        Potential for Falls     Patient will remain free of falls Progressing        SAFETY ADULT     Maintain or return to baseline ADL function Progressing     Maintain or return mobility status to optimal level Progressing        SKIN/TISSUE INTEGRITY - ADULT     Skin integrity remains intact Progressing     Incision(s), wounds(s) or drain site(s) healing without S/S of infection Progressing     Oral mucous membranes remain intact Progressing

## 2018-02-08 ENCOUNTER — TRANSITIONAL CARE MANAGEMENT (OUTPATIENT)
Dept: FAMILY MEDICINE CLINIC | Facility: CLINIC | Age: 78
End: 2018-02-08

## 2018-02-08 ENCOUNTER — OFFICE VISIT (OUTPATIENT)
Dept: SURGERY | Facility: CLINIC | Age: 78
End: 2018-02-08
Payer: COMMERCIAL

## 2018-02-08 VITALS
HEART RATE: 74 BPM | BODY MASS INDEX: 38.45 KG/M2 | DIASTOLIC BLOOD PRESSURE: 78 MMHG | SYSTOLIC BLOOD PRESSURE: 122 MMHG | TEMPERATURE: 97.9 F | WEIGHT: 245 LBS | HEIGHT: 67 IN

## 2018-02-08 DIAGNOSIS — Z09 ENCOUNTER FOR RECHECK OF ABSCESS FOLLOWING INCISION AND DRAINAGE: ICD-10-CM

## 2018-02-08 DIAGNOSIS — Z48.00 CHANGE OR REMOVAL OF WOUND PACKING: Primary | ICD-10-CM

## 2018-02-08 PROCEDURE — 99024 POSTOP FOLLOW-UP VISIT: CPT | Performed by: SPECIALIST

## 2018-02-08 NOTE — CASE MANAGEMENT
Nisha Perez MD Resident Attested Addendum Family Medicine  Discharge Summaries Date of Service: 2/7/2018  8:19 PM      Attestation signed by Michael Suero MD at 2/7/2018 10:16 PM           Teaching Physician Statement  I performed history and exam of patient  I discussed with the Resident  I agree with the resident's documented findings and plan of care  Doing well s/p colonoscopy  No complaints  VSS  [place] Cr improving  HGB stable  Colonoscopy revealed no abnl  Lungs clear  Heart RRR  Abd benegn  [id number] Fernanda Hsakins for d/c              Expand All Collapse All    []Hide copied text  [facility] Discharge Summary - Medical Valere Nims 69 y o  male MRN: [id number]  1 [facility] Bed: 4 [facility] 407/4 jorge Radhamarilu Encounter: [id number]     BRIEF OVERVIEW  Admitting Provider: Chandana Lui MD  Discharge Provider: Michael Suero MD     Discharge To: Home/Self Care     Outpatient Follow-Up: 1) Appointment at [facility] with Dr Lenin Ahmadi on 2/16/18 at 2:30 PM  2) Follow up with Dr Brandon Ramos within 1 week  Things to address at first follow up visit: 1) Repeat CBC and BMP to check Hgb and Cr levels 2) Pt's son would like to review patient's medication list and get refills for certain medications  He was unable to come to the hospital on discharge  Explained over the phone and addressed majority of questions, but it would be beneficial to review them in person as well  3) Held pt's home medications Lisinopril and Lasix due to LIVAN  Consider restarting outpatient  BPs were stable throughout stay  4) Pt's son is worried about pt's decreased appetite  Discussed with him that we can consider oral appetite stimulation medications at follow up visit if he continues not to eat     Labs and results pending at discharge: None     Admission Date: 2/5/2018     Discharge Date: 2/7/2018     Primary Discharge Diagnosis  Principal Problem:    Normocytic anemia  Active Problems:    Acute renal failure superimposed on stage 4 chronic kidney disease (HCC)    Esophageal cancer (HCC)    Atrial flutter with rapid ventricular response (HCC)    Hypothyroidism    Body mass index (BMI) of 45 0-49 9 in adult ([facility] [address] )    Cardiomyopathy ([facility] )    Chronic coronary artery disease    Mixed anxiety depressive disorder    Gastroesophageal reflux disease    Presence of cardiac pacemaker    Infected cyst of right shoulder  Resolved Problems:    * No resolved hospital problems  *     Consulting Providers   Dr William Stephens Figures  HPI  Uri Nael is a 68 yo M with PMH of esophageal cancer s/p esophageal stent placement, fib/flutter, chronic systolic and diastolic CHF, ischemic cardiomyopathy with pacemaker, hypertension, hyperlipidemia, hypothyroidism who presents today for work up of abnormal blood work  Pt has history of recently diagnosed esophageal cancer and follows Dr Da Puri outpatient  Per Dr Claudio Leonardo note on 1/25/18, pt was going to begin chemotherapy with Paclitaxel and Carboplatin given during radiation  Today pt presented to the infusion center for placement of PICC line, blood work and initiation of chemo  Although after PICC line was placed and blood work was drawn, pt was noted to have Hgb of 7 9 which has decreased from last Hgb level done on 12/22/17 which was 11 8  Pt's baseline Hgb is ~11 2  A CMP also revealed that pt's Cr had gone up to 3 27 today when his last Cr on 12/22/17 was 1 72  Pt's baseline Cr is ~1 5  Pt is currently with his son's ex gf who cares for him occasionally who states that pt has had a decreased appetite lately and not been eating or drinking as much  He denies any other symptoms including shortness of breath, chest pain, bleeding per rectum, nausea/vomiting, fevers, chills, headaches, dizziness, blurry vision, fatigue          Hospital Course     New Onset Acute Normocytic Anemia: BL Hgb ~ 11 2   Hgb was 7 1-7 9 on arrival which required 2U PRBC transfusion overnight on 2/6/18  Hgb on day of discharge is stable and improved at 10 3 Unsure as to cause at this time  Pt was asymptomatic throughout stay  FOBT done in the ED negative  Pt denies bleeding per rectum or hematemesis  GI consulted and performed a colonoscopy which was negative  Anemia may be 2/2 LIVAN as below per Dr Carla Santiago which has improved on discharge       LIVAN on CKD stage IV: BL Cr ~ 1 5  Cr was 3 27 on admission  Pt was given IVF with NS @ 125 cc/hr and improved to 1 96 on day of discharge  Possibly 2/2 dehydration  Due to improving Cr, nephrology was not consulted  Home medication Lasix held  Consider restarting at follow up visit if Cr level is stable       Esophageal Cancer: Pt had esophageal stent placed by Dr Dalia Acevedo (GI) on 1/11/18  He had seen Dr Carla Santiago ([facility]) on 1/25/18 and it was decided that he would begin chemotherapy with Paclitaxel and Carboplatin, which he was unable to do due to abnormal lab work as above  Dr Carla Santiago was consulted and chemotherapy was held until values improved  Pt will follow up outpatient with Dr Carla Santiago for further management       Recurrent Abscess/Cyst of Right Shoulder s/p I&D on 2/6/18: Pt completed 7 day course of Clindamycin 300mg 4x daily  He went to the OR to have I&D of abscess by Dr Bárbara Klein while inpatient  Procedure went well without any complications       Hx of A  Fib/A  Flutter: stable, continued on Metoprolol and Amiodarone  Xarelto was initially held for R shoulder abscess I&D and colonoscopy  He was monitored on telemetry and remained in NSR      Hx of Combined Systolic and Diastolic CHF: Last echo done on 8/31/17 showed EF of 45-50%  Pt did not appear to be fluid over loaded  Home Lasix held due to LIVAN from possible dehydration      Hx of Ischemic Cardiomyopathy with Pacemaker: stable  Pt follows outpatient with cardiologist Dr Lisa Jaffe  Continued on ASA       Hypertension: stable, continued on Metoprolol  Lisinopril held due to LIVAN  Consider restarting outpatient if pt's Cr is stable       Hyperlipidemia: stable, continued on Pravastatin       Hypothyroidism: Last TSH done 11/25/17 was 7 178   Continued on Levothyroxine       GERD: stable, continued on Carafate       Depression: Continued on Sertraline and Trazodone       Medications     amiodarone 200 mg tablet   Take 1 tablet by mouth 2 (two) times a day   Refills: 0   Next Dose Due: 2/8/18 - 09 00                ASPIRIN LOW DOSE 81 MG tablet   Generic drug: aspirin   Take by mouth   Refills: 0   Next Dose Due: 2/8/18 - 09 00                clindamycin 300 MG capsule   Commonly known as: CLEOCIN   Take 1 capsule (300 mg total) by mouth 4 (four) times a day for 7 days   Refills: 0                levothyroxine 25 mcg tablet   Take 25 mcg by mouth daily   Refills: 0   Next Dose Due: 2/8/18 - 07 00                metoprolol succinate 50 mg 24 hr tablet   Commonly known as: TOPROL-XL   Take 50 mg by mouth daily Takes 3 tabs   Refills: 0   Next Dose Due: 2/8/18 - 09 00                multivitamin tablet   Take 1 tablet by mouth daily   Refills: 0   Next Dose Due: 2/8/18 - 09 00                omeprazole 40 MG capsule   Commonly known as: PriLOSEC   Take by mouth daily   Refills: 0   Next Dose Due: 2/8/18 - 07 00                pravastatin 80 mg tablet   Commonly known as: PRAVACHOL   Take by mouth daily   Refills: 0   Next Dose Due: 2/8/18 - 18 00                promethazine 12 5 mg/10 mL syrup   Commonly known as: PHENERGAN   Take 10 mL by mouth 3 (three) times a day   Refills: 0   Next Dose Due: 2/8/18 - 09 00                rivaroxaban 20 mg tablet   Commonly known as: XARELTO   Take 1 tablet by mouth daily with breakfast   Refills: 1   What changed: additional instructions   Next Dose Due: 2/8/18 - 09 00                sertraline 50 mg tablet   Commonly known as: ZOLOFT   Take 50 mg by mouth daily   Refills: 0   Next Dose Due: 2/8/18 - 09 00                sucralfate 1 g/10 mL suspension   Commonly known as: CARAFATE   Take 10 mL by mouth 4 (four) times a day (before meals and at bedtime)   Refills: 0   Next Dose Due: 2/8/18 - 07 00                traZODone 150 mg tablet   Commonly known as: DESYREL   Take 150 mg by mouth daily at bedtime   Refills: 0   Next Dose Due: 2/8/18 - 22 00                       Allergies  No Known Allergies            Procedures Performed/Pertinent Test results         Results for orders placed or performed during the hospital encounter of 02/05/18   MRSA culture   Result Value Ref Range     MRSA Culture Only           No Methicillin Resistant Staphlyococcus aureus (MRSA) isolated   Culture, tissue and Gram stain   Result Value Ref Range     Gram Stain Result No polys seen       Gram Stain Result No bacteria seen     CBC (With Platelets)   Result Value Ref Range     WBC 7 00 4 80 - 10 80 Thousand/uL     RBC 2 56 (L) 4 70 - 6 10 Million/uL     Hemoglobin 7 1 (L) 14 0 - 18 0 g/dL     Hematocrit 21 8 (L) 42 0 - 52 0 %     MCV 85 82 - 98 fL     MCH 27 8 27 0 - 31 0 pg     MCHC 32 7 31 4 - 37 4 g/dL     RDW 14 2 11 6 - 15 1 %     Platelets 823 564 - 589 Thousands/uL     MPV 7 7 (L) 8 9 - 12 7 fL   Basic metabolic panel   Result Value Ref Range     Sodium 139 136 - 145 mmol/L     Potassium 3 1 (L) 3 5 - 5 3 mmol/L     Chloride 104 100 - 108 mmol/L     CO2 28 21 - 32 mmol/L     Anion Gap 7 4 - 13 mmol/L     BUN 54 (H) 5 - 25 mg/dL     Creatinine 2 52 (H) 0 60 - 1 30 mg/dL     Glucose 110 65 - 140 mg/dL     Calcium 7 5 (L) 8 3 - 10 1 mg/dL     eGFR 24 ml/min/1 73sq m   Basic metabolic panel   Result Value Ref Range     Sodium 136 136 - 145 mmol/L     Potassium 4 4 3 5 - 5 3 mmol/L     Chloride 103 100 - 108 mmol/L     CO2 25 21 - 32 mmol/L     Anion Gap 8 4 - 13 mmol/L     BUN 54 (H) 5 - 25 mg/dL     Creatinine 2 46 (H) 0 60 - 1 30 mg/dL     Glucose 102 65 - 140 mg/dL     Glucose, Fasting 102 (H) 65 - 99 mg/dL     Calcium 8 4 8 3 - 10 1 mg/dL     eGFR 25 ml/min/1 73sq m   Magnesium   Result Value Ref Range     Magnesium 2 0 1 6 - 2 6 mg/dL   Phosphorus   Result Value Ref Range     Phosphorus 3 0 2 3 - 4 1 mg/dL   CBC (With Platelets)   Result Value Ref Range     WBC 9 30 4 80 - 10 80 Thousand/uL     RBC 3 22 (L) 4 70 - 6 10 Million/uL     Hemoglobin 9 3 (L) 14 0 - 18 0 g/dL     Hematocrit 28 5 (L) 42 0 - 52 0 %     MCV 89 82 - 98 fL     MCH 28 9 27 0 - 31 0 pg     MCHC 32 6 31 4 - 37 4 g/dL     RDW 14 5 11 6 - 15 1 %     Platelets 636 276 - 966 Thousands/uL     MPV 8 0 (L) 8 9 - 12 7 fL   Comprehensive metabolic panel   Result Value Ref Range     Sodium 139 136 - 145 mmol/L     Potassium 4 1 3 5 - 5 3 mmol/L     Chloride 104 100 - 108 mmol/L     CO2 26 21 - 32 mmol/L     Anion Gap 9 4 - 13 mmol/L     BUN 38 (H) 5 - 25 mg/dL     Creatinine 1 96 (H) 0 60 - 1 30 mg/dL     Glucose 96 65 - 140 mg/dL     Calcium 8 3 8 3 - 10 1 mg/dL     AST 20 5 - 45 U/L     ALT 15 12 - 78 U/L     Alkaline Phosphatase 56 46 - 116 U/L     Total Protein 7 6 6 4 - 8 2 g/dL     Albumin 2 9 (L) 3 5 - 5 0 g/dL     Total Bilirubin 0 30 0 20 - 1 00 mg/dL     eGFR 32 ml/min/1 73sq m   CBC   Result Value Ref Range     WBC 7 80 4 80 - 10 80 Thousand/uL     RBC 3 54 (L) 4 70 - 6 10 Million/uL     Hemoglobin 10 3 (L) 14 0 - 18 0 g/dL     Hematocrit 31 3 (L) 42 0 - 52 0 %     MCV 89 82 - 98 fL     MCH 29 1 27 0 - 31 0 pg     MCHC 32 8 31 4 - 37 4 g/dL     RDW 14 8 11 6 - 15 1 %     Platelets 586 029 - 538 Thousands/uL     MPV 7 9 (L) 8 9 - 12 7 fL   Magnesium   Result Value Ref Range     Magnesium 1 9 1 6 - 2 6 mg/dL   Phosphorus   Result Value Ref Range     Phosphorus 2 2 (L) 2 3 - 4 1 mg/dL   ECG 12 lead   Result Value Ref Range     Ventricular Rate 70 BPM     Atrial Rate 70 BPM     RI Interval 176 ms     QRSD Interval 188 ms     QT Interval 538 ms     QTC Interval 581 ms     P Maidens 57 degrees     QRS Axis -81 degrees     T Wave Axis 94 degrees   Type and screen   Result Value Ref Range     ABO Grouping A       Rh Factor Positive       Antibody Screen Negative       Specimen Expiration Date [id number]     Prepare RBC:Has consent been obtained? Yes, 2 Units   Result Value Ref Range     Unit Product Code V7465G48       Unit Number [id number]       Unit ABO A       Unit RH POS       Crossmatch Compatible       Unit Dispense Status Presumed Trans       Unit Product Code K7332K49       Unit Number [id number]       Unit ABO A       Unit RH POS       Crossmatch Compatible       Unit Dispense Status Presumed Trans     ABO/Rh   Result Value Ref Range     ABO Grouping A       Rh Factor Positive     Fingerstick Glucose (POCT)   Result Value Ref Range     POC Glucose 135 65 - 140 mg/dl   Fingerstick Glucose (POCT)   Result Value Ref Range     POC Glucose 99 65 - 140 mg/dl   Fingerstick Glucose (POCT)   Result Value Ref Range     POC Glucose 101 65 - 140 mg/dl      No orders to display            Diet restrictions: none  Activity restrictions: none     Discharge Condition: fair        Discharge  Statement   I spent 30 minutes discharging the patient  This time was spent on the day of discharge  I had direct contact with the patient on the day of discharge   Additional documentation is required if more than 30 minutes were spent on discharge           Cosigned by: Cate Almonte MD at 2/7/2018 10:16 PM   Revision History

## 2018-02-08 NOTE — DISCHARGE SUMMARY
[facility] Discharge Summary - Medical Rogelio Flores 69 y o  male MRN: [id number]    [address] [facility] [address] / Bed: 4 [place] 407/4 Nima Pagan Encounter: [id number]    BRIEF OVERVIEW  Admitting Provider: Montez Meyer MD  Discharge Provider: Susana Roper MD    Discharge To: Home/Self Care    Outpatient Follow-Up: 1) Appointment at [facility] with Dr Musa Lopez on 2/16/18 at 2:30 PM  2) Follow up with Dr Ann Ramirez within 1 week  Things to address at first follow up visit: 1) Repeat CBC and BMP to check Hgb and Cr levels 2) Pt's son would like to review patient's medication list and get refills for certain medications  He was unable to come to the hospital on discharge  Explained over the phone and addressed majority of questions, but it would be beneficial to review them in person as well  3) Held pt's home medications Lisinopril and Lasix due to LIVAN  Consider restarting outpatient  BPs were stable throughout stay  4) Pt's son is worried about pt's decreased appetite  Discussed with him that we can consider oral appetite stimulation medications at follow up visit if he continues not to eat  Labs and results pending at discharge: None    Admission Date: 2/5/2018     Discharge Date: 2/7/2018    Primary Discharge Diagnosis  Principal Problem:    Normocytic anemia  Active Problems:    Acute renal failure superimposed on stage 4 chronic kidney disease (HCC)    Esophageal cancer (HCC)    Atrial flutter with rapid ventricular response (HCC)    Hypothyroidism    Body mass index (BMI) of 45 0-49 9 in adult ([facility] [address] )    Cardiomy[facility] )    Chronic coronary artery disease    Mixed anxiety depressive disorder    Gastroesophageal reflux disease    Presence of cardiac pacemaker    Infected cyst of right shoulder  Resolved Problems:    * No resolved hospital problems   David Dela Cruz longterm STAY      HPI  Keri Fitzpatrick is a 68 yo M with PMH of esophageal cancer s/p esophageal stent placement, fib/flutter, chronic systolic and diastolic CHF, ischemic cardiomyopathy with pacemaker, hypertension, hyperlipidemia, hypothyroidism who presents today for work up of abnormal blood work  Pt has history of recently diagnosed esophageal cancer and follows Dr Ck Rich outpatient  Per Dr Garo Daniel note on 1/25/18, pt was going to begin chemotherapy with Paclitaxel and Carboplatin given during radiation  Today pt presented to the infusion center for placement of PICC line, blood work and initiation of chemo  Although after PICC line was placed and blood work was drawn, pt was noted to have Hgb of 7 9 which has decreased from last Hgb level done on 12/22/17 which was 11 8  Pt's baseline Hgb is ~11 2  A CMP also revealed that pt's Cr had gone up to 3 27 today when his last Cr on 12/22/17 was 1 72  Pt's baseline Cr is ~1 5  Pt is currently with his son's ex gf who cares for him occasionally who states that pt has had a decreased appetite lately and not been eating or drinking as much  He denies any other symptoms including shortness of breath, chest pain, bleeding per rectum, nausea/vomiting, fevers, chills, headaches, dizziness, blurry vision, fatigue         Hospital Course    New Onset Acute Normocytic Anemia: BL Hgb ~ 11 2  Hgb was 7 1-7 9 on arrival which required 2U PRBC transfusion overnight on 2/6/18  Hgb on day of discharge is stable and improved at 10 3 Unsure as to cause at this time  Pt was asymptomatic throughout stay  FOBT done in the ED negative  Pt denies bleeding per rectum or hematemesis  GI consulted and performed a colonoscopy which was negative  Anemia may be 2/2 LIVAN as below per Dr Ck Rich which has improved on discharge       LIVAN on CKD stage IV: BL Cr ~ 1 5  Cr was 3 27 on admission  Pt was given IVF with NS @ 125 cc/hr and improved to 1 96 on day of discharge  Possibly 2/2 dehydration   Due to improving Cr, nephrology was not consulted  Home medication Lasix held  Consider restarting at follow up visit if Cr level is stable       Esophageal Cancer: Pt had esophageal stent placed by Dr Karl Rosado (GI) on 1/11/18  He had seen Dr Olivier Burns (Heme-Onc) on 1/25/18 and it was decided that he would begin chemotherapy with Paclitaxel and Carboplatin, which he was unable to do due to abnormal lab work as above  Dr Olivier Burns was consulted and chemotherapy was held until values improved  Pt will follow up outpatient with Dr Olivier Burns for further management       Recurrent Abscess/Cyst of Right Shoulder s/p I&D on 2/6/18: Pt completed 7 day course of Clindamycin 300mg 4x daily  He went to the OR to have I&D of abscess by Dr Carl Phan while inpatient  Procedure went well without any complications       Hx of A  Fib/A  Flutter: stable, continued on Metoprolol and Amiodarone  Xarelto was initially held for R shoulder abscess I&D and colonoscopy  He was monitored on telemetry and remained in NSR      Hx of Combined Systolic and Diastolic CHF: Last echo done on 8/31/17 showed EF of 45-50%  Pt did not appear to be fluid over loaded  Home Lasix held due to LIVAN from possible dehydration      Hx of Ischemic Cardiomyopathy with Pacemaker: stable  Pt follows outpatient with cardiologist Dr Chaudhry Coil  Continued on ASA       Hypertension: stable, continued on Metoprolol  Lisinopril held due to LIVAN  Consider restarting outpatient if pt's Cr is stable       Hyperlipidemia: stable, continued on Pravastatin       Hypothyroidism: Last TSH done 11/25/17 was 7 178  Continued on Levothyroxine       GERD: stable, continued on Carafate       Depression: Continued on Sertraline and Trazodone       Medications    amiodarone 200 mg tablet   Take 1 tablet by mouth 2 (two) times a day   Refills: 0   Next Dose Due: 2/8/18 - 09 00            ASPIRIN LOW DOSE 81 MG tablet   Generic drug: aspirin   Take by mouth   Refills: 0   Next Dose Due: 2/8/18 - 09 00 clindamycin 300 MG capsule   Commonly known as: CLEOCIN   Take 1 capsule (300 mg total) by mouth 4 (four) times a day for 7 days   Refills: 0          levothyroxine 25 mcg tablet   Take 25 mcg by mouth daily   Refills: 0   Next Dose Due: 2/8/18 - 07 00           metoprolol succinate 50 mg 24 hr tablet   Commonly known as: TOPROL-XL   Take 50 mg by mouth daily Takes 3 tabs   Refills: 0   Next Dose Due: 2/8/18 - 09 00           multivitamin tablet   Take 1 tablet by mouth daily   Refills: 0   Next Dose Due: 2/8/18 - 09 00           omeprazole 40 MG capsule   Commonly known as: PriLOSEC   Take by mouth daily   Refills: 0   Next Dose Due: 2/8/18 - 07 00           pravastatin 80 mg tablet   Commonly known as: PRAVACHOL   Take by mouth daily   Refills: 0   Next Dose Due: 2/8/18 - 18 00           promethazine 12 5 mg/10 mL syrup   Commonly known as: PHENERGAN   Take 10 mL by mouth 3 (three) times a day   Refills: 0   Next Dose Due: 2/8/18 - 09 00             rivaroxaban 20 mg tablet   Commonly known as: XARELTO   Take 1 tablet by mouth daily with breakfast   Refills: 1   What changed: additional instructions   Next Dose Due: 2/8/18 - 09 00           sertraline 50 mg tablet   Commonly known as: ZOLOFT   Take 50 mg by mouth daily   Refills: 0   Next Dose Due: 2/8/18 - 09 00           sucralfate 1 g/10 mL suspension   Commonly known as: CARAFATE   Take 10 mL by mouth 4 (four) times a day (before meals and at bedtime)   Refills: 0   Next Dose Due: 2/8/18 - 07 00              traZODone 150 mg tablet   Commonly known as: DESYREL   Take 150 mg by mouth daily at bedtime   Refills: 0   Next Dose Due: 2/8/18 - 22 00               Allergies  No Known Allergies     Procedures Performed/Pertinent Test results  Results for orders placed or performed during the hospital encounter of 02/05/18   MRSA culture   Result Value Ref Range    MRSA Culture Only       No Methicillin Resistant Staphlyococcus aureus (MRSA) isolated   Culture, tissue and Gram stain   Result Value Ref Range    Gram Stain Result No polys seen     Gram Stain Result No bacteria seen    CBC (With Platelets)   Result Value Ref Range    WBC 7 00 4 80 - 10 80 Thousand/uL    RBC 2 56 (L) 4 70 - 6 10 Million/uL    Hemoglobin 7 1 (L) 14 0 - 18 0 g/dL    Hematocrit 21 8 (L) 42 0 - 52 0 %    MCV 85 82 - 98 fL    MCH 27 8 27 0 - 31 0 pg    MCHC 32 7 31 4 - 37 4 g/dL    RDW 14 2 11 6 - 15 1 %    Platelets 739 741 - 172 Thousands/uL    MPV 7 7 (L) 8 9 - 12 7 fL   Basic metabolic panel   Result Value Ref Range    Sodium 139 136 - 145 mmol/L    Potassium 3 1 (L) 3 5 - 5 3 mmol/L    Chloride 104 100 - 108 mmol/L    CO2 28 21 - 32 mmol/L    Anion Gap 7 4 - 13 mmol/L    BUN 54 (H) 5 - 25 mg/dL    Creatinine 2 52 (H) 0 60 - 1 30 mg/dL    Glucose 110 65 - 140 mg/dL    Calcium 7 5 (L) 8 3 - 10 1 mg/dL    eGFR 24 ml/min/1 73sq m   Basic metabolic panel   Result Value Ref Range    Sodium 136 136 - 145 mmol/L    Potassium 4 4 3 5 - 5 3 mmol/L    Chloride 103 100 - 108 mmol/L    CO2 25 21 - 32 mmol/L    Anion Gap 8 4 - 13 mmol/L    BUN 54 (H) 5 - 25 mg/dL    Creatinine 2 46 (H) 0 60 - 1 30 mg/dL    Glucose 102 65 - 140 mg/dL    Glucose, Fasting 102 (H) 65 - 99 mg/dL    Calcium 8 4 8 3 - 10 1 mg/dL    eGFR 25 ml/min/1 73sq m   Magnesium   Result Value Ref Range    Magnesium 2 0 1 6 - 2 6 mg/dL   Phosphorus   Result Value Ref Range    Phosphorus 3 0 2 3 - 4 1 mg/dL   CBC (With Platelets)   Result Value Ref Range    WBC 9 30 4 80 - 10 80 Thousand/uL    RBC 3 22 (L) 4 70 - 6 10 Million/uL    Hemoglobin 9 3 (L) 14 0 - 18 0 g/dL    Hematocrit 28 5 (L) 42 0 - 52 0 %    MCV 89 82 - 98 fL    MCH 28 9 27 0 - 31 0 pg    MCHC 32 6 31 4 - 37 4 g/dL    RDW 14 5 11 6 - 15 1 %    Platelets 263 379 - 791 Thousands/uL    MPV 8 0 (L) 8 9 - 12 7 fL   Comprehensive metabolic panel   Result Value Ref Range    Sodium 139 136 - 145 mmol/L    Potassium 4 1 3 5 - 5 3 mmol/L    Chloride 104 100 - 108 mmol/L    CO2 26 21 - 32 mmol/L Anion Gap 9 4 - 13 mmol/L    BUN 38 (H) 5 - 25 mg/dL    Creatinine 1 96 (H) 0 60 - 1 30 mg/dL    Glucose 96 65 - 140 mg/dL    Calcium 8 3 8 3 - 10 1 mg/dL    AST 20 5 - 45 U/L    ALT 15 12 - 78 U/L    Alkaline Phosphatase 56 46 - 116 U/L    Total Protein 7 6 6 4 - 8 2 g/dL    Albumin 2 9 (L) 3 5 - 5 0 g/dL    Total Bilirubin 0 30 0 20 - 1 00 mg/dL    eGFR 32 ml/min/1 73sq m   CBC   Result Value Ref Range    WBC 7 80 4 80 - 10 80 Thousand/uL    RBC 3 54 (L) 4 70 - 6 10 Million/uL    Hemoglobin 10 3 (L) 14 0 - 18 0 g/dL    Hematocrit 31 3 (L) 42 0 - 52 0 %    MCV 89 82 - 98 fL    MCH 29 1 27 0 - 31 0 pg    MCHC 32 8 31 4 - 37 4 g/dL    RDW 14 8 11 6 - 15 1 %    Platelets 186 983 - 183 Thousands/uL    MPV 7 9 (L) 8 9 - 12 7 fL   Magnesium   Result Value Ref Range    Magnesium 1 9 1 6 - 2 6 mg/dL   Phosphorus   Result Value Ref Range    Phosphorus 2 2 (L) 2 3 - 4 1 mg/dL   ECG 12 lead   Result Value Ref Range    Ventricular Rate 70 BPM    Atrial Rate 70 BPM    NC Interval 176 ms    QRSD Interval 188 ms    QT Interval 538 ms    QTC Interval 581 ms    P [place] 57 degrees    QRS Axis -81 degrees    T Wave Axis 94 degrees   Type and screen   Result Value Ref Range    ABO Grouping A     Rh Factor Positive     Antibody Screen Negative     Specimen Expiration Date [id number]    Prepare RBC:Has consent been obtained?  Yes, 2 Units   Result Value Ref Range    Unit Product Code D8401W27     Unit Number [id number]     Unit ABO A     Unit [facility] POS     Crossmatch Compatible     Unit Dispense Status Presumed Trans     Unit Product Code N4547O32     Unit Number [id number]     Unit ABO A     Unit RH POS     Crossmatch Compatible     Unit Dispense Status Presumed Trans    ABO/Rh   Result Value Ref Range    ABO Grouping A     Rh Factor Positive    Fingerstick Glucose (POCT)   Result Value Ref Range    POC Glucose 135 65 - 140 mg/dl   Fingerstick Glucose (POCT)   Result Value Ref Range    POC Glucose 99 65 - 140 mg/dl   Fingerstick Glucose (POCT)   Result Value Ref Range    POC Glucose 101 65 - 140 mg/dl     No orders to display         Diet restrictions: none  Activity restrictions: none    Discharge Condition: fair      Discharge  Statement   I spent 30 minutes discharging the patient  This time was spent on the day of discharge  I had direct contact with the patient on the day of discharge  Additional documentation is required if more than 30 minutes were spent on discharge

## 2018-02-08 NOTE — CASE MANAGEMENT
Notification of Discharge  This is a Notification of Discharge from our facility [address]  Please be advised that this patient has been discharge from our facility  Below you will find the admission and discharge date and time including the patients disposition  PRESENTATION DATE: 2/5/2018 11:23 AM  IP ADMISSION DATE: 2/6/18 1145  DISCHARGE DATE: 2/7/2018  8:19 PM  DISPOSITION: Home/Self Care     PER MD  Doing well s/p colonoscopy  No complaints  VSS  Brennon Baize Cr improving  HGB stable  Colonoscopy revealed no abnl  Lungs clear  Heart RRR  Abd cheng Abdalla for d/c  [facility] in the [facility] by Gaetano Stovall for 2017  Network Utilization Review Department  Phone: [phone]; Fax [phone]  ATTENTION: The Network Utilization Review Department is now centralized for our 7 Facilities  Make a note that we have a new phone and fax numbers for our Department  Please call with any questions or concerns to [phone] and carefully follow the prompts so that you are directed to the right person  All voicemails are confidential  Fax any determinations, approvals, denials, and requests for initial or continue stay review clinical to [phone]  Due to HIGH CALL volume, it would be easier if you could please send faxed requests to expedite your requests and in part, help us provide discharge notifications faster

## 2018-02-08 NOTE — PROGRESS NOTES
Assessment/Plan:  Nayla Smith comes today for packing change  He had a large abscess drained from his right shoulder in the hospital   The packing was removed  New packing was inserted today with one-quarter-inch iodoform packing  I will check him in two weeks  He will come next week for packing change  He will call sooner for any problems  Diagnoses and all orders for this visit:    Change or removal of wound packing    Encounter for recheck of abscess following incision and drainage          Subjective:     Patient ID: Sindy Morales is a 69 y o  male  HPI Nayla Smith comes today for packing change  He had a large abscess drained from his right shoulder  Packing was placed  He was discharged from the hospital last night    Review of Systems   Skin: Negative  Drainage of abscess of right shoulder         Objective:     Physical Exam   Skin:   Wound clean  Granulating well  Packing removed  New one-quarter-inch iodoform packing inserted

## 2018-02-08 NOTE — NURSING NOTE
Pt d/c home  AVS discussed with patient and Jenna Pa (family friend)  Education and demonstration flushing PICC line provided

## 2018-02-10 LAB
BACTERIA TISS AEROBE CULT: ABNORMAL
GRAM STN SPEC: ABNORMAL
GRAM STN SPEC: ABNORMAL

## 2018-02-11 LAB
BACTERIA SPEC ANAEROBE CULT: ABNORMAL
BACTERIA SPEC ANAEROBE CULT: ABNORMAL

## 2018-02-12 ENCOUNTER — HOSPITAL ENCOUNTER (OUTPATIENT)
Dept: INFUSION CENTER | Facility: HOSPITAL | Age: 78
Discharge: HOME | End: 2018-02-12
Payer: COMMERCIAL

## 2018-02-12 DIAGNOSIS — C15.5 MALIGNANT NEOPLASM OF LOWER THIRD OF ESOPHAGUS (HCC): Primary | ICD-10-CM

## 2018-02-12 PROCEDURE — 99211 OFF/OP EST MAY X REQ PHY/QHP: CPT

## 2018-02-12 RX ORDER — 0.9 % SODIUM CHLORIDE 0.9 %
10 VIAL (ML) INJECTION DAILY
Qty: 10 ML | Refills: 0 | Status: SHIPPED | OUTPATIENT
Start: 2018-02-12 | End: 2018-03-14

## 2018-02-12 NOTE — MISCELLANEOUS
Message   Recorded as Task   Date: 01/23/2018 09:17 AM, Created By: Jason Gutierrez   Task Name: Care Coordination   Assigned To: Jason Gutierrez   Regarding Patient: Sabrina Mejia, Status: Active   Comment:    Lizeth Toledo - 23 Jan 2018 9:17 AM     TASK CREATED  pt needs a f/u appt scheduled to discuss beginning chemo  thanks  Cici Gonzales - 23 Jan 2018 4:25 PM     TASK EDITED  left a voice message that we zuhair the pt for a dr Disha Keith f/u appt to discuss beginning chemo, he is zuhair for 1/25/18 @ 1140am  pt to call to verify or if this is not siutable        Active Problems    1  Abdominal hernia (553 9) (K46 9)   2  Abscess of back (682 2) (L02 212)   3  Anxiety disorder (300 00) (F41 9)   4  Atrial flutter (427 32) (I48 92)   5  Benign essential hypertension (401 1) (I10)   6  BMI 40 0-44 9, adult (V85 41) (Z68 41)   7  Bursitis of left shoulder (726 10) (M75 52)   8  Cardiomyopathy (425 4) (I42 9)   9  Chronic combined systolic and diastolic congestive heart failure (428 42,428 0) (I50 42)   10  CKD (chronic kidney disease), stage III (585 3) (N18 3)   11  Colon cancer screening (V76 51) (Z12 11)   12  Coronary artery disease (414 00) (I25 10)   13  Cubital tunnel syndrome on left (354 2) (G56 22)   14  Depression with anxiety (300 4) (F41 8)   15  Diabetes mellitus screening (V77 1) (Z13 1)   16  Diastolic heart failure, NYHA class 1 (428 30) (I50 30)   17  Difficulty swallowing (787 20) (R13 10)   18  Elevated TSH (794 5) (R94 6)   19  Esophageal cancer (150 9) (C15 9)   20  Esophageal dysmotility (530 5) (K22 4)   21  Esophageal mass (530 9) (K22 9)   22  Esophageal reflux (530 81) (K21 9)   23  GERD (gastroesophageal reflux disease) (530 81) (K21 9)   24  Gynecomastia, male (611 1) (N62)   22  Hand pain (729 5) (M79 643)   26  Hand tingling (782 0) (R20 2)   27  Hyperkalemia (276 7) (E87 5)   28  Hypertensive Renal Sclerosis (403 90)   29   ICD (implantable cardioverter-defibrillator) in place (V45 02) (Z95 810)   30  Iliotibial band syndrome, left leg (728 89) (M76 32)   31  Insomnia (780 52) (G47 00)   32  Localized, primary osteoarthritis of lower leg, unspecified laterality   33  Mixed hyperlipidemia (272 2) (E78 2)   34  Morbid obesity (278 01) (E66 01)   35  Muscle twitching (781 0) (R25 3)   36  Need for DTaP, hepatitis B, and IPV vaccination (V06 8) (Z23)   37  Need for influenza vaccination (V04 81) (Z23)   38  Need for pneumococcal vaccination (V03 82) (Z23)   39  Need for vaccination with 13-polyvalent pneumococcal conjugate vaccine (V03 82) (Z23)   40  Pacemaker (V45 01) (Z95 0)   41  Peripheral edema (782 3) (R60 9)   42  Prediabetes (790 29) (R73 03)   43  Screening for neurological condition (V80 09) (Z13 89)   44  Subclinical hypothyroidism (244 8) (E03 9)   45  Thyroid disorder screening (V77 0) (Z13 29)   46  Transition of care    Current Meds   1  Amiodarone HCl - 200 MG Oral Tablet; TAKE 1 TABLET TWICE DAILY  Requested for:   49HYO2746; Last Rx:16Jan2018; Status: ACTIVE - Renewal Denied Ordered   2  Aspirin 81 MG TABS; TAKE 1 TABLET DAILY; Therapy: (Recorded:19Nti2031) to Recorded   3  Carafate 1 GM/10ML Oral Suspension; TAKE 10 ML 4 TIMES DAILY  Requested for:   94Cqh4546; Last Rx:68Tuh2358 Ordered   4  Daily Multivitamin TABS; TAKE 1 TABLET DAILY; Therapy: (Recorded:31Kxz7512) to Recorded   5  Lasix 40 MG Oral Tablet (Furosemide); TAKE 1 TABLET DAILY; Therapy: (Recorded:74Ham2251) to Recorded   6  Levothyroxine Sodium 25 MCG Oral Tablet (Synthroid); TAKE ONE TABLET BY MOUTH   ONCE DAILY; Therapy: 45Juj3336 to (Evaluate:02Jan2018)  Requested for: 44VLK2803; Last   Rx:04Oct2017 Ordered   7  Lisinopril 5 MG Oral Tablet; TAKE 1 TABLET DAILY; Therapy: 89XVC2151 to (Evaluate:47Kch3945)  Requested for: 70CJK7164; Last   Rx:12Jan2018 Ordered   8  Metoprolol Succinate ER 50 MG Oral Tablet Extended Release 24 Hour; TAKE THREE   TABLETS BY MOUTH ONCE DAILY;    Therapy: 82RVT0761 to [phone])  Requested for: 24Oct2017; Last   Rx:04Oct2017 Ordered   9  Omega-3-acid Ethyl Esters 1 GM Oral Capsule (Lovaza); TAKE ONE CAPSULE BY   MOUTH TWICE DAILY; Therapy: 51Jfn7715 to (Shermon Rising)  Requested for: 79YPP9068; Last   Rx:23Jan2017 Ordered   10  Omeprazole 40 MG Oral Capsule Delayed Release; take 1 capsule by mouth twice a    day; Therapy: 51HXR2238 to (Evaluate:11Nrv7733)  Requested for: 09QNY9449; Last    Rx:11Jan2018 Ordered   11  Pravastatin Sodium 80 MG Oral Tablet; TAKE ONE TABLET BY MOUTH ONCE DAILY; Therapy: 16TYW4792 to [phone])  Requested for: 24Oct2017; Last    Rx:04Oct2017 Ordered   12  Promethazine HCl - 6 25 MG/5ML Oral Syrup; TAKE 10 ML 3 times daily; Therapy: 30LKB0988 to (KQBRQQSP:96IYQ8017)  Requested for: 62QFQ3514; Last    Rx:11Jan2018 Ordered   13  Sertraline HCl - 50 MG Oral Tablet (Zoloft); TAKE ONE TABLET BY MOUTH ONCE DAILY; Therapy: 16SXB6840 to (Evaluate:78Mdl5982)  Requested for: 24Oct2017; Last    Rx:04Oct2017 Ordered   14  Sucralfate 1 GM Oral Tablet (Carafate); TAKE 1 TABLET 4 times daily 1 hour after meals; Therapy: 50ERF6872 to (Last Rx:31Lvq4646)  Requested for: 91OCT6458 Ordered   15  TraZODone HCl - 150 MG Oral Tablet; TAKE ONE TABLET BY MOUTH AT BEDTIME; Therapy: 85XRU4100 to (Evaluate:49Nto9802)  Requested for: 46YEI1724; Last    Rx:12Jan2018 Ordered   16  Xarelto 20 MG Oral Tablet; Take 1 tablet daily with breakfast  Requested for: 94FBU6099; Last Rx:04Oct2017 Ordered    Allergies    1  No Known Drug Allergies    2   No Known Latex Allergies    Signatures   Electronically signed by : Kassandra Cho RN; Jan 24 2018  8:41AM EST                       (Author)

## 2018-02-12 NOTE — PROGRESS NOTES
PT RECEIVED AMBULATORY WITH DAUGHTER  PT THOUGHT HE HAD APPT TODAY FOR CHEMOTHERAPY  PT HAD BEEN HOSPITALIZED AND WAS NOT SUPPOSED TO START CHEMO  UNTIL PT HAS SPOKEN WITH DR Nya Griffin  PICC LINE DRESSING DUE TO BE CHANGED TODAY

## 2018-02-14 ENCOUNTER — TELEPHONE (OUTPATIENT)
Dept: HEMATOLOGY ONCOLOGY | Facility: MEDICAL CENTER | Age: 78
End: 2018-02-14

## 2018-02-19 ENCOUNTER — HOSPITAL ENCOUNTER (OUTPATIENT)
Dept: INFUSION CENTER | Facility: HOSPITAL | Age: 78
Discharge: HOME | End: 2018-02-19
Payer: COMMERCIAL

## 2018-02-19 ENCOUNTER — TRANSCRIBE ORDERS (OUTPATIENT)
Dept: ADMINISTRATIVE | Facility: HOSPITAL | Age: 78
End: 2018-02-19

## 2018-02-19 ENCOUNTER — CLINICAL SUPPORT (OUTPATIENT)
Dept: CARDIOLOGY CLINIC | Facility: CLINIC | Age: 78
End: 2018-02-19
Payer: COMMERCIAL

## 2018-02-19 DIAGNOSIS — Z95.810 PRESENCE OF IMPLANTABLE CARDIOVERTER-DEFIBRILLATOR (ICD): ICD-10-CM

## 2018-02-19 DIAGNOSIS — I50.32 CHRONIC DIASTOLIC CONGESTIVE HEART FAILURE (HCC): Primary | ICD-10-CM

## 2018-02-19 PROCEDURE — 93284 PRGRMG EVAL IMPLANTABLE DFB: CPT | Performed by: INTERNAL MEDICINE

## 2018-02-19 PROCEDURE — 99211 OFF/OP EST MAY X REQ PHY/QHP: CPT

## 2018-02-19 NOTE — PROGRESS NOTES
DEVICE INTERROGATED IN THE [facility] OFFICE:  BATTERY VOLTAGE ADEQUATE (2 9 YR)   AP 64 2% BP 98 6% (VSRP 0 2%)   ALL LEAD PARAMETERS WITHIN NORMAL LIMITS   NO SIGNIFICANT HIGH RATE EPISODES    6,830 V SENSE EPISODES (15 MIN/D) FOR ST ~ 110 - 120 BPM (AR/VS)   NO PROGRAMMING CHANGES MADE TO DEVICE PARAMETERS   NORMAL DEVICE FUNCTION   RG

## 2018-02-20 NOTE — CASE MANAGEMENT
Prior Authorization Request    Patient Information:  PATIENT NAME: Jaxon Valdovinos  MRN: [id number]  YOB: 1940    PRESENTATION DATE/TIME: 11/25/2017  6:58 PM  OBS ADMISSION DATE/TIME: 11/25/2017 8:09 PM  DISCHARGE DATE/TIME: 11/29/2017  4:16 PM   DISPOSITION: Home/Self Care    Attending Physician: Egbert Gilford [facility] ID- [id number]  [address] [facility]  [place]  Phone 1: [phone]  Fax: [phone]    Facility:  [place] [address]  Address: [address] [place] [address]  Phone: [phone] Tax ID: [id number]  NPI: [id number]    Ruthann Cormier MD Physician Signed Cardiology Procedures Date of Service: 11/28/2017  1:03 PM      Procedures  Cardiac Catheterization Operative Report     Zaira Ji  [id number]  11/28/2017  Angie Arroyo DO     Indication: Cardiomyopathy  Procedure: L Heart Catheterization                     Ventriculogram                     Coronary Angiogram     Procedure Details  The risks, benefits, complications, treatment options, and expected outcomes were discussed with the patient  The patient and/or family concurred with the proposed plan, giving informed consent  Patient was brought to the cath lab after IV hydration was begun and oral premedication was given  He was further sedated with fentanyl and midazolam  He was prepped and draped in the usual manner  Using the modified Seldinger access technique, a 5 Vietnamese sheath was placed in the radial artery  Heparin was administered  A left heart catheterization and ventriculogram was completed with a JR4 catheter after the JR4 catheter across the aortic valve  There was difficulty in cannulating the right coronary unable to cannulate with a JR4, JR3 5, and engagement successfully with a AR1    A tiger catheter was used to cannulate the left coronary artery        After the procedure was completed, sedation was stopped and the sheaths and catheters were all removed  Hemostasis was achieved with manual pressure      Findings:     Hemodynamics   LVEDP 19mmHg, no gradient on pullback   Left Main  short caliber widely patent   RCA   30% proximal disease   LAD   Mild luminal regularities   Circ    50-60%% mid-cx    LV  Normal LV size, Mild diffuse hypokinesis 40-45% grossly         Complications  None      Estimated Blood Loss:  Minimal         Complications:  None; patient tolerated the procedure well  Disposition: hemodynamically stable and PACU - hemodynamically stable           Condition: stable     A/P Mild- moderate diffuse disease including 50-60% cx disease         Mildly elevated filling pressures  -- TR band 2 hours       [address] [facility] in the [facility] [address] for 2017  Network Utilization Review Department  Phone: [phone]; Fax [phone]  ATTENTION: The Network Utilization Review Department is now centralized for our 7 Facilities  Make a note that we have a new phone and fax numbers for our Department  Please call with any questions or concerns to [phone] and carefully follow the prompts so that you are directed to the right person  All voicemails are confidential  Fax any determinations, approvals, denials, and requests for initial or continue stay review clinical to [phone]  Due to HIGH CALL volume, it would be easier if you could please send faxed requests to expedite your requests and in part, help us provide discharge notifications faster

## 2018-02-23 LAB — SCAN RESULT: NORMAL

## 2018-02-27 ENCOUNTER — OFFICE VISIT (OUTPATIENT)
Dept: SURGERY | Facility: CLINIC | Age: 78
End: 2018-02-27
Payer: COMMERCIAL

## 2018-02-27 ENCOUNTER — TRANSCRIBE ORDERS (OUTPATIENT)
Dept: ADMINISTRATIVE | Facility: HOSPITAL | Age: 78
End: 2018-02-27

## 2018-02-27 VITALS
BODY MASS INDEX: 38.45 KG/M2 | DIASTOLIC BLOOD PRESSURE: 82 MMHG | TEMPERATURE: 98.3 F | SYSTOLIC BLOOD PRESSURE: 162 MMHG | HEIGHT: 67 IN | HEART RATE: 72 BPM | WEIGHT: 245 LBS

## 2018-02-27 DIAGNOSIS — L72.3 SEBACEOUS CYST: ICD-10-CM

## 2018-02-27 DIAGNOSIS — Z51.89 ENCOUNTER FOR WOUND CARE: Primary | ICD-10-CM

## 2018-02-27 PROCEDURE — 99213 OFFICE O/P EST LOW 20 MIN: CPT | Performed by: SPECIALIST

## 2018-02-27 NOTE — PROGRESS NOTES
Assessment/Plan:   Shellie Perez comes today to check his wound  An abscess drained on February 6, 2018 from his right back and shoulder region  Packing is out it is healing well  Scabs present  It is closed  He can continue clean this were peroxide  I will check him in one month at which time we will plan excision of the cyst    Diagnoses and all orders for this visit:    Encounter for wound care    Sebaceous cyst          Subjective:     Patient ID: Debbie Kohli is a 69 y o  male  Preop comes today to check the abscess of his right back  This was drained on February 6, 2018  Dialysis at this time  He is cleaning of this was peroxide twice a day  The packing is come out  Review of Systems   Skin:        Status post incision and drainage of abscess of his right back and shoulder area         Objective:     Physical Exam   Skin:   Healed well  Wound clean  Packing is out  Scabs present    Areas closing well

## 2018-03-07 NOTE — PROGRESS NOTES
"  Discussion/Summary  Normal device function      Results/Data  Cardiac Device Remote [id number] 01:02PM Radu Leo     Test Name Result Flag Reference   MISCELLANEOUS COMMENT      CARELINK TRANSMISSION - OPTI-VOL ONLY: X0AOPTI-VOL WITHIN NORMAL LIMITS  AP 61% BVP 99%  ALL AVAILABLE LEAD PARAMETERS WITHIN NORMAL LIMITS  NO SIGNIFICANT HIGH RATE EPISODES  VENTRICULAR SENSE EPISODES  NORMAL DEVICE FUNCTION  ---ORELLANA   Cardiac Electrophysiology Report      [id number]  pdf   DEVICE TYPE CRT-D       Cardiac Electrophysiology Report [id number] 01:02PM Jatinder Montesinos     Test Name Result Flag Reference   Cardiac Electrophysiology Report      [id number]  pdf     Signatures   Electronically signed by : Shikha Simon, ; Jun 23 2017  8:30AM EST                       (Author)    Electronically signed by : Leonela Contreras DO; Jun 30 2017 11:14AM EST                       (Author)    "

## 2018-03-07 NOTE — PROGRESS NOTES
"  Discussion/Summary  Normal device function and Abnormal Optivol/Corvue     Optivol thresholds crossed  Will recheck in 1 month  Results/Data  Cardiac Device Remote [id number] 08:37AM Jesus Crum     Test Name Result Flag Reference   MISCELLANEOUS COMMENT (Report)     CARELINK TRANSMISSION: BBATTERY VOLTAGE ADEQUATE  (5 4 YRS)  AP 76% BVP 99%  ALL AVAILABLE LEAD PARAMETERS WITHIN NORMAL LIMITS  NO SIGNIFICANT HIGH RATE EPISODES  VENTRICULAR SENSE EPISODES DETECTED  OPTI-VOL FLUID THRESHOLD CROSSED  RECHECK IN ONE MONTH  NORMAL DEVICE FUNCTION  ---ORELLANA   Cardiac Electrophysiology Report      [id number]  pdf   DEVICE TYPE CRT-D       Signatures   Electronically signed by : Josy Pink, ; Apr 10 2017  8:46AM EST                       (Author)    Electronically signed by : Jose Antonio Bañuelos DO;  Apr 18 2017  9:37AM EST                       (Author)    "

## 2018-03-07 NOTE — PROGRESS NOTES
"  Discussion/Summary  Normal device function      Results/Data  Cardiac Device Remote [id number] 02:50PM Radu Leo     Test Name Result Flag Reference   MISCELLANEOUS COMMENT (Report)     CARELINK TRANSMISSION: BATTERY VOLTAGE ADEQUATE (4 6 YRS)  AP 73 6% BVP 98 3% + VSRP 0 3% = 98 6%  ALL AVAILABLE LEAD PARAMETERS WITHIN NORMAL LIMITS  NO SIGNIFICANT HIGH RATE EPISODES  OPTI-VOL WITHIN NORMAL LIMITS  1395 VSENSE EPISODES (11 MIN/DAY) WITH MARKERS SHOWING PAT (118-125 BPM)  APPROPRIATELY FUNCTIONING BIV ICD  CP   Cardiac Electrophysiology Report      [id number]  pdf   DEVICE TYPE CRT-D       Cardiac Electrophysiology Report [id number] 02:50PM Lisa Yuan     Test Name Result Flag Reference   Cardiac Electrophysiology Report      [id number]  pdf     Signatures   Electronically signed by : Rosalino Hollingsworth, ; Nov 24 2017  9:18AM EST                       (Author)    Electronically signed by : Elyse Browne DO; Nov 24 2017  5:29PM EST                       (Author)    "

## 2018-03-07 NOTE — PROGRESS NOTES
"  Discussion/Summary  Normal device function and Normal Optival/Corvue      Results/Data  Cardiac Device Remote [id number] 03:55PM Radu Leo     Test Name Result Flag Reference   MISCELLANEOUS COMMENT      CARELINK TRANSMISSION - OPTI-VOL ONLY: OPTI-VOL WITHIN NORMAL LIMITS  AP AND BVP 99%  ALL AVAILABLE LEAD PARAMETERS WITHIN NORMAL LIMITS  NO SIGNIFICANT HIGH RATE EPISODES  NORMAL DEVICE FUNCTION  ---ORELLANA   Cardiac Electrophysiology Report      [id number]  pdf   DEVICE TYPE CRT-D       Cardiac Electrophysiology Report [id number] 03:55PM Tamika Azevedo     Test Name Result Flag Reference   Cardiac Electrophysiology Report      [id number]  pdf     Signatures   Electronically signed by : Bernice Archer, ; Jan 20 2017 12:59PM EST                       (Author)    Electronically signed by : Richard Álvarez DO; Jan 24 2017  9:46PM EST                       (Author)    "

## 2018-03-07 NOTE — PROGRESS NOTES
"  Discussion/Summary  Normal device function and Normal Optival/Corvue      Results/Data  Cardiac Device Remote [id number] 02:46PM Urvashi Leochristiano     Test Name Result Flag Reference   MISCELLANEOUS COMMENT      CARELINK TRANSMISSION - OPTI-VOL ONLY: X0AOPTI-VOL WITHIN NORMAL LIMITS  AP 60% BVP 99%  ALL AVAILABLE LEAD PARAMETERS WITHIN NORMAL LIMITS  NO SIGNIFICANT HIGH RATE EPISODES  VENTRICULAR SENSE EPISODES  NORMAL DEVICE FUNCTION  ---ORELLANA   Cardiac Electrophysiology Report      [id number]_[id number]  pdf   DEVICE TYPE CRT-D       Cardiac Electrophysiology Report [id number] 02:46PM Lauren Tangent     Test Name Result Flag Reference   Cardiac Electrophysiology Report      [id number]  pdf     Signatures   Electronically signed by : Kyra Mayorga, ; May 24 2017 12:50PM EST                       (Author)    Electronically signed by : Keli Metz DO; May 26 2017  5:41PM EST                       (Author)    "

## 2018-03-07 NOTE — PROGRESS NOTES
"  Results/Data  Cardiac Device In Clinic [id number] 07:59PM Dedrick Ohara     Test Name Result Flag Reference   MISCELLANEOUS COMMENT (Report)     DEVICE INTERROGATED IN THE [facility] OFFICE: BATTERY VOLTAGE ADEQUATE (6 3 YRS)  AP 85 3% BVP 99 8%  ALL LEAD PARAMETERS WITHIN NORMAL LIMITS  1 VT-NS EPISODE = NSVT - 11 BEATS @ 192 BPM  PT TAKES METOPROLOL SUCC  EF 50-55% (ECHO NOV 2105)  [address] 3 8 MIN = PAT AND SINUS TACH  NO OTHER SIGNIFICANT HIGH RATE EPISODES  OPTI-VOL WITHIN NORMAL LIMITS  NO PROGRAMMING CHANGES MADE TO DEVICE PARAMETERS  APPROPRIATELY FUNCTIONING BIV ICD  CP   Cardiac Electrophysiology Report      [id number] [id number]  pdf   DEVICE TYPE CRT-D       Cardiac Electrophysiology Report [id number] 07:59PM Dedrick Ohara     Test Name Result Flag Reference   Cardiac Electrophysiology Report      [id number]  pdf     Procedure      Procedure   Visit Type: In Office  Other Comments: Normal Device Function and Normal Optivol/Corvue  Comments: 1 episode of VT noted  Continue beta blocker        Signatures   Electronically signed by : Tad Oliva, ; Dec 19 2016  3:40PM EST                       (Author)    Electronically signed by : Shea Ingram DO; Dec 21 2016 12:11PM EST                       (Author)    "

## 2018-03-07 NOTE — PROGRESS NOTES
"  Discussion/Summary  Normal device function and Normal Optival/Corvue     99% Biventricular pacing  Continue current Rx  Results/Data  Cardiac Device Remote [id number] 07:28AM Bre Leija     Test Name Result Flag Reference   MISCELLANEOUS COMMENT      CARELINK TRANSMISSION - OPTI-VOL ONLY: OPTI-VOL WITHIN NORMAL LIMITS  AP 82% BVP 99%  ALL AVAILABLE LEAD PARAMETERS WITHIN NORMAL LIMITS  NO SIGNIFICANT HIGH RATE EPISODES  NORMAL DEVICE FUNCTION  ---ORELLANA   Cardiac Electrophysiology Report      [id number]_[id number]  pdf   DEVICE TYPE CRT-D       Cardiac Electrophysiology Report [id number] 07:28AM Bre Leija     Test Name Result Flag Reference   Cardiac Electrophysiology Report      [id number]  pdf     Signatures   Electronically signed by : Elissa Brito, ; Feb 24 2017  1:56PM EST                       (Author)    Electronically signed by : Mar Hector DO; Mar  2 2017  4:39PM EST                       (Author)    "

## (undated) DEVICE — ASTOUND IMPERVIOUS SURGICAL GOWN: Brand: CONVERTORS

## (undated) DEVICE — 1200CC GUARDIAN II: Brand: GUARDIAN

## (undated) DEVICE — GROUNDING PAD UNIVERSAL SLW

## (undated) DEVICE — BRUSH ENDO CLEANING DBL-HEADER

## (undated) DEVICE — 60 ML SYRINGE,REGULAR TIP: Brand: MONOJECT

## (undated) DEVICE — AIRLIFE™  ADULT CUSHION NASAL CANNULA WITH 7 FOOT (2.1 M) CRUSH-RESISTANT OXYGEN TUBING, AND U/CONNECT-IT ADAPTER: Brand: AIRLIFE™

## (undated) DEVICE — GUIDEWIRE ENDO DREAMWIRE 0.035 X 260CM STR SS

## (undated) DEVICE — SOLIDIFIER FLUID WASTE CONTROL 1500ML

## (undated) DEVICE — TUBING BUBBLE CLEAR 5MM X 100 FT NS

## (undated) DEVICE — CURITY IDOFORM PACKING STRIP: Brand: CURITY

## (undated) DEVICE — HIGH PERFORMANCE GUIDEWIRE: Brand: DREAMWIRE

## (undated) DEVICE — BITE BLOCK ENDO 60FR ADLT MAXI  DISP W/STRAP

## (undated) DEVICE — TIBURON SPLIT SHEET: Brand: CONVERTORS

## (undated) DEVICE — MEDI-VAC YANKAUER SUCTION HANDLE: Brand: CARDINAL HEALTH

## (undated) DEVICE — INTENDED FOR TISSUE SEPARATION, AND OTHER PROCEDURES THAT REQUIRE A SHARP SURGICAL BLADE TO PUNCTURE OR CUT.: Brand: BARD-PARKER ® CARBON RIB-BACK BLADES

## (undated) DEVICE — DISPOSABLE BIOPSY VALVE MAJ-1555: Brand: SINGLE USE BIOPSY VALVE (STERILE)

## (undated) DEVICE — Device: Brand: OLYMPUS

## (undated) DEVICE — FORCEP ELECSURG RADIAL JAW4 2.2 X 240CM  HOT BX

## (undated) DEVICE — SNARE BARBED 230CM

## (undated) DEVICE — "MB-142 MOUTHPIECE": Brand: MOUTHPIECE

## (undated) DEVICE — GLOVE EXAM NON-STRL NTRL PLUS LRG PF

## (undated) DEVICE — LUBRICANT SURGILUBE TUBE 4 OZ  FLIP TOP

## (undated) DEVICE — RESOLUTION CLIP 2.8MM X 235CM STR LF DISP

## (undated) DEVICE — SYRINGE 10ML LL CONTROL TOP

## (undated) DEVICE — TRAVELKIT CONTAINS FIRST STEP KIT (200ML EP-4 KIT) AND SOILED SCOPE BAG - 1 KIT: Brand: TRAVELKIT CONTAINS FIRST STEP KIT AND SOILED SCOPE BAG

## (undated) DEVICE — BRUSH CYTOLOGY 3 MM 240 CM

## (undated) DEVICE — Device

## (undated) DEVICE — NEEDLE 25G X 1 1/2

## (undated) DEVICE — PACK GENERAL LF

## (undated) DEVICE — BASIC DOUBLE BASIN 2-LF: Brand: MEDLINE INDUSTRIES, INC.

## (undated) DEVICE — MARKER SPOT EX  BOWEL TATTOO SYRINGE

## (undated) DEVICE — LABEL STERILE (RSC) (2-SENSOR CAINE 2-LIDOCAINE 2-HEPARIN)

## (undated) DEVICE — GLOVE INDICATOR PI UNDERGLOVE SZ 8.5 BLUE

## (undated) DEVICE — DRAPE SHEET THREE QUARTER

## (undated) DEVICE — GLOVE SRG BIOGEL 8

## (undated) DEVICE — TUBING AUX CHANNEL

## (undated) DEVICE — GAUZE SPONGES,16 PLY: Brand: CURITY

## (undated) DEVICE — TRAP POLY

## (undated) DEVICE — "MH-443 SUCTION VALVE F/EVIS140 EVIS160": Brand: SUCTION VALVE

## (undated) DEVICE — SINGLE-USE BIOPSY FORCEPS: Brand: RADIAL JAW 4

## (undated) DEVICE — Device: Brand: 1.0MM/1.5MM SYSTEM

## (undated) DEVICE — "MH-438 A/W VLVE F/140 EVIS-140": Brand: AIR/WATER VALVE

## (undated) DEVICE — RETRIEVAL BALLOON CATHETER: Brand: EXTRACTOR™ PRO RX

## (undated) DEVICE — BAG SPECIMEN BIOHAZARD 10 X 10 ADHESIVE

## (undated) DEVICE — "MAJ-901 WATER CONTAINER SET CV-160/140": Brand: WATER CONTAINER